# Patient Record
Sex: FEMALE | Race: OTHER | HISPANIC OR LATINO | URBAN - METROPOLITAN AREA
[De-identification: names, ages, dates, MRNs, and addresses within clinical notes are randomized per-mention and may not be internally consistent; named-entity substitution may affect disease eponyms.]

---

## 2019-02-25 ENCOUNTER — EMERGENCY (EMERGENCY)
Facility: HOSPITAL | Age: 54
LOS: 1 days | Discharge: HOME | End: 2019-02-25
Attending: EMERGENCY MEDICINE | Admitting: EMERGENCY MEDICINE
Payer: COMMERCIAL

## 2019-02-25 VITALS
SYSTOLIC BLOOD PRESSURE: 150 MMHG | HEART RATE: 108 BPM | HEIGHT: 62 IN | TEMPERATURE: 99 F | WEIGHT: 160.06 LBS | OXYGEN SATURATION: 99 % | RESPIRATION RATE: 16 BRPM | DIASTOLIC BLOOD PRESSURE: 95 MMHG

## 2019-02-25 VITALS
SYSTOLIC BLOOD PRESSURE: 156 MMHG | RESPIRATION RATE: 16 BRPM | OXYGEN SATURATION: 98 % | TEMPERATURE: 99 F | HEART RATE: 79 BPM | DIASTOLIC BLOOD PRESSURE: 93 MMHG

## 2019-02-25 PROCEDURE — 99283 EMERGENCY DEPT VISIT LOW MDM: CPT | Mod: 25

## 2019-02-25 PROCEDURE — 99283 EMERGENCY DEPT VISIT LOW MDM: CPT

## 2019-02-25 PROCEDURE — 96372 THER/PROPH/DIAG INJ SC/IM: CPT

## 2019-02-25 RX ORDER — DEXAMETHASONE 0.5 MG/5ML
6 ELIXIR ORAL ONCE
Refills: 0 | Status: COMPLETED | OUTPATIENT
Start: 2019-02-25 | End: 2019-02-25

## 2019-02-25 RX ORDER — OXYCODONE AND ACETAMINOPHEN 5; 325 MG/1; MG/1
1 TABLET ORAL ONCE
Refills: 0 | Status: DISCONTINUED | OUTPATIENT
Start: 2019-02-25 | End: 2019-02-25

## 2019-02-25 RX ADMIN — OXYCODONE AND ACETAMINOPHEN 1 TABLET(S): 5; 325 TABLET ORAL at 16:13

## 2019-02-25 RX ADMIN — Medication 6 MILLIGRAM(S): at 16:13

## 2019-02-25 NOTE — ED ADULT NURSE NOTE - NSIMPLEMENTINTERV_GEN_ALL_ED
Implemented All Universal Safety Interventions:  [place] to call system. Call bell, personal items and telephone within reach. Instruct patient to call for assistance. Room bathroom lighting operational. Non-slip footwear when patient is off stretcher. Physically safe environment: no spills, clutter or unnecessary equipment. Stretcher in lowest position, wheels locked, appropriate side rails in place.

## 2019-02-25 NOTE — ED ADULT NURSE NOTE - OBJECTIVE STATEMENT
47 yr. old female c/o right arm pain.  Pt. seen in Urgent care earlier today given medication without relief.  Pain described 9/10.  Mobile, positive sensation in right arm.

## 2019-07-02 PROBLEM — Z00.00 ENCOUNTER FOR PREVENTIVE HEALTH EXAMINATION: Status: ACTIVE | Noted: 2019-07-02

## 2019-07-16 ENCOUNTER — RECORD ABSTRACTING (OUTPATIENT)
Age: 54
End: 2019-07-16

## 2019-07-16 ENCOUNTER — APPOINTMENT (OUTPATIENT)
Dept: ENDOCRINOLOGY | Facility: CLINIC | Age: 54
End: 2019-07-16
Payer: COMMERCIAL

## 2019-07-16 ENCOUNTER — APPOINTMENT (OUTPATIENT)
Dept: ENDOCRINOLOGY | Facility: CLINIC | Age: 54
End: 2019-07-16

## 2019-07-16 VITALS
OXYGEN SATURATION: 98 % | HEIGHT: 62 IN | HEART RATE: 74 BPM | SYSTOLIC BLOOD PRESSURE: 130 MMHG | RESPIRATION RATE: 18 BRPM | BODY MASS INDEX: 33.13 KG/M2 | WEIGHT: 180 LBS | DIASTOLIC BLOOD PRESSURE: 76 MMHG

## 2019-07-16 DIAGNOSIS — Z86.79 PERSONAL HISTORY OF OTHER DISEASES OF THE CIRCULATORY SYSTEM: ICD-10-CM

## 2019-07-16 DIAGNOSIS — Z79.4 OTHER SPECIFIED DIABETES MELLITUS WITH HYPEROSMOLARITY WITH COMA: ICD-10-CM

## 2019-07-16 DIAGNOSIS — Z79.4 TYPE 2 DIABETES MELLITUS W/OUT COMPLICATIONS: ICD-10-CM

## 2019-07-16 DIAGNOSIS — E13.01 OTHER SPECIFIED DIABETES MELLITUS WITH HYPEROSMOLARITY WITH COMA: ICD-10-CM

## 2019-07-16 DIAGNOSIS — Z83.3 FAMILY HISTORY OF DIABETES MELLITUS: ICD-10-CM

## 2019-07-16 DIAGNOSIS — E11.9 TYPE 2 DIABETES MELLITUS W/OUT COMPLICATIONS: ICD-10-CM

## 2019-07-16 PROBLEM — Z00.00 ENCOUNTER FOR PREVENTIVE HEALTH EXAMINATION: Noted: 2019-07-16

## 2019-07-16 PROCEDURE — 99214 OFFICE O/P EST MOD 30 MIN: CPT

## 2019-07-16 NOTE — PHYSICAL EXAM
[Alert] : alert [No Acute Distress] : no acute distress [Well Nourished] : well nourished [Well Developed] : well developed [Normal Sclera/Conjunctiva] : normal sclera/conjunctiva [EOMI] : extra ocular movement intact [No Proptosis] : no proptosis [Normal Oropharynx] : the oropharynx was normal [Thyroid Not Enlarged] : the thyroid was not enlarged [No Thyroid Nodules] : there were no palpable thyroid nodules [No Respiratory Distress] : no respiratory distress [No Accessory Muscle Use] : no accessory muscle use [Clear to Auscultation] : lungs were clear to auscultation bilaterally [Normal Rate] : heart rate was normal  [Normal S1, S2] : normal S1 and S2 [Regular Rhythm] : with a regular rhythm [Pedal Pulses Normal] : the pedal pulses are present [No Edema] : there was no peripheral edema [Normal Bowel Sounds] : normal bowel sounds [Not Tender] : non-tender [Soft] : abdomen soft [Not Distended] : not distended [Post Cervical Nodes] : posterior cervical nodes [Anterior Cervical Nodes] : anterior cervical nodes [Axillary Nodes] : axillary nodes [Normal] : normal and non tender [No Spinal Tenderness] : no spinal tenderness [Spine Straight] : spine straight [Normal Gait] : normal gait [Normal Strength/Tone] : muscle strength and tone were normal [No Rash] : no rash [Normal Reflexes] : deep tendon reflexes were 2+ and symmetric [No Tremors] : no tremors [Oriented x3] : oriented to person, place, and time [Acanthosis Nigricans] : no acanthosis nigricans [de-identified] : dm2

## 2019-07-16 NOTE — ASSESSMENT
[FreeTextEntry1] : Patient will have lab test done evaluate her glucose level. will return next month

## 2019-07-22 ENCOUNTER — RX RENEWAL (OUTPATIENT)
Age: 54
End: 2019-07-22

## 2019-08-13 ENCOUNTER — APPOINTMENT (OUTPATIENT)
Dept: ENDOCRINOLOGY | Facility: CLINIC | Age: 54
End: 2019-08-13

## 2020-07-20 ENCOUNTER — APPOINTMENT (OUTPATIENT)
Dept: ENDOCRINOLOGY | Facility: CLINIC | Age: 55
End: 2020-07-20
Payer: COMMERCIAL

## 2020-07-20 VITALS — BODY MASS INDEX: 30.73 KG/M2 | WEIGHT: 167 LBS | HEIGHT: 62 IN

## 2020-07-20 PROCEDURE — 99448 NTRPROF PH1/NTRNET/EHR 21-30: CPT

## 2020-07-20 RX ORDER — AMLODIPINE BESYLATE 10 MG/1
10 TABLET ORAL
Refills: 0 | Status: COMPLETED | COMMUNITY
End: 2020-07-20

## 2020-07-20 RX ORDER — HALOBETASOL PROPIONATE 0.5 MG/G
0.05 CREAM TOPICAL
Qty: 15 | Refills: 0 | Status: ACTIVE | COMMUNITY
Start: 2020-07-17

## 2020-07-20 RX ORDER — LOSARTAN POTASSIUM 100 MG/1
TABLET, FILM COATED ORAL
Refills: 0 | Status: COMPLETED | COMMUNITY
End: 2020-07-20

## 2020-07-20 NOTE — ASSESSMENT
[Diabetes Foot Care] : diabetes foot care [Carbohydrate Consistent Diet] : carbohydrate consistent diet [Importance of Diet and Exercise] : importance of diet and exercise to improve glycemic control, achieve weight loss and improve cardiovascular health [Long Term Vascular Complications] : long term vascular complications of diabetes [Exercise/Effect on Glucose] : exercise/effect on glucose [Hypoglycemia Management] : hypoglycemia management [Self Monitoring of Blood Glucose] : self monitoring of blood glucose [Action and use of Insulin] : action and use of short and long-acting insulin [Insulin Self-Administration] : insulin self-administration [Sick-Day Management] : sick-day management [Injection Technique, Storage, Sharps Disposal] : injection technique, storage, and sharps disposal [Retinopathy Screening] : Patient was referred to ophthalmology for retinopathy screening [Diabetic Medications] : Risks and benefits of diabetic medications were discussed [Weight Loss] : weight loss [FreeTextEntry1] : continue diet and exercise and do labs

## 2020-07-20 NOTE — HISTORY OF PRESENT ILLNESS
[Home] : at home, [unfilled] , at the time of the visit. [Medical Office: ([facility])___] : at the medical office located in  [Verbal consent obtained from patient] : the patient, [unfilled] [Finger Stick] : Finger Stick: Yes [FreeTextEntry4] : Joleen John [Hypoglycemia] : Patient is not hypoglycemic. [Continuous Glucose Monitoring] : Continuous Glucose Monitoring: No [FreeTextEntry1] : testing 1-3 x a day

## 2020-07-20 NOTE — THERAPY
[Today's Date] : [unfilled] [BG Target = ____] : BG Target = [unfilled] [Basaglar] : Basaglar [FreeTextEntry9] : 20 units daily [FreeTextEntry7] : patient has not taken her trulicity or xigduo. will do labs and evaluate

## 2020-07-21 RX ORDER — INSULIN GLARGINE 100 [IU]/ML
100 INJECTION, SOLUTION SUBCUTANEOUS DAILY
Qty: 5 | Refills: 5 | Status: COMPLETED | COMMUNITY
Start: 2020-07-14 | End: 2020-07-21

## 2020-08-17 ENCOUNTER — APPOINTMENT (OUTPATIENT)
Dept: ENDOCRINOLOGY | Facility: CLINIC | Age: 55
End: 2020-08-17
Payer: COMMERCIAL

## 2020-08-17 PROCEDURE — 99448 NTRPROF PH1/NTRNET/EHR 21-30: CPT

## 2020-08-17 RX ORDER — DAPAGLIFLOZIN AND METFORMIN HYDROCHLORIDE 5; 1000 MG/1; MG/1
5-1000 TABLET, FILM COATED, EXTENDED RELEASE ORAL
Refills: 0 | Status: COMPLETED | COMMUNITY
End: 2020-08-17

## 2020-08-17 NOTE — ASSESSMENT
[Long Term Vascular Complications] : long term vascular complications of diabetes [FreeTextEntry1] : discussed to stop eating ices and watermelon. increase the insulin and metformin 1000mg bid, trulicity 0.75 mg sc weekly, omega e 1000mg bid, an atorvastatin 40 mg q hs. need to evaluate 2 weeks, bring her glucose monitor log [Carbohydrate Consistent Diet] : carbohydrate consistent diet [Importance of Diet and Exercise] : importance of diet and exercise to improve glycemic control, achieve weight loss and improve cardiovascular health [Exercise/Effect on Glucose] : exercise/effect on glucose [Self Monitoring of Blood Glucose] : self monitoring of blood glucose [Insulin Self-Administration] : insulin self-administration [Action and use of Insulin] : action and use of short and long-acting insulin [Injection Technique, Storage, Sharps Disposal] : injection technique, storage, and sharps disposal [Weight Loss] : weight loss [Diabetic Medications] : Risks and benefits of diabetic medications were discussed

## 2020-08-17 NOTE — THERAPY
[Today's Date] : [unfilled] [Lantus] : Lantus [FreeTextEntry9] : increase to 30 units, taking 25 units glucose this morning 302

## 2020-08-17 NOTE — HISTORY OF PRESENT ILLNESS
[Home] : at home, [unfilled] , at the time of the visit. [Verbal consent obtained from patient] : the patient, [unfilled] [Medical Office: ([facility])___] : at the medical office located in  [Continuous Glucose Monitoring] : Continuous Glucose Monitoring: No [FreeTextEntry4] : Chanda Avina [Finger Stick] : Finger Stick: Yes [FreeTextEntry9] : 302 [Hypoglycemia] : Patient is not hypoglycemic. [FreeTextEntry1] : 1-3 x a day

## 2020-08-20 ENCOUNTER — APPOINTMENT (OUTPATIENT)
Dept: ENDOCRINOLOGY | Facility: CLINIC | Age: 55
End: 2020-08-20

## 2020-09-29 ENCOUNTER — APPOINTMENT (OUTPATIENT)
Dept: ENDOCRINOLOGY | Facility: CLINIC | Age: 55
End: 2020-09-29

## 2021-01-14 ENCOUNTER — APPOINTMENT (OUTPATIENT)
Dept: ENDOCRINOLOGY | Facility: CLINIC | Age: 56
End: 2021-01-14
Payer: COMMERCIAL

## 2021-01-14 VITALS — WEIGHT: 150 LBS | BODY MASS INDEX: 27.6 KG/M2 | HEIGHT: 62 IN

## 2021-01-14 DIAGNOSIS — Z78.9 OTHER SPECIFIED HEALTH STATUS: ICD-10-CM

## 2021-01-14 PROCEDURE — 99214 OFFICE O/P EST MOD 30 MIN: CPT | Mod: 95

## 2021-01-15 NOTE — REVIEW OF SYSTEMS
[Recent Weight Loss (___ Lbs)] : recent weight loss: [unfilled] lbs [Nausea] : nausea [Gas/Bloating] : gas/bloating [Negative] : Heme/Lymph [FreeTextEntry2] : 3 [FreeTextEntry7] : occasionally before dinner [de-identified] : DM2

## 2021-01-15 NOTE — HISTORY OF PRESENT ILLNESS
[Home] : at home, [unfilled] , at the time of the visit. [Medical Office: ([facility])___] : at the medical office located in  [Verbal consent obtained from patient] : the patient, [unfilled] [Finger Stick] : Finger Stick: Yes [FreeTextEntry4] : Joleen John [Continuous Glucose Monitoring] : Continuous Glucose Monitoring: No [Hypoglycemia] : Patient is not hypoglycemic. [FreeTextEntry9] : 105-109 [FreeTextEntry1] : testing 1-3 a day

## 2021-01-15 NOTE — THERAPY
[Today's Date] : [unfilled] [Lantus] : Lantus [BG Target = ____] : BG Target = [unfilled] [FreeTextEntry9] : 33 units at night [FreeTextEntry7] : trulicity 0.75 mg weekly, metformin 1000 mg bid

## 2021-01-15 NOTE — DATA REVIEWED
[No studies available for review at this time.] : No studies available for review at this time. [FreeTextEntry1] : 1

## 2021-01-15 NOTE — ASSESSMENT
[Diabetes Foot Care] : diabetes foot care [Long Term Vascular Complications] : long term vascular complications of diabetes [Carbohydrate Consistent Diet] : carbohydrate consistent diet [Importance of Diet and Exercise] : importance of diet and exercise to improve glycemic control, achieve weight loss and improve cardiovascular health [Exercise/Effect on Glucose] : exercise/effect on glucose [Hypoglycemia Management] : hypoglycemia management [Glucagon Use] : glucagon use [Ketone Testing] : ketone testing [Action and use of Insulin] : action and use of short and long-acting insulin [Self Monitoring of Blood Glucose] : self monitoring of blood glucose [Insulin Self-Administration] : insulin self-administration [Injection Technique, Storage, Sharps Disposal] : injection technique, storage, and sharps disposal [Sick-Day Management] : sick-day management [Retinopathy Screening] : Patient was referred to ophthalmology for retinopathy screening [Weight Loss] : weight loss [Diabetic Medications] : Risks and benefits of diabetic medications were discussed

## 2021-02-10 ENCOUNTER — INPATIENT (INPATIENT)
Facility: HOSPITAL | Age: 56
LOS: 11 days | Discharge: HOME HEALTH | End: 2021-02-22
Attending: SPECIALIST | Admitting: SPECIALIST
Payer: COMMERCIAL

## 2021-02-10 ENCOUNTER — RESULT REVIEW (OUTPATIENT)
Age: 56
End: 2021-02-10

## 2021-02-10 VITALS
DIASTOLIC BLOOD PRESSURE: 93 MMHG | TEMPERATURE: 98 F | HEIGHT: 62 IN | SYSTOLIC BLOOD PRESSURE: 128 MMHG | WEIGHT: 149.03 LBS | RESPIRATION RATE: 18 BRPM | HEART RATE: 104 BPM | OXYGEN SATURATION: 97 %

## 2021-02-10 DIAGNOSIS — Z98.51 TUBAL LIGATION STATUS: Chronic | ICD-10-CM

## 2021-02-10 LAB
ALBUMIN SERPL ELPH-MCNC: 3.8 G/DL — SIGNIFICANT CHANGE UP (ref 3.5–5.2)
ALP SERPL-CCNC: 66 U/L — SIGNIFICANT CHANGE UP (ref 30–115)
ALT FLD-CCNC: 20 U/L — SIGNIFICANT CHANGE UP (ref 0–41)
ANION GAP SERPL CALC-SCNC: 29 MMOL/L — HIGH (ref 7–14)
APTT BLD: 27.1 SEC — SIGNIFICANT CHANGE UP (ref 27–39.2)
AST SERPL-CCNC: 21 U/L — SIGNIFICANT CHANGE UP (ref 0–41)
B-OH-BUTYR SERPL-SCNC: 0.7 MMOL/L — HIGH
BASE EXCESS BLDV CALC-SCNC: SIGNIFICANT CHANGE UP MMOL/L (ref -2–2)
BASOPHILS # BLD AUTO: 0 K/UL — SIGNIFICANT CHANGE UP (ref 0–0.2)
BASOPHILS NFR BLD AUTO: 0 % — SIGNIFICANT CHANGE UP (ref 0–1)
BILIRUB SERPL-MCNC: 1.1 MG/DL — SIGNIFICANT CHANGE UP (ref 0.2–1.2)
BLD GP AB SCN SERPL QL: SIGNIFICANT CHANGE UP
BUN SERPL-MCNC: 85 MG/DL — CRITICAL HIGH (ref 10–20)
BURR CELLS BLD QL SMEAR: PRESENT — SIGNIFICANT CHANGE UP
CA-I SERPL-SCNC: 1.02 MMOL/L — LOW (ref 1.12–1.3)
CALCIUM SERPL-MCNC: 8.8 MG/DL — SIGNIFICANT CHANGE UP (ref 8.5–10.1)
CHLORIDE SERPL-SCNC: 92 MMOL/L — LOW (ref 98–110)
CO2 SERPL-SCNC: 16 MMOL/L — LOW (ref 17–32)
CREAT SERPL-MCNC: 3.6 MG/DL — HIGH (ref 0.7–1.5)
EOSINOPHIL # BLD AUTO: 0 K/UL — SIGNIFICANT CHANGE UP (ref 0–0.7)
EOSINOPHIL NFR BLD AUTO: 0 % — SIGNIFICANT CHANGE UP (ref 0–8)
GAS PNL BLDV: 134 MMOL/L — LOW (ref 136–145)
GAS PNL BLDV: SIGNIFICANT CHANGE UP
GAS PNL BLDV: SIGNIFICANT CHANGE UP
GIANT PLATELETS BLD QL SMEAR: PRESENT — SIGNIFICANT CHANGE UP
GLUCOSE SERPL-MCNC: 193 MG/DL — HIGH (ref 70–99)
HCG SERPL QL: NEGATIVE — SIGNIFICANT CHANGE UP
HCO3 BLDV-SCNC: 22 MMOL/L — SIGNIFICANT CHANGE UP (ref 22–29)
HCT VFR BLD CALC: 45.1 % — SIGNIFICANT CHANGE UP (ref 37–47)
HCT VFR BLDA CALC: SIGNIFICANT CHANGE UP % (ref 34–44)
HGB BLD CALC-MCNC: SIGNIFICANT CHANGE UP G/DL (ref 14–18)
HGB BLD-MCNC: 16.3 G/DL — HIGH (ref 12–16)
INR BLD: 1.16 RATIO — SIGNIFICANT CHANGE UP (ref 0.65–1.3)
LACTATE BLDV-MCNC: 4.3 MMOL/L — HIGH (ref 0.5–1.6)
LACTATE SERPL-SCNC: 4.7 MMOL/L — CRITICAL HIGH (ref 0.7–2)
LIDOCAIN IGE QN: 9 U/L — SIGNIFICANT CHANGE UP (ref 7–60)
LYMPHOCYTES # BLD AUTO: 10.5 % — LOW (ref 20.5–51.1)
LYMPHOCYTES # BLD AUTO: 2.08 K/UL — SIGNIFICANT CHANGE UP (ref 1.2–3.4)
MANUAL SMEAR VERIFICATION: SIGNIFICANT CHANGE UP
MCHC RBC-ENTMCNC: 30.3 PG — SIGNIFICANT CHANGE UP (ref 27–31)
MCHC RBC-ENTMCNC: 36.1 G/DL — SIGNIFICANT CHANGE UP (ref 32–37)
MCV RBC AUTO: 83.8 FL — SIGNIFICANT CHANGE UP (ref 81–99)
MONOCYTES # BLD AUTO: 1.9 K/UL — HIGH (ref 0.1–0.6)
MONOCYTES NFR BLD AUTO: 9.6 % — HIGH (ref 1.7–9.3)
NEUTROPHILS # BLD AUTO: 15.45 K/UL — HIGH (ref 1.4–6.5)
NEUTROPHILS NFR BLD AUTO: 70.2 % — SIGNIFICANT CHANGE UP (ref 42.2–75.2)
NEUTS BAND # BLD: 7.9 % — HIGH (ref 0–6)
PCO2 BLDV: 42 MMHG — SIGNIFICANT CHANGE UP (ref 41–51)
PH BLDV: 7.31 — SIGNIFICANT CHANGE UP (ref 7.26–7.43)
PLAT MORPH BLD: NORMAL — SIGNIFICANT CHANGE UP
PLATELET # BLD AUTO: 308 K/UL — SIGNIFICANT CHANGE UP (ref 130–400)
PO2 BLDV: 38 MMHG — SIGNIFICANT CHANGE UP (ref 20–40)
POIKILOCYTOSIS BLD QL AUTO: SIGNIFICANT CHANGE UP
POTASSIUM BLDV-SCNC: 3.8 MMOL/L — SIGNIFICANT CHANGE UP (ref 3.3–5.6)
POTASSIUM SERPL-MCNC: 4 MMOL/L — SIGNIFICANT CHANGE UP (ref 3.5–5)
POTASSIUM SERPL-SCNC: 4 MMOL/L — SIGNIFICANT CHANGE UP (ref 3.5–5)
PROT SERPL-MCNC: 6.5 G/DL — SIGNIFICANT CHANGE UP (ref 6–8)
PROTHROM AB SERPL-ACNC: 13.3 SEC — HIGH (ref 9.95–12.87)
RBC # BLD: 5.38 M/UL — SIGNIFICANT CHANGE UP (ref 4.2–5.4)
RBC # FLD: 13.4 % — SIGNIFICANT CHANGE UP (ref 11.5–14.5)
RBC BLD AUTO: ABNORMAL
SAO2 % BLDV: SIGNIFICANT CHANGE UP %
SARS-COV-2 RNA SPEC QL NAA+PROBE: SIGNIFICANT CHANGE UP
SODIUM SERPL-SCNC: 137 MMOL/L — SIGNIFICANT CHANGE UP (ref 135–146)
VARIANT LYMPHS # BLD: 1.8 % — SIGNIFICANT CHANGE UP (ref 0–5)
WBC # BLD: 19.78 K/UL — HIGH (ref 4.8–10.8)
WBC # FLD AUTO: 19.78 K/UL — HIGH (ref 4.8–10.8)

## 2021-02-10 PROCEDURE — 93010 ELECTROCARDIOGRAM REPORT: CPT

## 2021-02-10 PROCEDURE — 99285 EMERGENCY DEPT VISIT HI MDM: CPT

## 2021-02-10 PROCEDURE — 71045 X-RAY EXAM CHEST 1 VIEW: CPT | Mod: 26

## 2021-02-10 PROCEDURE — 74177 CT ABD & PELVIS W/CONTRAST: CPT | Mod: 26

## 2021-02-10 RX ORDER — PANTOPRAZOLE SODIUM 20 MG/1
40 TABLET, DELAYED RELEASE ORAL DAILY
Refills: 0 | Status: DISCONTINUED | OUTPATIENT
Start: 2021-02-10 | End: 2021-02-11

## 2021-02-10 RX ORDER — PIPERACILLIN AND TAZOBACTAM 4; .5 G/20ML; G/20ML
3.38 INJECTION, POWDER, LYOPHILIZED, FOR SOLUTION INTRAVENOUS ONCE
Refills: 0 | Status: COMPLETED | OUTPATIENT
Start: 2021-02-10 | End: 2021-02-10

## 2021-02-10 RX ORDER — SODIUM CHLORIDE 9 MG/ML
1000 INJECTION INTRAMUSCULAR; INTRAVENOUS; SUBCUTANEOUS ONCE
Refills: 0 | Status: COMPLETED | OUTPATIENT
Start: 2021-02-10 | End: 2021-02-10

## 2021-02-10 RX ORDER — HYDROMORPHONE HYDROCHLORIDE 2 MG/ML
1 INJECTION INTRAMUSCULAR; INTRAVENOUS; SUBCUTANEOUS ONCE
Refills: 0 | Status: DISCONTINUED | OUTPATIENT
Start: 2021-02-10 | End: 2021-02-10

## 2021-02-10 RX ORDER — CEFOTETAN DISODIUM 1 G
2 VIAL (EA) INJECTION ONCE
Refills: 0 | Status: DISCONTINUED | OUTPATIENT
Start: 2021-02-10 | End: 2021-02-10

## 2021-02-10 RX ORDER — SODIUM CHLORIDE 9 MG/ML
1000 INJECTION, SOLUTION INTRAVENOUS ONCE
Refills: 0 | Status: COMPLETED | OUTPATIENT
Start: 2021-02-10 | End: 2021-02-10

## 2021-02-10 RX ORDER — HEPARIN SODIUM 5000 [USP'U]/ML
5000 INJECTION INTRAVENOUS; SUBCUTANEOUS EVERY 8 HOURS
Refills: 0 | Status: DISCONTINUED | OUTPATIENT
Start: 2021-02-10 | End: 2021-02-11

## 2021-02-10 RX ORDER — IOHEXOL 300 MG/ML
30 INJECTION, SOLUTION INTRAVENOUS ONCE
Refills: 0 | Status: COMPLETED | OUTPATIENT
Start: 2021-02-10 | End: 2021-02-10

## 2021-02-10 RX ORDER — CEFOTETAN DISODIUM 1 G
2 VIAL (EA) INJECTION EVERY 12 HOURS
Refills: 0 | Status: DISCONTINUED | OUTPATIENT
Start: 2021-02-10 | End: 2021-02-10

## 2021-02-10 RX ORDER — MORPHINE SULFATE 50 MG/1
4 CAPSULE, EXTENDED RELEASE ORAL ONCE
Refills: 0 | Status: DISCONTINUED | OUTPATIENT
Start: 2021-02-10 | End: 2021-02-10

## 2021-02-10 RX ORDER — PIPERACILLIN AND TAZOBACTAM 4; .5 G/20ML; G/20ML
3.38 INJECTION, POWDER, LYOPHILIZED, FOR SOLUTION INTRAVENOUS ONCE
Refills: 0 | Status: DISCONTINUED | OUTPATIENT
Start: 2021-02-10 | End: 2021-02-11

## 2021-02-10 RX ADMIN — SODIUM CHLORIDE 1000 MILLILITER(S): 9 INJECTION INTRAMUSCULAR; INTRAVENOUS; SUBCUTANEOUS at 19:22

## 2021-02-10 RX ADMIN — HYDROMORPHONE HYDROCHLORIDE 1 MILLIGRAM(S): 2 INJECTION INTRAMUSCULAR; INTRAVENOUS; SUBCUTANEOUS at 22:25

## 2021-02-10 RX ADMIN — SODIUM CHLORIDE 1000 MILLILITER(S): 9 INJECTION, SOLUTION INTRAVENOUS at 21:29

## 2021-02-10 RX ADMIN — IOHEXOL 30 MILLILITER(S): 300 INJECTION, SOLUTION INTRAVENOUS at 18:33

## 2021-02-10 RX ADMIN — SODIUM CHLORIDE 1000 MILLILITER(S): 9 INJECTION INTRAMUSCULAR; INTRAVENOUS; SUBCUTANEOUS at 17:35

## 2021-02-10 RX ADMIN — MORPHINE SULFATE 4 MILLIGRAM(S): 50 CAPSULE, EXTENDED RELEASE ORAL at 17:35

## 2021-02-10 RX ADMIN — PIPERACILLIN AND TAZOBACTAM 200 GRAM(S): 4; .5 INJECTION, POWDER, LYOPHILIZED, FOR SOLUTION INTRAVENOUS at 18:33

## 2021-02-10 NOTE — H&P ADULT - NSHPLABSRESULTS_GEN_ALL_CORE
LAB/STUDIES:                        16.3   19.78 )-----------( 308      ( 10 Feb 2021 17:56 )             45.1     02-10    137  |  92<L>  |  85<HH>  ----------------------------<  193<H>  4.0   |  16<L>  |  3.6<H>    Ca    8.8      10 Feb 2021 17:56    TPro  6.5  /  Alb  3.8  /  TBili  1.1  /  DBili  x   /  AST  21  /  ALT  20  /  AlkPhos  66  02-10      LIVER FUNCTIONS - ( 10 Feb 2021 17:56 )  Alb: 3.8 g/dL / Pro: 6.5 g/dL / ALK PHOS: 66 U/L / ALT: 20 U/L / AST: 21 U/L / GGT: x             < from: CT Abdomen and Pelvis w/ Oral Cont and w/ IV Cont (02.10.21 @ 21:05) >      IMPRESSION:    There is a large ventral hernia containing distal small bowel loops, the appendix, and the right colon from the cecum to the level of the transverse colon. There is marked distention of proximal small bowel consistent with small bowel obstruction at the level of the hernia. There is reduced bowel wall enhancement of the distal small bowel proximal to the hernia which may indicate the presence of ischemia.    < end of copied text >

## 2021-02-10 NOTE — ED ADULT TRIAGE NOTE - CHIEF COMPLAINT QUOTE
pt BIBA from home with complaint of abdominal pain , pt has history of hernia and was shoveling the other day when symptoms started

## 2021-02-10 NOTE — ED ADULT TRIAGE NOTE - CADM TRG TX PRIOR TO ARRIVAL
The patient was seen in the urgent care on 1/19/20 for a right middle finger non displaced fracture and to follow up in 1 week.     The finger continues to be swollen and discolored and with a 8/10 pain    She was also seen for cold symptoms and diagnosed w none

## 2021-02-10 NOTE — ED ADULT NURSE NOTE - OBJECTIVE STATEMENT
54 y/o female brought in for abdominal pain. patient states she has an abdominal hernia for years and now complaint of severe pain. patient admits to constipation x 3 days and vomiting.

## 2021-02-10 NOTE — H&P ADULT - HISTORY OF PRESENT ILLNESS
55y female with a past medical hx of HTN, Hyperlipidemia, DM, presents to the ED complaining of increased pain from a chronic umbilical hernia. She states that she first noticed this hernia about 20 years ago, immediately after a tubal ligation procedure, and it has been slowly enlarging since. She states that it always has been uncomfortable, but never this painful. The severe pain is rated 10/10 and is accompanied with nausea and vomiting, of 3 days duration. She states she is not passing gas and that her last bowel movement was 1 week ago.

## 2021-02-10 NOTE — H&P ADULT - ASSESSMENT
55y female with a past medical hx of HTN, Hyperlipidemia, DM, presents to the ED complaining of increased pain from a chronic umbilical hernia. She states that she first noticed this hernia about 20 years ago, immediately after a tubal ligation procedure, and it has been slowly enlarging since. She states that it always has been uncomfortable, but never this painful. The severe pain is rated 10/10 and is accompanied with nausea and vomiting, of 3 days duration. She states she is not passing gas and that her last bowel movement was 1 week ago.     CT Abdomen was done showing ventral hernia containing distal small bowel loops, the appendix, and the right colon from the cecum to the level of the transverse colon. There is marked distention of proximal small bowel consistent with small bowel obstruction at the level of the hernia. There is reduced bowel wall enhancement of the distal small bowel proximal to the hernia which may indicate the presence of ischemia.    Decision made to take patient emergently to the OR tonight with Dr. Bolden.  NGT was placed. Consent done.    Plan:  OR Level 1 Exploratory Laparotomy, Possible Bowel Resection, Possible Ostomy, and All indicated Procedures  Prepare patient for the operating room.  Keep strict NPO  Make sure all labs and imaging is done.  55y female with a past medical hx of HTN, Hyperlipidemia, DM, presents to the ED complaining of increased pain from a chronic umbilical hernia. She states that she first noticed this hernia about 20 years ago, immediately after a tubal ligation procedure, and it has been slowly enlarging since. She states that it always has been uncomfortable, but never this painful. The severe pain is rated 10/10 and is accompanied with nausea and vomiting, of 3 days duration. She states she is not passing gas and that her last bowel movement was 1 week ago.     CT Abdomen was done showing ventral hernia containing distal small bowel loops, the appendix, and the right colon from the cecum to the level of the transverse colon. There is marked distention of proximal small bowel consistent with small bowel obstruction at the level of the hernia. There is reduced bowel wall enhancement of the distal small bowel proximal to the hernia which may indicate the presence of ischemia.    Decision made to take patient emergently to the OR tonight with Dr. Bolden.  NGT was placed. Consent done.    Plan:  OR Level 1 Exploratory Laparotomy, Possible Bowel Resection, Possible Ostomy, and All indicated Procedures  Prepare patient for the operating room.  Keep strict NPO  Make sure all labs and imaging is done.    Senior Resident Addendum  Narrative as above. Benign physical exam, without rebound, guarding, non-peritonitic however patient reporting back pain. Vitals significant for isolated tachycardia. Labs with significant derangements, including but not limited to leukocytosis to 19.78, hemoconcentrated hgb 16.3, DERRICK to 3.6, lactate 4 refractory to fluid resuscitation in ED. Central line, a-line placed prior to beginning of case. Booked as emergent d/t concerning signs of bowel ischemia. Extensive discussion had with patient in regards to possible need for bowel resection, prolonged hospital stay, possible prolonged ventilation requirement, returns to OR entirely contingent upon OR findings. Patient at high risk for worsening sepsis upon surgical intervention, patient aware of risks and agreeable for OR.   Case d/w Dr. Bolden, ED, patient, surgical team.

## 2021-02-10 NOTE — H&P ADULT - NSICDXPASTMEDICALHX_GEN_ALL_CORE_FT
PAST MEDICAL HISTORY:  No pertinent past medical history      PAST MEDICAL HISTORY:  Diabetes     Hypertension     Marijuana use, continuous     No pertinent past medical history

## 2021-02-10 NOTE — ED PROVIDER NOTE - PROGRESS NOTE DETAILS
surgery aware of pt, was at bedside shortly after initial eval. aware of labs, d/w pt. awaiting CT rad called with CT read, surg and pt notified. preop testing ordered. Attending Note: 56 y/o F PMHx DM, HTN, hernia. Pt states she was shoveling snow x3 days ago and reports pain in her hernia site. Pain is 11/10, sharp. States she had x1 episode of vomiting. No fever, chills, cough, SOB, or urinary complaints. Exam: CONSTITUTIONAL: WA / WN / NAD. HEAD: NCAT. EYES: PERRL; EOMI; anicteric. ENT: Normal pharynx; mucous membranes pink/moist, no erythema. NECK: Supple; no meningeal signs CARD: RRR; nl S1/S2; no M/R/G. Pulses equal bilaterally. RESP: Respiratory rate and effort are normal; breath sounds clear and equal bilaterally. ABD: large hernia felt below umbilicus with ttp, soft, ND nl bowel sounds; no rebound. MSK/EXT: No gross deformities; full range of motion. SKIN: Warm and dry;  NEURO: AAOx3, PSYCH: Memory Intact, Normal Affect. Attending Note: 56 y/o F PMHx DM, HTN, hernia. Pt states she was shoveling snow x3 days ago and reports pain in her hernia site. Pain is 11/10, sharp. States she had x1 episode of vomiting. No fever, chills, cough, SOB, or urinary complaints. Exam: CONSTITUTIONAL: WA / WN / NAD. HEAD: NCAT. EYES: PERRL; EOMI; ENT: Normal pharynx; mucous membranes pink/moist, no erythema. NECK: Supple; no meningeal signs CARD: RRR; nl S1/S2; no M/R/G. Pulses equal bilaterally. RESP: Respiratory rate and effort are normal; breath sounds clear and equal bilaterally. ABD: large hernia felt below umbilicus with ttp, MSK/EXT: No gross deformities; full range of motion. SKIN: Warm and dry;  NEURO: AAOx3, PSYCH: Memory Intact, Normal Affect. SR: spoke with Dr. Gonzalez patient to be admitted to Dr. Radford to go to the OR

## 2021-02-10 NOTE — ED PROVIDER NOTE - CLINICAL SUMMARY MEDICAL DECISION MAKING FREE TEXT BOX
56 y/o F PMHx DM, HTN, hernia. Pt states she was shoveling snow x3 days ago and reports pain in her hernia site. Pain is 11/10, sharp. VS reviewed. Labs imaging obtained and reviewed. Antibiotics ordered. Patient found to have There is a large ventral hernia containing distal small bowel loops, the appendix, and the right colon from the cecum to the level of the transverse colon. There is marked distention of proximal small bowel consistent with small bowel obstruction at the level of the hernia. There is reduced bowel wall enhancement of the distal small bowel proximal to the hernia which may indicate the presence of ischemia. Pain medicine Given. Tavarez was placed due to DERRICK. Surgery consulted and patient to be admitted under dr. manzano to go to the OR.

## 2021-02-10 NOTE — ED PROVIDER NOTE - PHYSICAL EXAMINATION
CONSTITUTIONAL: Well-appearing; well-nourished; in no apparent distress.   CARDIOVASCULAR: Normal S1, S2; no murmurs, rubs, or gallops.   RESPIRATORY: Normal chest excursion with respiration; breath sounds clear and equal bilaterally; no wheezes, rhonchi, or rales.  GI/: large tender mass to abdomen susp for hernia, nonreducible, with BS  SKIN: Normal for age and race; warm; dry; good turgor; no apparent lesions or exudate.   NEURO/PSYCH: A & O x 4; grossly unremarkable. mood and manner are appropriate. Grooming and personal hygiene are appropriate. No apparent thoughts of harm to self or others.

## 2021-02-10 NOTE — ED ADULT NURSE NOTE - NSIMPLEMENTINTERV_GEN_ALL_ED
Implemented All Fall with Harm Risk Interventions:  [place] to call system. Call bell, personal items and telephone within reach. Instruct patient to call for assistance. Room bathroom lighting operational. Non-slip footwear when patient is off stretcher. Physically safe environment: no spills, clutter or unnecessary equipment. Stretcher in lowest position, wheels locked, appropriate side rails in place. Provide visual cue, wrist band, yellow gown, etc. Monitor gait and stability. Monitor for mental status changes and reorient to person, place, and time. Review medications for side effects contributing to fall risk. Reinforce activity limits and safety measures with patient and family. Provide visual clues: red socks.

## 2021-02-10 NOTE — ED PROVIDER NOTE - OBJECTIVE STATEMENT
pt presents to ED c/o abd pain since shoveling snow a few days ago. pt with h/o hernia for over 20 yrs per pt, but has not seen anyone for it. pain is sharp, nonradiating, moderate. denies exacerbating or relieving factors. +vomiting once today. Denies fever/chill/HA/dizziness/chest pain/palpitation/sob/d/ black stool/bloody stool/urinary sxs

## 2021-02-10 NOTE — ED PROVIDER NOTE - NS ED ROS FT
Constitutional: no fever, chills, no recent weight loss, change in appetite or malaise  Eyes: no redness/discharge/pain/vision changes  ENT: no rhinorrhea/ear pain/sore throat  Cardiac: No chest pain, SOB or edema.  Respiratory: No cough or respiratory distress  GI: No diarrhea   : No dysuria, frequency, urgency or hematuria  MS: no pain to back or extremities, no loss of ROM, no weakness  Neuro: No headache or weakness. No LOC.  Skin: No skin rash.  Endocrine: No history of thyroid disease or diabetes.  Except as documented in the HPI, all other systems are negative.

## 2021-02-10 NOTE — ED PROVIDER NOTE - PMH
No pertinent past medical history     Diabetes    Hypertension    Marijuana use, continuous    No pertinent past medical history

## 2021-02-10 NOTE — H&P ADULT - NSHPPHYSICALEXAM_GEN_ALL_CORE
Vital Signs Last 24 Hrs  T(C): 36.7 (10 Feb 2021 15:50), Max: 36.7 (10 Feb 2021 15:50)  T(F): 98 (10 Feb 2021 15:50), Max: 98 (10 Feb 2021 15:50)  HR: 104 (10 Feb 2021 15:50) (104 - 104)  BP: 128/93 (10 Feb 2021 15:50) (128/93 - 128/93)  BP(mean): --  RR: 18 (10 Feb 2021 15:50) (18 - 18)  SpO2: 97% (10 Feb 2021 15:50) (97% - 97%)    PHYSICAL EXAM:  GENERAL: in mild distress, alert and oriented to person/place/time  CHEST/LUNG: Clear to auscultation bilaterally  HEART: Regular rate and rhythm by radial pulse  ABDOMEN: Soft, +large umbilical hernia appearing to contain small bowel, Mildly tender with no overlying erythema and is non-reducible.  EXTREMITIES:  No clubbing, cyanosis, or edema. Extremities are cool to touch

## 2021-02-11 LAB
ALBUMIN SERPL ELPH-MCNC: 2.8 G/DL — LOW (ref 3.5–5.2)
ALP SERPL-CCNC: 45 U/L — SIGNIFICANT CHANGE UP (ref 30–115)
ALT FLD-CCNC: 15 U/L — SIGNIFICANT CHANGE UP (ref 0–41)
ANION GAP SERPL CALC-SCNC: 12 MMOL/L — SIGNIFICANT CHANGE UP (ref 7–14)
ANION GAP SERPL CALC-SCNC: 17 MMOL/L — HIGH (ref 7–14)
ANION GAP SERPL CALC-SCNC: 18 MMOL/L — HIGH (ref 7–14)
APTT BLD: 26.2 SEC — LOW (ref 27–39.2)
AST SERPL-CCNC: 17 U/L — SIGNIFICANT CHANGE UP (ref 0–41)
BASE EXCESS BLDA CALC-SCNC: -5.7 MMOL/L — LOW (ref -2–2)
BASE EXCESS BLDA CALC-SCNC: -6.2 MMOL/L — LOW (ref -2–2)
BASE EXCESS BLDA CALC-SCNC: -6.4 MMOL/L — LOW (ref -2–2)
BASE EXCESS BLDA CALC-SCNC: -6.8 MMOL/L — LOW (ref -2–2)
BASOPHILS # BLD AUTO: 0.01 K/UL — SIGNIFICANT CHANGE UP (ref 0–0.2)
BASOPHILS NFR BLD AUTO: 0.1 % — SIGNIFICANT CHANGE UP (ref 0–1)
BILIRUB SERPL-MCNC: 1 MG/DL — SIGNIFICANT CHANGE UP (ref 0.2–1.2)
BUN SERPL-MCNC: 60 MG/DL — HIGH (ref 10–20)
BUN SERPL-MCNC: 77 MG/DL — CRITICAL HIGH (ref 10–20)
BUN SERPL-MCNC: 78 MG/DL — CRITICAL HIGH (ref 10–20)
CALCIUM SERPL-MCNC: 6.7 MG/DL — LOW (ref 8.5–10.1)
CALCIUM SERPL-MCNC: 6.8 MG/DL — LOW (ref 8.5–10.1)
CALCIUM SERPL-MCNC: 7.3 MG/DL — LOW (ref 8.5–10.1)
CHLORIDE SERPL-SCNC: 102 MMOL/L — SIGNIFICANT CHANGE UP (ref 98–110)
CHLORIDE SERPL-SCNC: 104 MMOL/L — SIGNIFICANT CHANGE UP (ref 98–110)
CHLORIDE SERPL-SCNC: 106 MMOL/L — SIGNIFICANT CHANGE UP (ref 98–110)
CO2 SERPL-SCNC: 17 MMOL/L — SIGNIFICANT CHANGE UP (ref 17–32)
CO2 SERPL-SCNC: 18 MMOL/L — SIGNIFICANT CHANGE UP (ref 17–32)
CO2 SERPL-SCNC: 18 MMOL/L — SIGNIFICANT CHANGE UP (ref 17–32)
CREAT SERPL-MCNC: 2.7 MG/DL — HIGH (ref 0.7–1.5)
CREAT SERPL-MCNC: 2.9 MG/DL — HIGH (ref 0.7–1.5)
CREAT SERPL-MCNC: 2.9 MG/DL — HIGH (ref 0.7–1.5)
EOSINOPHIL # BLD AUTO: 0.04 K/UL — SIGNIFICANT CHANGE UP (ref 0–0.7)
EOSINOPHIL NFR BLD AUTO: 0.5 % — SIGNIFICANT CHANGE UP (ref 0–8)
GAS PNL BLDA: SIGNIFICANT CHANGE UP
GAS PNL BLDA: SIGNIFICANT CHANGE UP
GLUCOSE BLDC GLUCOMTR-MCNC: 117 MG/DL — HIGH (ref 70–99)
GLUCOSE BLDC GLUCOMTR-MCNC: 128 MG/DL — HIGH (ref 70–99)
GLUCOSE BLDC GLUCOMTR-MCNC: 135 MG/DL — HIGH (ref 70–99)
GLUCOSE BLDC GLUCOMTR-MCNC: 136 MG/DL — HIGH (ref 70–99)
GLUCOSE SERPL-MCNC: 113 MG/DL — HIGH (ref 70–99)
GLUCOSE SERPL-MCNC: 124 MG/DL — HIGH (ref 70–99)
GLUCOSE SERPL-MCNC: 138 MG/DL — HIGH (ref 70–99)
HCO3 BLDA-SCNC: 17 MMOL/L — LOW (ref 23–27)
HCO3 BLDA-SCNC: 17 MMOL/L — LOW (ref 23–27)
HCO3 BLDA-SCNC: 18 MMOL/L — LOW (ref 23–27)
HCO3 BLDA-SCNC: 19 MMOL/L — LOW (ref 23–27)
HCT VFR BLD CALC: 32.6 % — LOW (ref 37–47)
HCT VFR BLD CALC: 33.4 % — LOW (ref 37–47)
HCV AB S/CO SERPL IA: 0.03 COI — SIGNIFICANT CHANGE UP
HCV AB SERPL-IMP: SIGNIFICANT CHANGE UP
HGB BLD-MCNC: 11.6 G/DL — LOW (ref 12–16)
HGB BLD-MCNC: 12 G/DL — SIGNIFICANT CHANGE UP (ref 12–16)
HOROWITZ INDEX BLDA+IHG-RTO: 21 — SIGNIFICANT CHANGE UP
IMM GRANULOCYTES NFR BLD AUTO: 0.7 % — HIGH (ref 0.1–0.3)
INR BLD: 1.2 RATIO — SIGNIFICANT CHANGE UP (ref 0.65–1.3)
LYMPHOCYTES # BLD AUTO: 0.87 K/UL — LOW (ref 1.2–3.4)
LYMPHOCYTES # BLD AUTO: 11.5 % — LOW (ref 20.5–51.1)
MAGNESIUM SERPL-MCNC: 1.1 MG/DL — LOW (ref 1.8–2.4)
MAGNESIUM SERPL-MCNC: 1.2 MG/DL — LOW (ref 1.8–2.4)
MAGNESIUM SERPL-MCNC: 2.4 MG/DL — SIGNIFICANT CHANGE UP (ref 1.8–2.4)
MCHC RBC-ENTMCNC: 30.3 PG — SIGNIFICANT CHANGE UP (ref 27–31)
MCHC RBC-ENTMCNC: 30.5 PG — SIGNIFICANT CHANGE UP (ref 27–31)
MCHC RBC-ENTMCNC: 35.6 G/DL — SIGNIFICANT CHANGE UP (ref 32–37)
MCHC RBC-ENTMCNC: 35.9 G/DL — SIGNIFICANT CHANGE UP (ref 32–37)
MCV RBC AUTO: 84.8 FL — SIGNIFICANT CHANGE UP (ref 81–99)
MCV RBC AUTO: 85.1 FL — SIGNIFICANT CHANGE UP (ref 81–99)
MONOCYTES # BLD AUTO: 0.51 K/UL — SIGNIFICANT CHANGE UP (ref 0.1–0.6)
MONOCYTES NFR BLD AUTO: 6.8 % — SIGNIFICANT CHANGE UP (ref 1.7–9.3)
NEUTROPHILS # BLD AUTO: 6.07 K/UL — SIGNIFICANT CHANGE UP (ref 1.4–6.5)
NEUTROPHILS NFR BLD AUTO: 80.4 % — HIGH (ref 42.2–75.2)
NRBC # BLD: 0 /100 WBCS — SIGNIFICANT CHANGE UP (ref 0–0)
NRBC # BLD: 0 /100 WBCS — SIGNIFICANT CHANGE UP (ref 0–0)
PCO2 BLDA: 25 MMHG — LOW (ref 38–42)
PCO2 BLDA: 26 MMHG — LOW (ref 38–42)
PCO2 BLDA: 28 MMHG — LOW (ref 38–42)
PCO2 BLDA: 36 MMHG — LOW (ref 38–42)
PH BLDA: 7.33 — LOW (ref 7.38–7.42)
PH BLDA: 7.41 — SIGNIFICANT CHANGE UP (ref 7.38–7.42)
PH BLDA: 7.42 — SIGNIFICANT CHANGE UP (ref 7.38–7.42)
PH BLDA: 7.44 — HIGH (ref 7.38–7.42)
PHOSPHATE SERPL-MCNC: 5.3 MG/DL — HIGH (ref 2.1–4.9)
PHOSPHATE SERPL-MCNC: 7.1 MG/DL — HIGH (ref 2.1–4.9)
PHOSPHATE SERPL-MCNC: 7.1 MG/DL — HIGH (ref 2.1–4.9)
PLATELET # BLD AUTO: 212 K/UL — SIGNIFICANT CHANGE UP (ref 130–400)
PLATELET # BLD AUTO: 213 K/UL — SIGNIFICANT CHANGE UP (ref 130–400)
PO2 BLDA: 140 MMHG — HIGH (ref 78–95)
PO2 BLDA: 147 MMHG — HIGH (ref 78–95)
PO2 BLDA: 152 MMHG — HIGH (ref 78–95)
PO2 BLDA: 71 MMHG — LOW (ref 78–95)
POTASSIUM SERPL-MCNC: 3.7 MMOL/L — SIGNIFICANT CHANGE UP (ref 3.5–5)
POTASSIUM SERPL-MCNC: 3.9 MMOL/L — SIGNIFICANT CHANGE UP (ref 3.5–5)
POTASSIUM SERPL-MCNC: 4.2 MMOL/L — SIGNIFICANT CHANGE UP (ref 3.5–5)
POTASSIUM SERPL-SCNC: 3.7 MMOL/L — SIGNIFICANT CHANGE UP (ref 3.5–5)
POTASSIUM SERPL-SCNC: 3.9 MMOL/L — SIGNIFICANT CHANGE UP (ref 3.5–5)
POTASSIUM SERPL-SCNC: 4.2 MMOL/L — SIGNIFICANT CHANGE UP (ref 3.5–5)
PROT SERPL-MCNC: 4.6 G/DL — LOW (ref 6–8)
PROTHROM AB SERPL-ACNC: 13.8 SEC — HIGH (ref 9.95–12.87)
RBC # BLD: 3.83 M/UL — LOW (ref 4.2–5.4)
RBC # BLD: 3.94 M/UL — LOW (ref 4.2–5.4)
RBC # FLD: 13.2 % — SIGNIFICANT CHANGE UP (ref 11.5–14.5)
RBC # FLD: 13.4 % — SIGNIFICANT CHANGE UP (ref 11.5–14.5)
SAO2 % BLDA: 95 % — SIGNIFICANT CHANGE UP (ref 94–98)
SAO2 % BLDA: 98 % — SIGNIFICANT CHANGE UP (ref 94–98)
SAO2 % BLDA: 99 % — HIGH (ref 94–98)
SAO2 % BLDA: 99 % — HIGH (ref 94–98)
SODIUM SERPL-SCNC: 136 MMOL/L — SIGNIFICANT CHANGE UP (ref 135–146)
SODIUM SERPL-SCNC: 137 MMOL/L — SIGNIFICANT CHANGE UP (ref 135–146)
SODIUM SERPL-SCNC: 139 MMOL/L — SIGNIFICANT CHANGE UP (ref 135–146)
TROPONIN T SERPL-MCNC: 0.08 NG/ML — CRITICAL HIGH
TROPONIN T SERPL-MCNC: 0.08 NG/ML — CRITICAL HIGH
TROPONIN T SERPL-MCNC: 0.1 NG/ML — CRITICAL HIGH
WBC # BLD: 7.55 K/UL — SIGNIFICANT CHANGE UP (ref 4.8–10.8)
WBC # BLD: 7.92 K/UL — SIGNIFICANT CHANGE UP (ref 4.8–10.8)
WBC # FLD AUTO: 7.55 K/UL — SIGNIFICANT CHANGE UP (ref 4.8–10.8)
WBC # FLD AUTO: 7.92 K/UL — SIGNIFICANT CHANGE UP (ref 4.8–10.8)

## 2021-02-11 PROCEDURE — 88307 TISSUE EXAM BY PATHOLOGIST: CPT | Mod: 26

## 2021-02-11 PROCEDURE — 93306 TTE W/DOPPLER COMPLETE: CPT | Mod: 26

## 2021-02-11 PROCEDURE — 99291 CRITICAL CARE FIRST HOUR: CPT

## 2021-02-11 PROCEDURE — 93010 ELECTROCARDIOGRAM REPORT: CPT

## 2021-02-11 PROCEDURE — 71045 X-RAY EXAM CHEST 1 VIEW: CPT | Mod: 26

## 2021-02-11 PROCEDURE — 88302 TISSUE EXAM BY PATHOLOGIST: CPT | Mod: 26

## 2021-02-11 RX ORDER — DEXMEDETOMIDINE HYDROCHLORIDE IN 0.9% SODIUM CHLORIDE 4 UG/ML
0.2 INJECTION INTRAVENOUS
Qty: 400 | Refills: 0 | Status: DISCONTINUED | OUTPATIENT
Start: 2021-02-11 | End: 2021-02-11

## 2021-02-11 RX ORDER — PANTOPRAZOLE SODIUM 20 MG/1
40 TABLET, DELAYED RELEASE ORAL DAILY
Refills: 0 | Status: DISCONTINUED | OUTPATIENT
Start: 2021-02-11 | End: 2021-02-11

## 2021-02-11 RX ORDER — PANTOPRAZOLE SODIUM 20 MG/1
40 TABLET, DELAYED RELEASE ORAL DAILY
Refills: 0 | Status: DISCONTINUED | OUTPATIENT
Start: 2021-02-11 | End: 2021-02-16

## 2021-02-11 RX ORDER — CEFEPIME 1 G/1
2000 INJECTION, POWDER, FOR SOLUTION INTRAMUSCULAR; INTRAVENOUS EVERY 12 HOURS
Refills: 0 | Status: DISCONTINUED | OUTPATIENT
Start: 2021-02-11 | End: 2021-02-11

## 2021-02-11 RX ORDER — SODIUM CHLORIDE 9 MG/ML
1000 INJECTION, SOLUTION INTRAVENOUS
Refills: 0 | Status: DISCONTINUED | OUTPATIENT
Start: 2021-02-11 | End: 2021-02-11

## 2021-02-11 RX ORDER — FENTANYL CITRATE 50 UG/ML
25 INJECTION INTRAVENOUS EVERY 4 HOURS
Refills: 0 | Status: DISCONTINUED | OUTPATIENT
Start: 2021-02-11 | End: 2021-02-11

## 2021-02-11 RX ORDER — METOPROLOL TARTRATE 50 MG
5 TABLET ORAL EVERY 6 HOURS
Refills: 0 | Status: DISCONTINUED | OUTPATIENT
Start: 2021-02-11 | End: 2021-02-11

## 2021-02-11 RX ORDER — MAGNESIUM SULFATE 500 MG/ML
4 VIAL (ML) INJECTION ONCE
Refills: 0 | Status: DISCONTINUED | OUTPATIENT
Start: 2021-02-11 | End: 2021-02-11

## 2021-02-11 RX ORDER — GLUCAGON INJECTION, SOLUTION 0.5 MG/.1ML
1 INJECTION, SOLUTION SUBCUTANEOUS ONCE
Refills: 0 | Status: DISCONTINUED | OUTPATIENT
Start: 2021-02-11 | End: 2021-02-11

## 2021-02-11 RX ORDER — ACETAMINOPHEN 500 MG
1000 TABLET ORAL ONCE
Refills: 0 | Status: COMPLETED | OUTPATIENT
Start: 2021-02-11 | End: 2021-02-11

## 2021-02-11 RX ORDER — DEXTROSE 50 % IN WATER 50 %
12.5 SYRINGE (ML) INTRAVENOUS ONCE
Refills: 0 | Status: DISCONTINUED | OUTPATIENT
Start: 2021-02-11 | End: 2021-02-11

## 2021-02-11 RX ORDER — DEXTROSE 50 % IN WATER 50 %
25 SYRINGE (ML) INTRAVENOUS ONCE
Refills: 0 | Status: DISCONTINUED | OUTPATIENT
Start: 2021-02-11 | End: 2021-02-13

## 2021-02-11 RX ORDER — SODIUM CHLORIDE 9 MG/ML
500 INJECTION, SOLUTION INTRAVENOUS ONCE
Refills: 0 | Status: COMPLETED | OUTPATIENT
Start: 2021-02-11 | End: 2021-02-11

## 2021-02-11 RX ORDER — DEXTROSE 50 % IN WATER 50 %
25 SYRINGE (ML) INTRAVENOUS ONCE
Refills: 0 | Status: DISCONTINUED | OUTPATIENT
Start: 2021-02-11 | End: 2021-02-11

## 2021-02-11 RX ORDER — POTASSIUM CHLORIDE 20 MEQ
20 PACKET (EA) ORAL
Refills: 0 | Status: COMPLETED | OUTPATIENT
Start: 2021-02-11 | End: 2021-02-11

## 2021-02-11 RX ORDER — MAGNESIUM SULFATE 500 MG/ML
2 VIAL (ML) INJECTION
Refills: 0 | Status: COMPLETED | OUTPATIENT
Start: 2021-02-11 | End: 2021-02-11

## 2021-02-11 RX ORDER — SODIUM CHLORIDE 9 MG/ML
250 INJECTION INTRAMUSCULAR; INTRAVENOUS; SUBCUTANEOUS ONCE
Refills: 0 | Status: COMPLETED | OUTPATIENT
Start: 2021-02-11 | End: 2021-02-11

## 2021-02-11 RX ORDER — SODIUM CHLORIDE 9 MG/ML
1000 INJECTION, SOLUTION INTRAVENOUS
Refills: 0 | Status: DISCONTINUED | OUTPATIENT
Start: 2021-02-11 | End: 2021-02-13

## 2021-02-11 RX ORDER — FENTANYL CITRATE 50 UG/ML
25 INJECTION INTRAVENOUS ONCE
Refills: 0 | Status: DISCONTINUED | OUTPATIENT
Start: 2021-02-11 | End: 2021-02-11

## 2021-02-11 RX ORDER — METOPROLOL TARTRATE 50 MG
2.5 TABLET ORAL EVERY 6 HOURS
Refills: 0 | Status: DISCONTINUED | OUTPATIENT
Start: 2021-02-11 | End: 2021-02-14

## 2021-02-11 RX ORDER — CEFEPIME 1 G/1
1000 INJECTION, POWDER, FOR SOLUTION INTRAMUSCULAR; INTRAVENOUS EVERY 12 HOURS
Refills: 0 | Status: DISCONTINUED | OUTPATIENT
Start: 2021-02-11 | End: 2021-02-16

## 2021-02-11 RX ORDER — METRONIDAZOLE 500 MG
500 TABLET ORAL EVERY 8 HOURS
Refills: 0 | Status: DISCONTINUED | OUTPATIENT
Start: 2021-02-11 | End: 2021-02-18

## 2021-02-11 RX ORDER — DEXTROSE 50 % IN WATER 50 %
15 SYRINGE (ML) INTRAVENOUS ONCE
Refills: 0 | Status: DISCONTINUED | OUTPATIENT
Start: 2021-02-11 | End: 2021-02-11

## 2021-02-11 RX ORDER — CHLORHEXIDINE GLUCONATE 213 G/1000ML
15 SOLUTION TOPICAL EVERY 12 HOURS
Refills: 0 | Status: DISCONTINUED | OUTPATIENT
Start: 2021-02-11 | End: 2021-02-12

## 2021-02-11 RX ORDER — INSULIN LISPRO 100/ML
VIAL (ML) SUBCUTANEOUS
Refills: 0 | Status: DISCONTINUED | OUTPATIENT
Start: 2021-02-11 | End: 2021-02-13

## 2021-02-11 RX ORDER — DEXMEDETOMIDINE HYDROCHLORIDE IN 0.9% SODIUM CHLORIDE 4 UG/ML
0.2 INJECTION INTRAVENOUS
Qty: 200 | Refills: 0 | Status: DISCONTINUED | OUTPATIENT
Start: 2021-02-11 | End: 2021-02-11

## 2021-02-11 RX ORDER — HEPARIN SODIUM 5000 [USP'U]/ML
5000 INJECTION INTRAVENOUS; SUBCUTANEOUS EVERY 8 HOURS
Refills: 0 | Status: DISCONTINUED | OUTPATIENT
Start: 2021-02-11 | End: 2021-02-22

## 2021-02-11 RX ORDER — CHLORHEXIDINE GLUCONATE 213 G/1000ML
1 SOLUTION TOPICAL DAILY
Refills: 0 | Status: DISCONTINUED | OUTPATIENT
Start: 2021-02-11 | End: 2021-02-22

## 2021-02-11 RX ADMIN — Medication 400 MILLIGRAM(S): at 22:53

## 2021-02-11 RX ADMIN — SODIUM CHLORIDE 1000 MILLILITER(S): 9 INJECTION, SOLUTION INTRAVENOUS at 09:50

## 2021-02-11 RX ADMIN — Medication 25 GRAM(S): at 09:44

## 2021-02-11 RX ADMIN — SODIUM CHLORIDE 125 MILLILITER(S): 9 INJECTION, SOLUTION INTRAVENOUS at 04:41

## 2021-02-11 RX ADMIN — PANTOPRAZOLE SODIUM 40 MILLIGRAM(S): 20 TABLET, DELAYED RELEASE ORAL at 12:40

## 2021-02-11 RX ADMIN — Medication 100 MILLIGRAM(S): at 17:19

## 2021-02-11 RX ADMIN — Medication 100 MILLIEQUIVALENT(S): at 08:39

## 2021-02-11 RX ADMIN — Medication 25 GRAM(S): at 09:00

## 2021-02-11 RX ADMIN — CHLORHEXIDINE GLUCONATE 15 MILLILITER(S): 213 SOLUTION TOPICAL at 17:20

## 2021-02-11 RX ADMIN — HEPARIN SODIUM 5000 UNIT(S): 5000 INJECTION INTRAVENOUS; SUBCUTANEOUS at 06:55

## 2021-02-11 RX ADMIN — HEPARIN SODIUM 5000 UNIT(S): 5000 INJECTION INTRAVENOUS; SUBCUTANEOUS at 13:04

## 2021-02-11 RX ADMIN — DEXMEDETOMIDINE HYDROCHLORIDE IN 0.9% SODIUM CHLORIDE 3.38 MICROGRAM(S)/KG/HR: 4 INJECTION INTRAVENOUS at 04:40

## 2021-02-11 RX ADMIN — SODIUM CHLORIDE 3000 MILLILITER(S): 9 INJECTION, SOLUTION INTRAVENOUS at 09:00

## 2021-02-11 RX ADMIN — CEFEPIME 100 MILLIGRAM(S): 1 INJECTION, POWDER, FOR SOLUTION INTRAMUSCULAR; INTRAVENOUS at 17:19

## 2021-02-11 RX ADMIN — SODIUM CHLORIDE 3000 MILLILITER(S): 9 INJECTION INTRAMUSCULAR; INTRAVENOUS; SUBCUTANEOUS at 10:14

## 2021-02-11 RX ADMIN — CHLORHEXIDINE GLUCONATE 1 APPLICATION(S): 213 SOLUTION TOPICAL at 12:40

## 2021-02-11 RX ADMIN — Medication 100 MILLIGRAM(S): at 09:27

## 2021-02-11 RX ADMIN — Medication 100 MILLIEQUIVALENT(S): at 09:27

## 2021-02-11 RX ADMIN — FENTANYL CITRATE 25 MICROGRAM(S): 50 INJECTION INTRAVENOUS at 03:40

## 2021-02-11 RX ADMIN — HEPARIN SODIUM 5000 UNIT(S): 5000 INJECTION INTRAVENOUS; SUBCUTANEOUS at 22:10

## 2021-02-11 RX ADMIN — FENTANYL CITRATE 25 MICROGRAM(S): 50 INJECTION INTRAVENOUS at 03:50

## 2021-02-11 RX ADMIN — FENTANYL CITRATE 25 MICROGRAM(S): 50 INJECTION INTRAVENOUS at 06:54

## 2021-02-11 NOTE — BRIEF OPERATIVE NOTE - OPERATION/FINDINGS
Frankly necrotic small bowel, 165cm  Ischemic ascending colon and cecum, pale, and thrombosed mesentery with weakly palpable pulse  large, complex ventral hernia with multiple defects, small bowel was twisted about its mesentery and herniated  Proximally dilated small bowel, erythematous  Side to side anti-peristaltic anastomosis, 80mm RUTH blue load Frankly necrotic small bowel, 165cm resected  Ischemic ascending colon and cecum, pale, and thrombosed mesentery with weakly palpable pulse  large, complex ventral hernia with multiple defects, small bowel was twisted about its mesentery and herniated  Small bowel ran multiple times, proximally dilated small bowel, erythematous  Side to side anti-peristaltic anastomosis, 80mm RUTH blue stapler load  Fascia closed and wound vac placed

## 2021-02-11 NOTE — PROGRESS NOTE ADULT - ASSESSMENT
55 years old female admitted due to incarcerated umbilical hernia, S/P Exploratory laparotomy, small bowel resection, right hemicolectomy. Currently in SICU, intubated, on mechanical ventilation, no pressors with the above physical exam and lab results    Plan  - Continue NPO  - Monitor Vital signs  - Pain control as needed  - NGT insertion and F/U output  - CBC, BMP, and electrolytes, replete as needed  - GI and DVT prophylaxis  - F/U SICU recommendation  - Continue current management

## 2021-02-11 NOTE — BRIEF OPERATIVE NOTE - NSICDXBRIEFPOSTOP_GEN_ALL_CORE_FT
POST-OP DIAGNOSIS:  Ventral hernia with gangrene and obstruction 11-Feb-2021 03:24:40  Joseph Cramer T

## 2021-02-11 NOTE — PROGRESS NOTE ADULT - SUBJECTIVE AND OBJECTIVE BOX
GENERAL SURGERY PROGRESS NOTE     MELISSA CARUSO  55y  Female  Hospital day :1d  POD:  Procedure: Negative pressure wound therapy, greater than 50 sq cm    Open repair of strangulated ventral hernia    Abdominal washout    Right hemicolectomy    Resection of small bowel    Exploratory laparotomy      EVENTS IN THE LAST 24 HRS: Patient stable, went to OR due to incarcerated umbilical hernia, small bowel resected with hemicolectomy. Patient remains intubated, on mechanical ventilation    T(F): 97.4 (02-11-21 @ 06:00), Max: 98.9 (02-11-21 @ 03:12)  HR: 99 (02-11-21 @ 07:00) (89 - 112)  BP: 95/54 (02-11-21 @ 05:00) (95/51 - 188/86)  ABP: 95/63 (02-11-21 @ 07:00) (94/57 - 181/91)  ABP(mean): 76 (02-11-21 @ 07:00) (72 - 126)  RR: 22 (02-11-21 @ 07:00) (18 - 23)  SpO2: 100% (02-11-21 @ 07:00) (97% - 100%)    PHYSICAL EXAM:  GENERAL: NAD,   CHEST/LUNG: Clear to auscultation bilaterally  HEART: Regular rate and rhythm  ABDOMEN: Soft, Nontender, mild distended; VAC in place  EXTREMITIES:  No clubbing, cyanosis, or edema      DIET/FLUIDS: dextrose 5%. 1000 milliLiter(s) IV Continuous <Continuous>  dextrose 5%. 1000 milliLiter(s) IV Continuous <Continuous>  lactated ringers. 1000 milliLiter(s) IV Continuous <Continuous>      URINE:   02-10-21 @ 07:01  -  02-11-21 @ 07:00  --------------------------------------------------------  OUT: 175 mL     Indwelling Urethral Catheter:     Connect To:  Straight Drainage/Gravity    Indication:  Urine Output Monitoring in Critically Ill (02-11-21 @ 03:35)    GI proph:  pantoprazole  Injectable 40 milliGRAM(s) IV Push daily    AC/ proph: heparin   Injectable 5000 Unit(s) SubCutaneous every 8 hours    ABx: cefepime   IVPB 2000 milliGRAM(s) IV Intermittent every 12 hours  metroNIDAZOLE  IVPB 500 milliGRAM(s) IV Intermittent every 8 hours  piperacillin/tazobactam IVPB... 3.375 Gram(s) IV Intermittent once          LABS  Labs:  CAPILLARY BLOOD GLUCOSE      POCT Blood Glucose.: 136 mg/dL (11 Feb 2021 06:58)                          11.6   7.92  )-----------( 212      ( 11 Feb 2021 03:21 )             32.6       Auto Neutrophil %: 70.2 % (02-10-21 @ 17:56)  Band Neutrophils %: 7.9 % (02-10-21 @ 17:56)    02-11    139  |  104  |  77<HH>  ----------------------------<  124<H>  3.7   |  17  |  2.9<H>      Calcium, Total Serum: 6.7 mg/dL (02-11-21 @ 03:21)      LFTs:             6.5  | 1.1  | 21       ------------------[66      ( 10 Feb 2021 17:56 )  3.8  | x    | 20          Lipase:9      Amylase:x         Blood Gas Arterial, Lactate: 1.7 mmoL/L (02-11-21 @ 04:18)  Blood Gas Arterial, Lactate: 4.3 mmoL/L (02-11-21 @ 01:03)  Blood Gas Venous - Lactate: 4.3 mmoL/L (02-10-21 @ 18:59)  Lactate, Blood: 4.7 mmol/L (02-10-21 @ 17:56)    ABG - ( 11 Feb 2021 04:18 )  pH: 7.33  /  pCO2: 36    /  pO2: 152   / HCO3: 19    / Base Excess: -6.4  /  SaO2: 99              ABG - ( 11 Feb 2021 01:03 )  pH: 7.30  /  pCO2: 38    /  pO2: 172   / HCO3: 18    / Base Excess: -7.5  /  SaO2: 99                Coags:     13.30  ----< 1.16    ( 10 Feb 2021 22:50 )     27.1        CARDIAC MARKERS ( 11 Feb 2021 03:21 )  x     / 0.08 ng/mL / x     / x     / x

## 2021-02-11 NOTE — PACU DISCHARGE NOTE - COMMENTS
Pt tolerated procedure  Perioperative course uneventful  No obvious anesthesia related issues  Sign out given to SICU Team

## 2021-02-11 NOTE — CONSULT NOTE ADULT - ASSESSMENT
ASSESSMENT:   Patient is a 55 F with PMH HTN, HLD, DM 2, with strangulated ventral hernia s/p 165 cm SBR/Right hemicolectomy with primary anastamosis (2/11)    PLAN:   Neuro:   Sedation: Precedex   Pain:  Fentanyl PRN       Pulm:   RR (machine): 22, TV (machine): 400, FiO2: 50, PEEP: 5, ITime: 1  02-11 @ 01:03--7.30 / 38 / 172 / 18 / 99   Post-op ABG pending       Cardio:   Hypotensive intraop requiring pushes of neosynephrine   Hx of HTN     holding home medication ATENOLOL  100 MG    will restart if hemodynamically stable   Trop:  pending x3  Post op EKG NSR, QTc 492      GI:   s/p 165 cm SBR/Right hemicolectomy with primary anastamosis  Diet:  NPO (NGT to low continuous suction)   GI ppx: PPI   Bowel reg:  holding       :   IVF:    Tvaarez:  yes  Follow up urine output hourly   Follow up post op labs       Heme:   DVT propylaxis-heparin   Injectable  Hb/Hct:  16.3/45.1  -->  Plts:  308  -->      ID:   Leukocytosis- 19.78  --->>  Abx: zosyn        Endo:    Glucose:    A1c:  pending   Holding home medications: metformin 1 g BID, lantus 33 U QHS, trulicity .75 mg Qweek  Will start SSI     Lines/Tubes/Drains:    Midline wound vac   Right IJ central line   Right radial arterial line   Tavarez   PIVs

## 2021-02-11 NOTE — CONSULT NOTE ADULT - CONSULT REASON
Patient is a 55 F with PMH HTN, HLD, DM 2, with strangulated ventral hernia s/p 165 cm SBR/Right hemicolectomy with primary anastamosis (2/11)

## 2021-02-12 LAB
ANION GAP SERPL CALC-SCNC: 17 MMOL/L — HIGH (ref 7–14)
ANION GAP SERPL CALC-SCNC: 17 MMOL/L — HIGH (ref 7–14)
APTT BLD: 32.7 SEC — SIGNIFICANT CHANGE UP (ref 27–39.2)
BUN SERPL-MCNC: 68 MG/DL — CRITICAL HIGH (ref 10–20)
BUN SERPL-MCNC: 70 MG/DL — CRITICAL HIGH (ref 10–20)
CALCIUM SERPL-MCNC: 7.1 MG/DL — LOW (ref 8.5–10.1)
CALCIUM SERPL-MCNC: 7.5 MG/DL — LOW (ref 8.5–10.1)
CHLORIDE SERPL-SCNC: 105 MMOL/L — SIGNIFICANT CHANGE UP (ref 98–110)
CHLORIDE SERPL-SCNC: 108 MMOL/L — SIGNIFICANT CHANGE UP (ref 98–110)
CO2 SERPL-SCNC: 16 MMOL/L — LOW (ref 17–32)
CO2 SERPL-SCNC: 16 MMOL/L — LOW (ref 17–32)
CREAT SERPL-MCNC: 2.2 MG/DL — HIGH (ref 0.7–1.5)
CREAT SERPL-MCNC: 2.3 MG/DL — HIGH (ref 0.7–1.5)
GLUCOSE BLDC GLUCOMTR-MCNC: 132 MG/DL — HIGH (ref 70–99)
GLUCOSE BLDC GLUCOMTR-MCNC: 133 MG/DL — HIGH (ref 70–99)
GLUCOSE BLDC GLUCOMTR-MCNC: 134 MG/DL — HIGH (ref 70–99)
GLUCOSE BLDC GLUCOMTR-MCNC: 150 MG/DL — HIGH (ref 70–99)
GLUCOSE SERPL-MCNC: 120 MG/DL — HIGH (ref 70–99)
GLUCOSE SERPL-MCNC: 124 MG/DL — HIGH (ref 70–99)
HCT VFR BLD CALC: 24.5 % — LOW (ref 37–47)
HCT VFR BLD CALC: 25.3 % — LOW (ref 37–47)
HGB BLD-MCNC: 8.8 G/DL — LOW (ref 12–16)
HGB BLD-MCNC: 9 G/DL — LOW (ref 12–16)
INR BLD: 4.63 RATIO — HIGH (ref 0.65–1.3)
MAGNESIUM SERPL-MCNC: 2.5 MG/DL — HIGH (ref 1.8–2.4)
MCHC RBC-ENTMCNC: 30.6 PG — SIGNIFICANT CHANGE UP (ref 27–31)
MCHC RBC-ENTMCNC: 31 PG — SIGNIFICANT CHANGE UP (ref 27–31)
MCHC RBC-ENTMCNC: 35.6 G/DL — SIGNIFICANT CHANGE UP (ref 32–37)
MCHC RBC-ENTMCNC: 35.9 G/DL — SIGNIFICANT CHANGE UP (ref 32–37)
MCV RBC AUTO: 86.1 FL — SIGNIFICANT CHANGE UP (ref 81–99)
MCV RBC AUTO: 86.3 FL — SIGNIFICANT CHANGE UP (ref 81–99)
NRBC # BLD: 0 /100 WBCS — SIGNIFICANT CHANGE UP (ref 0–0)
NRBC # BLD: 0 /100 WBCS — SIGNIFICANT CHANGE UP (ref 0–0)
PHOSPHATE SERPL-MCNC: 4.9 MG/DL — SIGNIFICANT CHANGE UP (ref 2.1–4.9)
PLATELET # BLD AUTO: 152 K/UL — SIGNIFICANT CHANGE UP (ref 130–400)
PLATELET # BLD AUTO: 165 K/UL — SIGNIFICANT CHANGE UP (ref 130–400)
POTASSIUM SERPL-MCNC: 3.4 MMOL/L — LOW (ref 3.5–5)
POTASSIUM SERPL-MCNC: 3.8 MMOL/L — SIGNIFICANT CHANGE UP (ref 3.5–5)
POTASSIUM SERPL-SCNC: 3.4 MMOL/L — LOW (ref 3.5–5)
POTASSIUM SERPL-SCNC: 3.8 MMOL/L — SIGNIFICANT CHANGE UP (ref 3.5–5)
PROTHROM AB SERPL-ACNC: >40 SEC — HIGH (ref 9.95–12.87)
RBC # BLD: 2.84 M/UL — LOW (ref 4.2–5.4)
RBC # BLD: 2.94 M/UL — LOW (ref 4.2–5.4)
RBC # FLD: 13.5 % — SIGNIFICANT CHANGE UP (ref 11.5–14.5)
RBC # FLD: 13.7 % — SIGNIFICANT CHANGE UP (ref 11.5–14.5)
SODIUM SERPL-SCNC: 138 MMOL/L — SIGNIFICANT CHANGE UP (ref 135–146)
SODIUM SERPL-SCNC: 141 MMOL/L — SIGNIFICANT CHANGE UP (ref 135–146)
TROPONIN T SERPL-MCNC: 0.08 NG/ML — CRITICAL HIGH
WBC # BLD: 10.75 K/UL — SIGNIFICANT CHANGE UP (ref 4.8–10.8)
WBC # BLD: 14.53 K/UL — HIGH (ref 4.8–10.8)
WBC # FLD AUTO: 10.75 K/UL — SIGNIFICANT CHANGE UP (ref 4.8–10.8)
WBC # FLD AUTO: 14.53 K/UL — HIGH (ref 4.8–10.8)

## 2021-02-12 PROCEDURE — 99291 CRITICAL CARE FIRST HOUR: CPT

## 2021-02-12 PROCEDURE — 71045 X-RAY EXAM CHEST 1 VIEW: CPT | Mod: 26

## 2021-02-12 PROCEDURE — 36573 INSJ PICC RS&I 5 YR+: CPT

## 2021-02-12 RX ORDER — MORPHINE SULFATE 50 MG/1
2 CAPSULE, EXTENDED RELEASE ORAL ONCE
Refills: 0 | Status: DISCONTINUED | OUTPATIENT
Start: 2021-02-12 | End: 2021-02-12

## 2021-02-12 RX ORDER — SODIUM CHLORIDE 9 MG/ML
1000 INJECTION, SOLUTION INTRAVENOUS
Refills: 0 | Status: DISCONTINUED | OUTPATIENT
Start: 2021-02-12 | End: 2021-02-13

## 2021-02-12 RX ORDER — POTASSIUM CHLORIDE 20 MEQ
20 PACKET (EA) ORAL ONCE
Refills: 0 | Status: COMPLETED | OUTPATIENT
Start: 2021-02-12 | End: 2021-02-12

## 2021-02-12 RX ORDER — INFLUENZA VIRUS VACCINE 15; 15; 15; 15 UG/.5ML; UG/.5ML; UG/.5ML; UG/.5ML
0.5 SUSPENSION INTRAMUSCULAR ONCE
Refills: 0 | Status: DISCONTINUED | OUTPATIENT
Start: 2021-02-12 | End: 2021-02-17

## 2021-02-12 RX ORDER — INSULIN LISPRO 100/ML
VIAL (ML) SUBCUTANEOUS AT BEDTIME
Refills: 0 | Status: DISCONTINUED | OUTPATIENT
Start: 2021-02-12 | End: 2021-02-13

## 2021-02-12 RX ORDER — GLUCAGON INJECTION, SOLUTION 0.5 MG/.1ML
1 INJECTION, SOLUTION SUBCUTANEOUS ONCE
Refills: 0 | Status: DISCONTINUED | OUTPATIENT
Start: 2021-02-12 | End: 2021-02-14

## 2021-02-12 RX ORDER — ACETAMINOPHEN 500 MG
650 TABLET ORAL ONCE
Refills: 0 | Status: COMPLETED | OUTPATIENT
Start: 2021-02-12 | End: 2021-02-12

## 2021-02-12 RX ORDER — ELECTROLYTE SOLUTION,INJ
1 VIAL (ML) INTRAVENOUS
Refills: 0 | Status: DISCONTINUED | OUTPATIENT
Start: 2021-02-12 | End: 2021-02-13

## 2021-02-12 RX ORDER — ACETAMINOPHEN 500 MG
1000 TABLET ORAL ONCE
Refills: 0 | Status: COMPLETED | OUTPATIENT
Start: 2021-02-12 | End: 2021-02-12

## 2021-02-12 RX ORDER — DEXTROSE 50 % IN WATER 50 %
15 SYRINGE (ML) INTRAVENOUS ONCE
Refills: 0 | Status: DISCONTINUED | OUTPATIENT
Start: 2021-02-12 | End: 2021-02-13

## 2021-02-12 RX ADMIN — CHLORHEXIDINE GLUCONATE 1 APPLICATION(S): 213 SOLUTION TOPICAL at 07:31

## 2021-02-12 RX ADMIN — Medication 2.5 MILLIGRAM(S): at 17:48

## 2021-02-12 RX ADMIN — Medication 2.5 MILLIGRAM(S): at 05:46

## 2021-02-12 RX ADMIN — HEPARIN SODIUM 5000 UNIT(S): 5000 INJECTION INTRAVENOUS; SUBCUTANEOUS at 21:42

## 2021-02-12 RX ADMIN — Medication 100 MILLIEQUIVALENT(S): at 02:24

## 2021-02-12 RX ADMIN — SODIUM CHLORIDE 125 MILLILITER(S): 9 INJECTION, SOLUTION INTRAVENOUS at 11:22

## 2021-02-12 RX ADMIN — SODIUM CHLORIDE 125 MILLILITER(S): 9 INJECTION, SOLUTION INTRAVENOUS at 02:44

## 2021-02-12 RX ADMIN — Medication 260 MILLIGRAM(S): at 17:47

## 2021-02-12 RX ADMIN — CEFEPIME 100 MILLIGRAM(S): 1 INJECTION, POWDER, FOR SOLUTION INTRAMUSCULAR; INTRAVENOUS at 17:47

## 2021-02-12 RX ADMIN — CEFEPIME 100 MILLIGRAM(S): 1 INJECTION, POWDER, FOR SOLUTION INTRAMUSCULAR; INTRAVENOUS at 05:46

## 2021-02-12 RX ADMIN — HEPARIN SODIUM 5000 UNIT(S): 5000 INJECTION INTRAVENOUS; SUBCUTANEOUS at 05:46

## 2021-02-12 RX ADMIN — Medication 100 MILLIGRAM(S): at 23:48

## 2021-02-12 RX ADMIN — PANTOPRAZOLE SODIUM 40 MILLIGRAM(S): 20 TABLET, DELAYED RELEASE ORAL at 12:32

## 2021-02-12 RX ADMIN — Medication 100 MILLIGRAM(S): at 00:43

## 2021-02-12 RX ADMIN — Medication 2.5 MILLIGRAM(S): at 11:22

## 2021-02-12 RX ADMIN — Medication 100 MILLIGRAM(S): at 17:39

## 2021-02-12 RX ADMIN — CHLORHEXIDINE GLUCONATE 15 MILLILITER(S): 213 SOLUTION TOPICAL at 05:46

## 2021-02-12 RX ADMIN — HEPARIN SODIUM 5000 UNIT(S): 5000 INJECTION INTRAVENOUS; SUBCUTANEOUS at 14:08

## 2021-02-12 RX ADMIN — Medication 100 MILLIGRAM(S): at 08:26

## 2021-02-12 RX ADMIN — Medication 1 EACH: at 21:41

## 2021-02-12 RX ADMIN — Medication 400 MILLIGRAM(S): at 23:48

## 2021-02-12 RX ADMIN — Medication 2.5 MILLIGRAM(S): at 23:36

## 2021-02-12 RX ADMIN — MORPHINE SULFATE 2 MILLIGRAM(S): 50 CAPSULE, EXTENDED RELEASE ORAL at 14:21

## 2021-02-12 NOTE — PROGRESS NOTE ADULT - ASSESSMENT
ASSESSMENT:   Patient is a 55 F with PMH HTN, HLD, DM 2, with strangulated ventral hernia s/p 165 cm SBR/Right hemicolectomy with primary anastamosis (2/11)    PLAN:   Neuro: A&O x3, resting comfortably  Pain control: IV Tylenol prn      Pulm:   Extubated (2/11) - on RA, sat 99%  AM CXR    Cardio:   Hx of HTN - decreased Metoprolol Tartrate to 2.5mg q 6h   Trops-0.08-->0.10 > 0.08, likely 2/2 demand (intraop hypotension requiring multiple pushes of Neosynephrine)  Post op EKG NSR, QTc 492, (2/11) qtc 454  ECHO (02/11) EF 63%, G1DD, mod RVE, mild TR/MR    GI:   s/p Right hemicolectomy with primary anastamosis, SBR 165cm  Diet:  NPO   NGT to low continuous suction - monitor output  GI ppx: PPI   Bowel reg:  holding   abdominal vac in place    :   IVF:  LR @ 125cc/hr  Tavarez - monitor UO, 80-200cc/hr  DERRICK (baseline Cr unknown; no known renal hx)   BUN/Cr- (max 3.6) >>60/2.7 > 70/2.3 > 68/2.2  Monitor & replete elytes prn      Heme:   DVT propylaxis-heparin   Injectable  Hgb downtrending, likely diluted after IVF resuscitaiton - Hgb 12 > 9  -continue to monitor    ID:   Leukocytosis- 7.5 > 11  Afebrile  Antibiotics-cefepime   IVPB 2000 every 12 hours  metroNIDAZOLE  IVPB 500 every 8 hours    Endo:    A1c:  pending   Holding home medications: metformin 1 g BID, lantus 33 U QHS, trulicity .75 mg Qweek  on ISS     Lines/Tubes/Drains:    Midline wound vac   Right IJ central line   Right radial arterial line   Tavarez   PIVs      Dispo: SICU

## 2021-02-12 NOTE — PROGRESS NOTE ADULT - SUBJECTIVE AND OBJECTIVE BOX
MELISSA CARUSO  [id number]  55y Female    Indication for ICU admission:  Admit Date:02-10-21  ICU Date:  OR Date:    Allergies:   No Known Allergies    PAST MEDICAL & SURGICAL HISTORY:  Marijuana use, continuous    Hypertension    Diabetes    No pertinent past medical history    S/P tubal ligation  20 years ago      Home Medications:  ATENOLOL  100 MG TABS:  (2021 02:18)  ATORVASTATIN 40MG TABLETS: TAKE 1 TABLET BY MOUTH EVERY NIGHT AT BEDTIME (2021 02:18)  predniSONE: orally once a day (2021 02:18)  Robaxin 500 mg oral tablet: orally 2 times a day (2021 02:18)        24HRS EVENT:  Day  dec Fio2 to 40%  tachy, nicom 30% (w/ 500LR), 500cc bolus, repeat nicom neg (1.25L), tachy resolved   echo - EF 63%, G1DD, mod RVE, mild TR/MR  CE trending up 0.10, 1600-pending  f/u ekg   f/u pm abg  decr metop to 2.5mg q 6h  passed SBT in pm, extubated     NIGHT:  -c/o pain: IV Tylenol x1  -trop downtrending 0.1 > 0.08  -Hgb drop 12 > 9 - f/u 11am CBC  -Hypokalemic 3.4, repleted w/ 20KCl in setting of DERRICK - f/u 430am BMP      DVT PTX: HSQ    GI PTX: pantoprazole  Injectable 40 milliGRAM(s) IV Push daily      REVIEW OF SYSTEMS    [ ] A ten-point review of systems was otherwise negative except as noted.  [ x ] Due to altered mental status/intubation, subjective information were not able to be obtained from the patient. History was obtained, to the extent possible, from review of the chart and collateral sources of information.    Daily     Daily Weight in k.7 (2021 04:00)    Diet, NPO (21 @ 18:43)      CURRENT MEDS:  Neurologic Medications    Respiratory Medications    Cardiovascular Medications  metoprolol tartrate Injectable 2.5 milliGRAM(s) IV Push every 6 hours    Gastrointestinal Medications  lactated ringers. 1000 milliLiter(s) IV Continuous <Continuous>  pantoprazole  Injectable 40 milliGRAM(s) IV Push daily    Genitourinary Medications    Hematologic/Oncologic Medications  heparin   Injectable 5000 Unit(s) SubCutaneous every 8 hours  influenza   Vaccine 0.5 milliLiter(s) IntraMuscular once    Antimicrobial/Immunologic Medications  cefepime   IVPB 1000 milliGRAM(s) IV Intermittent every 12 hours  metroNIDAZOLE  IVPB 500 milliGRAM(s) IV Intermittent every 8 hours    Endocrine/Metabolic Medications  dextrose 50% Injectable 25 Gram(s) IV Push once  insulin lispro (ADMELOG) corrective regimen sliding scale   SubCutaneous three times a day before meals    Topical/Other Medications  chlorhexidine 0.12% Liquid 15 milliLiter(s) Oral Mucosa every 12 hours  chlorhexidine 4% Liquid 1 Application(s) Topical daily      ICU Vital Signs Last 24 Hrs  T(C): 36.4 (2021 04:00), Max: 37.8 (2021 20:00)  T(F): 97.6 (2021 04:00), Max: 100 (2021 20:00)  HR: 92 (2021 07:00) (74 - 106)  BP: 103/51 (2021 14:00) (103/51 - 103/51)  BP(mean): 73 (2021 14:00) (73 - 73)  ABP: 121/62 (2021 07:00) (87/57 - 141/70)  ABP(mean): 85 (2021 07:00) (67 - 96)  RR: 17 (2021 04:00) (16 - 17)  SpO2: 99% (2021 07:00) (98% - 100%)      Adult Advanced Hemodynamics Last 24 Hrs  CVP(mm Hg): --  CVP(cm H2O): --  CO: --  CI: --  PA: --  PA(mean): --  PCWP: --  SVR: --  SVRI: --  PVR: --  PVRI: --    Mode: CPAP with PS  PEEP: 5  PS: 5    ABG - ( 2021 19:32 )  pH, Arterial: 7.42  pH, Blood: x     /  pCO2: 26    /  pO2: 71    / HCO3: 17    / Base Excess: -6.8  /  SaO2: 95                  I&O's Summary    2021 07:01  -  2021 07:00  --------------------------------------------------------  IN: 4798.3 mL / OUT: 3370 mL / NET: 1428.3 mL      I&O's Detail    2021 07:01  -  2021 07:00  --------------------------------------------------------  IN:    Dexmedetomidine: 48.3 mL    IV PiggyBack: 50 mL    IV PiggyBack: 100 mL    IV PiggyBack: 600 mL    Lactated Ringers: 3000 mL    Lactated Ringers Bolus: 1000 mL  Total IN: 4798.3 mL    OUT:    Indwelling Catheter - Urethral (mL): 2020 mL    Nasogastric/Oral tube (mL): 1350 mL  Total OUT: 3370 mL    Total NET: 1428.3 mL    PHYSICAL EXAM:    General/Neuro  GCS:   15  Alert & oriented x 3, no focal deficits    Lungs: clear to auscultation, Normal expansion/effort.     Cardiovascular : S1, S2.  Regular rate and rhythm.  Peripheral edema   Cardiac Rhythm: Normal Sinus Rhythm    GI: Abdomen soft, Non-distended, Incision is mildly TTP  Wound: midline incision with wound vac in place    Extremities: Extremities warm, pink, well-perfused. Pulses:Rt     Lt    Derm: Good skin turgor, no skin breakdown.      :  Tavarez catheter in place.    LABS:  CAPILLARY BLOOD GLUCOSE      POCT Blood Glucose.: 134 mg/dL (2021 05:38)  POCT Blood Glucose.: 135 mg/dL (2021 23:17)  POCT Blood Glucose.: 128 mg/dL (2021 17:22)  POCT Blood Glucose.: 117 mg/dL (2021 11:33)                          12.0   7.55  )-----------( 213      ( 2021 06:35 )             33.4       02-12    141  |  108  |  68<HH>  ----------------------------<  124<H>  3.8   |  16<L>  |  2.2<H>    Ca    7.5<L>      2021 04:30  Phos  5.3     02-  Mg     2.4     02-11    TPro  4.6<L>  /  Alb  2.8<L>  /  TBili  1.0  /  DBili  x   /  AST  17  /  ALT  15  /  AlkPhos  45  02-11      PT/INR - ( 2021 06:35 )   PT: 13.80 sec;   INR: 1.20 ratio         PTT - ( 2021 06:35 )  PTT:26.2 sec  CARDIAC MARKERS ( 2021 23:30 )  x     / 0.08 ng/mL / x     / x     / x      CARDIAC MARKERS ( 2021 11:30 )  x     / 0.10 ng/mL / x     / x     / x      CARDIAC MARKERS ( 2021 06:35 )  x     / 0.08 ng/mL / x     / x     / x      CARDIAC MARKERS ( 2021 03:21 )  x     / 0.08 ng/mL / x     / x     / x          Culture - Blood (collected 10 Feb 2021 19:45)  Source: .Blood Blood-Peripheral  Preliminary Report (2021 01:02):    No growth to date.

## 2021-02-12 NOTE — CHART NOTE - NSCHARTNOTEFT_GEN_A_CORE
NUTRITION SUPPORT CONSULTATION    HPI:  55y female with a past medical hx of HTN, Hyperlipidemia, DM, presents to the ED complaining of increased pain from a chronic umbilical hernia. She states that she first noticed this hernia about 20 years ago, immediately after a tubal ligation procedure, and it has been slowly enlarging since. She states that it always has been uncomfortable, but never this painful. The severe pain is rated 10/10 and is accompanied with nausea and vomiting, of 3 days duration. She states she is not passing gas and that her last bowel movement was 1 week ago.  (10 Feb 2021 22:33)    Found to have strangulated ventral hernia, OR 2/11 s/p 165 cm SBR/Right hemicolectomy with primary anastomosis     · Operative Findings  Frankly necrotic small bowel, 165cm resected  Ischemic ascending colon and cecum, pale, and thrombosed mesentery with weakly palpable pulse  large, complex ventral hernia with multiple defects, small bowel was twisted about its mesentery and herniated  Small bowel ran multiple times, proximally dilated small bowel, erythematous  Side to side anti-peristaltic anastomosis, 80mm RUTH blue stapler load  Fascia closed and wound vac placed    Extubated yesterday. NGT to suction, wound vac in place and remains NPO    PAST MEDICAL & SURGICAL HISTORY:  Marijuana use, continuous  Hypertension  Diabetes  No pertinent past medical history  S/P tubal ligation  20 years ago    Allergies  No Known Allergies    MEDICATIONS  (STANDING):  cefepime   IVPB 1000 milliGRAM(s) IV Intermittent every 12 hours  chlorhexidine 0.12% Liquid 15 milliLiter(s) Oral Mucosa every 12 hours  chlorhexidine 4% Liquid 1 Application(s) Topical daily  dextrose 50% Injectable 25 Gram(s) IV Push once  heparin   Injectable 5000 Unit(s) SubCutaneous every 8 hours  influenza   Vaccine 0.5 milliLiter(s) IntraMuscular once  insulin lispro (ADMELOG) corrective regimen sliding scale   SubCutaneous three times a day before meals  lactated ringers. 1000 milliLiter(s) (125 mL/Hr) IV Continuous <Continuous>  metoprolol tartrate Injectable 2.5 milliGRAM(s) IV Push every 6 hours  metroNIDAZOLE  IVPB 500 milliGRAM(s) IV Intermittent every 8 hours  pantoprazole  Injectable 40 milliGRAM(s) IV Push daily    ICU Vital Signs Last 24 Hrs  T(C): 37.1 (12 Feb 2021 07:58), Max: 37.8 (11 Feb 2021 20:00)  T(F): 98.7 (12 Feb 2021 07:58), Max: 100 (11 Feb 2021 20:00)  HR: 78 (12 Feb 2021 09:00) (74 - 97)  BP: 103/51 (11 Feb 2021 14:00) (103/51 - 103/51)  BP(mean): 73 (11 Feb 2021 14:00) (73 - 73)  ABP: 133/64 (12 Feb 2021 09:00) (87/57 - 141/70)  ABP(mean): 89 (12 Feb 2021 09:00) (67 - 96)  RR: 16 (12 Feb 2021 09:00) (16 - 17)  SpO2: 99% (12 Feb 2021 09:00) (98% - 100%)    Drug Dosing Weight  Height (cm): 157.5 (10 Feb 2021 23:29)  Weight (kg): 67.6 (10 Feb 2021 23:29)  BMI (kg/m2): 27.3 (10 Feb 2021 23:29)  BSA (m2): 1.69 (10 Feb 2021 23:29)    EXAM     Abd:     LE:   Enteral access:  IV access:    LABS  02-12    141  |  108  |  68<HH>  ----------------------------<  124<H>  3.8   |  16<L>  |  2.2<H>    Ca    7.5<L>      12 Feb 2021 04:30  Phos  5.3     02-11  Mg     2.4     02-11    TPro  4.6<L>  /  Alb  2.8<L>  /  TBili  1.0  /  DBili  x   /  AST  17  /  ALT  15  /  AlkPhos  45  02-11                        12.0   7.55  )-----------( 213      ( 11 Feb 2021 06:35 )             33.4         CAPILLARY BLOOD GLUCOSE  POCT Blood Glucose.: 134 mg/dL (12 Feb 2021 05:38)  POCT Blood Glucose.: 135 mg/dL (11 Feb 2021 23:17)  POCT Blood Glucose.: 128 mg/dL (11 Feb 2021 17:22)  POCT Blood Glucose.: 117 mg/dL (11 Feb 2021 11:33)    Diet, NPO:   With Ice Chips/Sips of Water (02-12-21 @ 10:14)      ASSESSMENT  55 F with PMH HTN, HLD, DM 2, with strangulated ventral hernia s/p 165 cm SBR/Right hemicolectomy with primary anastamosis (2/11)      PLAN  - check baseline TG level  - start TPN tonight  - d/c IVF's once TPN begins  - daily bmp, in phos, mg while on parenteral nutrition  - picc planned NUTRITION SUPPORT CONSULTATION    HPI:  55y female with a past medical hx of HTN, Hyperlipidemia, DM, presents to the ED complaining of increased pain from a chronic umbilical hernia. She states that she first noticed this hernia about 20 years ago, immediately after a tubal ligation procedure, and it has been slowly enlarging since. She states that it always has been uncomfortable, but never this painful. The severe pain is rated 10/10 and is accompanied with nausea and vomiting, of 3 days duration. She states she is not passing gas and that her last bowel movement was 1 week ago.  (10 Feb 2021 22:33)    Found to have strangulated ventral hernia, OR 2/11 s/p 165 cm SBR/Right hemicolectomy with primary anastomosis     · Operative Findings  Frankly necrotic small bowel, 165cm resected  Ischemic ascending colon and cecum, pale, and thrombosed mesentery with weakly palpable pulse  large, complex ventral hernia with multiple defects, small bowel was twisted about its mesentery and herniated  Small bowel ran multiple times, proximally dilated small bowel, erythematous  Side to side anti-peristaltic anastomosis, 80mm RUTH blue stapler load  Fascia closed and wound vac placed    Extubated yesterday. NGT to suction, wound vac in place and remains NPO    PAST MEDICAL & SURGICAL HISTORY:  Marijuana use, continuous  Hypertension  Diabetes  No pertinent past medical history  S/P tubal ligation  20 years ago    Allergies  No Known Allergies    MEDICATIONS  (STANDING):  cefepime   IVPB 1000 milliGRAM(s) IV Intermittent every 12 hours  chlorhexidine 0.12% Liquid 15 milliLiter(s) Oral Mucosa every 12 hours  chlorhexidine 4% Liquid 1 Application(s) Topical daily  dextrose 50% Injectable 25 Gram(s) IV Push once  heparin   Injectable 5000 Unit(s) SubCutaneous every 8 hours  influenza   Vaccine 0.5 milliLiter(s) IntraMuscular once  insulin lispro (ADMELOG) corrective regimen sliding scale   SubCutaneous three times a day before meals  lactated ringers. 1000 milliLiter(s) (125 mL/Hr) IV Continuous <Continuous>  metoprolol tartrate Injectable 2.5 milliGRAM(s) IV Push every 6 hours  metroNIDAZOLE  IVPB 500 milliGRAM(s) IV Intermittent every 8 hours  pantoprazole  Injectable 40 milliGRAM(s) IV Push daily    ICU Vital Signs Last 24 Hrs  T(C): 37.1 (12 Feb 2021 07:58), Max: 37.8 (11 Feb 2021 20:00)  T(F): 98.7 (12 Feb 2021 07:58), Max: 100 (11 Feb 2021 20:00)  HR: 78 (12 Feb 2021 09:00) (74 - 97)  BP: 103/51 (11 Feb 2021 14:00) (103/51 - 103/51)  BP(mean): 73 (11 Feb 2021 14:00) (73 - 73)  ABP: 133/64 (12 Feb 2021 09:00) (87/57 - 141/70)  ABP(mean): 89 (12 Feb 2021 09:00) (67 - 96)  RR: 16 (12 Feb 2021 09:00) (16 - 17)  SpO2: 99% (12 Feb 2021 09:00) (98% - 100%)    Drug Dosing Weight  Height (cm): 157.5 (10 Feb 2021 23:29)  Weight (kg): 67.6 (10 Feb 2021 23:29)  BMI (kg/m2): 27.3 (10 Feb 2021 23:29)  BSA (m2): 1.69 (10 Feb 2021 23:29)    EXAM  A&O X 3. NAD  Abd: soft, wound vac in place  Enteral access: NGT to suction  IV access: Rt IJ TLC, dressing c/d/i    LABS  02-12    141  |  108  |  68<HH>  ----------------------------<  124<H>  3.8   |  16<L>  |  2.2<H>    Ca    7.5<L>      12 Feb 2021 04:30  Phos  5.3     02-11  Mg     2.4     02-11    TPro  4.6<L>  /  Alb  2.8<L>  /  TBili  1.0  /  DBili  x   /  AST  17  /  ALT  15  /  AlkPhos  45  02-11                        12.0   7.55  )-----------( 213      ( 11 Feb 2021 06:35 )             33.4         CAPILLARY BLOOD GLUCOSE  POCT Blood Glucose.: 134 mg/dL (12 Feb 2021 05:38)  POCT Blood Glucose.: 135 mg/dL (11 Feb 2021 23:17)  POCT Blood Glucose.: 128 mg/dL (11 Feb 2021 17:22)  POCT Blood Glucose.: 117 mg/dL (11 Feb 2021 11:33)    Diet, NPO:   With Ice Chips/Sips of Water (02-12-21 @ 10:14)    ASSESSMENT  55 F with PMH HTN, HLD, DM 2, with strangulated ventral hernia s/p 165 cm SBR/Right hemicolectomy with primary anastamosis (2/11)    PLAN  - check baseline TG level  - start TPN tonight  - d/c IVF's once TPN begins  - daily bmp, in phos, mg while on parenteral nutrition  - picc planned

## 2021-02-12 NOTE — CHART NOTE - NSCHARTNOTEFT_GEN_A_CORE
SICU Transfer Note:    Transfer from: SICU  Transfer to:  ( x ) Surgery    SICU COURSE:  55y Female with PMHx of HTN, HLD, DM 2, who presented with abdominal pain from a chronic umbilical hernia that she has had for 20 years since her tubal ligation surgery. Pain associated with nausea/vomiting for 3 days. CT abdomen/pelvis done in the ED which showed ventral hernia containing distal small bowel loops, the appendix, and the right colon from the cecum to the level of the transverse colon. There was proximal small bowel distension consistent with SBO, and reduced bowel wall enhancement consistent with ischemia. Lactate was 4.7. Patient was taken emergently to the OR where 160 cm of ischemic terminal ileum and ascending colon were resected, with primary anastomosis.  Fascia closed and wound vac placed. Per report, she was hypotensive intraop requiring pushes of neosynephrine. Patient remained intubated post operatively.     Postoperatively patient was tachycardic, bolused 500cc which subsequently resolved. Cardiac enzymes were positive but have since been downtrending. Patient passed SBT in the afternoon on POD:1 and was extubated with no complications. Patient's NGT remains to low continuous suction and has put out 1.3L/24hrs. While NPO patient will continue to receive IV fluids LR@125. Urine output has been 80-100cc/hour with resolving DERRICK (68/2.2). Patient will continue Cefepime and flagyl at this time. Patient's pain has been controlled with IV tylenol and has been doing well s/p extubation with no complications. Patient is vitally stable with no complaints.                              PAST MEDICAL & SURGICAL HISTORY:  Marijuana use, continuous  Hypertension  Diabetes  No pertinent past medical history  S/P tubal ligation x 20 years ago    Allergies: No Known Allergies    MEDICATIONS  (STANDING):  cefepime   IVPB 1000 milliGRAM(s) IV Intermittent every 12 hours  chlorhexidine 4% Liquid 1 Application(s) Topical daily  dextrose 40% Gel 15 Gram(s) Oral once  dextrose 5%. 1000 milliLiter(s) (50 mL/Hr) IV Continuous <Continuous>  dextrose 5%. 1000 milliLiter(s) (100 mL/Hr) IV Continuous <Continuous>  dextrose 50% Injectable 25 Gram(s) IV Push once  glucagon  Injectable 1 milliGRAM(s) IntraMuscular once  heparin   Injectable 5000 Unit(s) SubCutaneous every 8 hours  influenza   Vaccine 0.5 milliLiter(s) IntraMuscular once  insulin lispro (ADMELOG) corrective regimen sliding scale   SubCutaneous at bedtime  insulin lispro (ADMELOG) corrective regimen sliding scale   SubCutaneous three times a day before meals  lactated ringers. 1000 milliLiter(s) (125 mL/Hr) IV Continuous <Continuous>  metoprolol tartrate Injectable 2.5 milliGRAM(s) IV Push every 6 hours  metroNIDAZOLE  IVPB 500 milliGRAM(s) IV Intermittent every 8 hours  pantoprazole  Injectable 40 milliGRAM(s) IV Push daily    MEDICATIONS  (PRN):    Vital Signs Last 24 Hrs  T(C): 37.1 (12 Feb 2021 07:58), Max: 37.8 (11 Feb 2021 20:00)  T(F): 98.7 (12 Feb 2021 07:58), Max: 100 (11 Feb 2021 20:00)  HR: 78 (12 Feb 2021 09:00) (74 - 97)  BP: 103/51 (11 Feb 2021 14:00) (103/51 - 103/51)  BP(mean): 73 (11 Feb 2021 14:00) (73 - 73)  RR: 16 (12 Feb 2021 09:00) (16 - 17)  SpO2: 99% (12 Feb 2021 09:00) (98% - 100%)  I&O's Summary    11 Feb 2021 07:01  -  12 Feb 2021 07:00  --------------------------------------------------------  IN: 4798.3 mL / OUT: 3370 mL / NET: 1428.3 mL    12 Feb 2021 07:01  -  12 Feb 2021 11:32  --------------------------------------------------------  IN: 475 mL / OUT: 405 mL / NET: 70 mL      LABS                              141    |  108    |  68                  Calcium: 7.5   / iCa: x      (02-12 @ 04:30)    ----------------------------<  124       Magnesium: x                                3.8     |  16     |  2.2              Phosphorous: x          ASSESSMENT/PLAN: 55 year old female is s/p ex lap with repair of strangulated ventral hernia, right hemicolectomy with a primary anastomosis and wound vac in place.     Neuro: A&O x3, resting comfortably  Pain control: IV Tylenol prn    Pulm:   Extubated (2/11) - on RA, sat 99%  AM CXR    Cardio:   Hx of HTN - decreased Metoprolol Tartrate to 2.5mg q 6h   Trops-0.08-->0.10 > 0.08, likely 2/2 demand (intraop hypotension requiring multiple pushes of Neosynephrine)  Post op EKG NSR, QTc 492, (2/11) qtc 454  ECHO (02/11) EF 63%, G1DD, mod RVE, mild TR/MR    GI:   s/p Right hemicolectomy with primary anastamosis, SBR 165cm  Diet: NPO with ice chips  NGT to low continuous suction - monitor output  GI ppx: PPI   Bowel reg:  holding   Abdominal vac in place    :   IVF:  LR @ 125cc/hr  Tavarez - monitor UO, 8-200cc/hr  DERRICK (baseline Cr unknown; no known renal hx)   BUN/Cr- (max 3.6) >>60/2.7 > 70/2.3  Monitor & replete elytes prn    Heme:   DVT propylaxis-heparin   Injectable  Hgb downtrending, likely diluted after IVF resuscitaiton - Hgb 12 > 9  -continue to monitor    ID:   Leukocytosis- 7.5 > 11  Afebrile  Antibiotics-cefepime   IVPB 2000 every 12 hours  metroNIDAZOLE  IVPB 500 every 8 hours    Endo:    A1c:  pending   Holding home medications: metformin 1 g BID, lantus 33 U QHS, trulicity .75 mg Qweek  on ISS     Lines/Tubes/Drains:    Midline wound vac   Right IJ central line   Right radial arterial line   Tavarez   PIVs      Dispo: SICU             Disposition:      For Follow-Up: SICU Transfer Note:    Transfer from: SICU  Transfer to:  ( x ) Surgery    SICU COURSE:  55y Female with PMHx of HTN, HLD, DM 2, who presented with abdominal pain from a chronic umbilical hernia that she has had for 20 years since her tubal ligation surgery. Pain associated with nausea/vomiting for 3 days. CT abdomen/pelvis done in the ED which showed ventral hernia containing distal small bowel loops, the appendix, and the right colon from the cecum to the level of the transverse colon. There was proximal small bowel distension consistent with SBO, and reduced bowel wall enhancement consistent with ischemia. Lactate was 4.7. Patient was taken emergently to the OR where 160 cm of ischemic terminal ileum and ascending colon were resected, with primary anastomosis.  Fascia closed and wound vac placed. Per report, she was hypotensive intraop requiring pushes of neosynephrine. Patient remained intubated post operatively.     Postoperatively patient was tachycardic, bolused 500cc which subsequently resolved. Cardiac enzymes were positive but have since been downtrending. Patient passed SBT in the afternoon on POD:1 and was extubated with no complications. Patient's NGT remains to low continuous suction and has put out 1.3L/24hrs. While NPO patient will continue to receive IV fluids LR@125. Urine output has been 80-100cc/hour with resolving DERRICK (68/2.2). Patient will continue Cefepime and flagyl at this time. Patient's pain has been controlled with IV tylenol and has been doing well s/p extubation with no complications. Patient is vitally stable with no complaints.                              PAST MEDICAL & SURGICAL HISTORY:  Marijuana use, continuous  Hypertension  Diabetes  No pertinent past medical history  S/P tubal ligation x 20 years ago    Allergies: No Known Allergies    MEDICATIONS  (STANDING):  cefepime   IVPB 1000 milliGRAM(s) IV Intermittent every 12 hours  chlorhexidine 4% Liquid 1 Application(s) Topical daily  dextrose 40% Gel 15 Gram(s) Oral once  dextrose 5%. 1000 milliLiter(s) (50 mL/Hr) IV Continuous <Continuous>  dextrose 5%. 1000 milliLiter(s) (100 mL/Hr) IV Continuous <Continuous>  dextrose 50% Injectable 25 Gram(s) IV Push once  glucagon  Injectable 1 milliGRAM(s) IntraMuscular once  heparin   Injectable 5000 Unit(s) SubCutaneous every 8 hours  influenza   Vaccine 0.5 milliLiter(s) IntraMuscular once  insulin lispro (ADMELOG) corrective regimen sliding scale   SubCutaneous at bedtime  insulin lispro (ADMELOG) corrective regimen sliding scale   SubCutaneous three times a day before meals  lactated ringers. 1000 milliLiter(s) (125 mL/Hr) IV Continuous <Continuous>  metoprolol tartrate Injectable 2.5 milliGRAM(s) IV Push every 6 hours  metroNIDAZOLE  IVPB 500 milliGRAM(s) IV Intermittent every 8 hours  pantoprazole  Injectable 40 milliGRAM(s) IV Push daily    MEDICATIONS  (PRN):    Vital Signs Last 24 Hrs  T(C): 37.1 (12 Feb 2021 07:58), Max: 37.8 (11 Feb 2021 20:00)  T(F): 98.7 (12 Feb 2021 07:58), Max: 100 (11 Feb 2021 20:00)  HR: 78 (12 Feb 2021 09:00) (74 - 97)  BP: 103/51 (11 Feb 2021 14:00) (103/51 - 103/51)  BP(mean): 73 (11 Feb 2021 14:00) (73 - 73)  RR: 16 (12 Feb 2021 09:00) (16 - 17)  SpO2: 99% (12 Feb 2021 09:00) (98% - 100%)  I&O's Summary    11 Feb 2021 07:01  -  12 Feb 2021 07:00  --------------------------------------------------------  IN: 4798.3 mL / OUT: 3370 mL / NET: 1428.3 mL    12 Feb 2021 07:01  -  12 Feb 2021 11:32  --------------------------------------------------------  IN: 475 mL / OUT: 405 mL / NET: 70 mL      LABS                              141    |  108    |  68                  Calcium: 7.5   / iCa: x      (02-12 @ 04:30)    ----------------------------<  124       Magnesium: x                                3.8     |  16     |  2.2              Phosphorous: x          ASSESSMENT/PLAN: 55 year old female is s/p ex lap with repair of strangulated ventral hernia, right hemicolectomy with a primary anastomosis and wound vac in place.     Neuro: A&O x3, resting comfortably  Pain control: IV Tylenol prn    Pulm:   Extubated (2/11) - on RA, sat 99%  AM CXR    Cardio:   Hx of HTN - decreased Metoprolol Tartrate to 2.5mg q 6h   Trops-0.08-->0.10 > 0.08, likely 2/2 demand (intraop hypotension requiring multiple pushes of Neosynephrine)  Post op EKG NSR, QTc 492, (2/11) qtc 454  ECHO (02/11) EF 63%, G1DD, mod RVE, mild TR/MR    GI:   s/p Right hemicolectomy with primary anastamosis, SBR 165cm  Diet: NPO with ice chips  NGT to low continuous suction - monitor output  GI ppx: PPI   Bowel reg:  holding   Abdominal vac in place    :   IVF:  LR @ 125cc/hr  Tavarez - monitor UO, 80-200cc/hr  DERRICK (baseline Cr unknown; no known renal hx)   BUN/Cr- (max 3.6) >>60/2.7 > 70/2.3  Monitor & replete elytes prn    Heme:   DVT propylaxis-heparin   Injectable  Hgb downtrending, likely diluted after IVF resuscitaiton - Hgb 12 > 9  -continue to monitor    ID:   Leukocytosis- 7.5 > 11  Afebrile  Antibiotics-cefepime   IVPB 2000 every 12 hours  metroNIDAZOLE  IVPB 500 every 8 hours    Endo:    A1c:  pending   Holding home medications: metformin 1 g BID, lantus 33 U QHS, trulicity .75 mg Qweek  on ISS     Lines/Tubes/Drains:    Midline wound vac   Right IJ central line   Right radial arterial line   Tavarez   PIVs      Dispo: Floor    For Follow-Up:  -Trend H&H  -When to advance diet   -Nutrition consult   -PICC placement, will downgrade with TLC until PICC placed if TPN needed    Signed out to SICU Transfer Note:    Transfer from: SICU  Transfer to:  ( x ) Surgery    SICU COURSE:  55y Female with PMHx of HTN, HLD, DM 2, who presented with abdominal pain from a chronic umbilical hernia that she has had for 20 years since her tubal ligation surgery. Pain associated with nausea/vomiting for 3 days. CT abdomen/pelvis done in the ED which showed ventral hernia containing distal small bowel loops, the appendix, and the right colon from the cecum to the level of the transverse colon. There was proximal small bowel distension consistent with SBO, and reduced bowel wall enhancement consistent with ischemia. Lactate was 4.7. Patient was taken emergently to the OR where 160 cm of ischemic terminal ileum and ascending colon were resected, with primary anastomosis.  Fascia closed and wound vac placed. Per report, she was hypotensive intraop requiring pushes of neosynephrine. Patient remained intubated post operatively.     Postoperatively patient was tachycardic, bolused 500cc which subsequently resolved. Cardiac enzymes were positive but have since been downtrending. Patient passed SBT in the afternoon on POD:1 and was extubated with no complications. Patient's NGT remains to low continuous suction and has put out 1.3L/24hrs. While NPO patient will continue to receive IV fluids LR@125. Urine output has been 80-100cc/hour with resolving DERRICK (68/2.2). Patient will continue Cefepime and flagyl at this time. Patient's pain has been controlled with IV tylenol and has been doing well s/p extubation with no complications. Patient is vitally stable with no complaints.                              PAST MEDICAL & SURGICAL HISTORY:  Marijuana use, continuous  Hypertension  Diabetes  No pertinent past medical history  S/P tubal ligation x 20 years ago    Allergies: No Known Allergies    MEDICATIONS  (STANDING):  cefepime   IVPB 1000 milliGRAM(s) IV Intermittent every 12 hours  chlorhexidine 4% Liquid 1 Application(s) Topical daily  dextrose 40% Gel 15 Gram(s) Oral once  dextrose 5%. 1000 milliLiter(s) (50 mL/Hr) IV Continuous <Continuous>  dextrose 5%. 1000 milliLiter(s) (100 mL/Hr) IV Continuous <Continuous>  dextrose 50% Injectable 25 Gram(s) IV Push once  glucagon  Injectable 1 milliGRAM(s) IntraMuscular once  heparin   Injectable 5000 Unit(s) SubCutaneous every 8 hours  influenza   Vaccine 0.5 milliLiter(s) IntraMuscular once  insulin lispro (ADMELOG) corrective regimen sliding scale   SubCutaneous at bedtime  insulin lispro (ADMELOG) corrective regimen sliding scale   SubCutaneous three times a day before meals  lactated ringers. 1000 milliLiter(s) (125 mL/Hr) IV Continuous <Continuous>  metoprolol tartrate Injectable 2.5 milliGRAM(s) IV Push every 6 hours  metroNIDAZOLE  IVPB 500 milliGRAM(s) IV Intermittent every 8 hours  pantoprazole  Injectable 40 milliGRAM(s) IV Push daily    MEDICATIONS  (PRN):    Vital Signs Last 24 Hrs  T(C): 37.1 (12 Feb 2021 07:58), Max: 37.8 (11 Feb 2021 20:00)  T(F): 98.7 (12 Feb 2021 07:58), Max: 100 (11 Feb 2021 20:00)  HR: 78 (12 Feb 2021 09:00) (74 - 97)  BP: 103/51 (11 Feb 2021 14:00) (103/51 - 103/51)  BP(mean): 73 (11 Feb 2021 14:00) (73 - 73)  RR: 16 (12 Feb 2021 09:00) (16 - 17)  SpO2: 99% (12 Feb 2021 09:00) (98% - 100%)  I&O's Summary    11 Feb 2021 07:01  -  12 Feb 2021 07:00  --------------------------------------------------------  IN: 4798.3 mL / OUT: 3370 mL / NET: 1428.3 mL    12 Feb 2021 07:01  -  12 Feb 2021 11:32  --------------------------------------------------------  IN: 475 mL / OUT: 405 mL / NET: 70 mL      LABS                              141    |  108    |  68                  Calcium: 7.5   / iCa: x      (02-12 @ 04:30)    ----------------------------<  124       Magnesium: x                                3.8     |  16     |  2.2              Phosphorous: x          ASSESSMENT/PLAN: 55 year old female is s/p ex lap with repair of strangulated ventral hernia, right hemicolectomy with a primary anastomosis and wound vac in place.     Neuro: A&O x3, resting comfortably  Pain control: IV Tylenol prn    Pulm:   Extubated (2/11) - on RA, sat 99%  AM CXR    Cardio:   Hx of HTN - decreased Metoprolol Tartrate to 2.5mg q 6h   Trops-0.08-->0.10 > 0.08, likely 2/2 demand (intraop hypotension requiring multiple pushes of Neosynephrine)  Post op EKG NSR, QTc 492, (2/11) qtc 454  ECHO (02/11) EF 63%, G1DD, mod RVE, mild TR/MR    GI:   s/p Right hemicolectomy with primary anastamosis, SBR 165cm  Diet: NPO with ice chips  NGT to low continuous suction - monitor output  GI ppx: PPI   Bowel reg:  holding   Abdominal vac in place    :   IVF:  LR @ 125cc/hr  Tavarez - monitor UO, 80-200cc/hr  DERRICK (baseline Cr unknown; no known renal hx)   BUN/Cr- (max 3.6) >>60/2.7 > 70/2.3  Monitor & replete elytes prn    Heme:   DVT propylaxis-heparin   Injectable  Hgb downtrending, likely diluted after IVF resuscitaiton - Hgb 12 > 9  -continue to monitor    ID:   Leukocytosis- 7.5 > 11  Afebrile  Antibiotics-cefepime   IVPB 2000 every 12 hours  metroNIDAZOLE  IVPB 500 every 8 hours    Endo:    A1c:  pending   Holding home medications: metformin 1 g BID, lantus 33 U QHS, trulicity .75 mg Qweek  on ISS     Lines/Tubes/Drains:    Midline wound vac   Right IJ central line   Right radial arterial line   Tavarez   PIVs      Dispo: Floor    For Follow-Up:  -Trend H&H  -When to advance diet   -Nutrition consult   -PICC placement, will downgrade with TLC until PICC placed if TPN needed    Signed out to Dr. Loya at [id number] 12:40PM

## 2021-02-12 NOTE — PROCEDURE NOTE - NSPROCDETAILS_GEN_ALL_CORE
location identified, draped/prepped, sterile technique used/sterile dressing applied/sterile technique, catheter placed/supine position/Trendelenburg position/ultrasound guidance
location identified, draped/prepped, sterile technique used, needle inserted/introduced/positive blood return obtained via catheter/connected to a pressurized flush line/sutured in place/hemostasis with direct pressure, dressing applied/Seldinger technique/all materials/supplies accounted for at end of procedure

## 2021-02-12 NOTE — PROCEDURE NOTE - ADDITIONAL PROCEDURE DETAILS
6 Telugu 41 cm Bard PowerPicc via left brachial vein  Catheter tip in distal SVC  Ready for immediate use

## 2021-02-12 NOTE — CONSULT NOTE ADULT - ASSESSMENT
IMPRESSION: Rehab of  gait disorder due to surgery    PRECAUTIONS: [    ] Cardiac  [    ] Respiratory  [    ] Seizures [    ] Contact Isolation  [    ] Droplet Isolation  [ x   ] Other= wound vac, NGT    Weight Bearing Status: wbat    RECOMMENDATION:    Out of Bed to Chair     DVT/Decubiti Prophylaxis    REHAB PLAN:     [   x  ] Bedside P/T 3-5 times a week   [   x  ] Bedside O/T  2-3 times a week   [     ] No Rehab Therapy Indicated   [     ]  Speech Therapy   Conditioning/ROM                                 ADL  Bed Mobility                                            Conditioning/ROM  Transfers                                                  Bed Mobility  Sitting /Standing Balance                      Transfers                                        Gait Training                                            Sitting/Standing Balance  Stair Training [   ]Applicable                 Home equipment Eval                                                                     Splinting  [   ] Only      GOALS:   ADL   [   x ]   Independent         Transfers  [    ] Independent            Ambulation  [  x   ] Independent     [   x  ] With device                            [ x   ]  CG                                               [ x   ]  CG                                                    [   x  ] CG                            [    ] Min A                                          [    ] Min A                                                [     ] Min  A          DISCHARGE PLAN:   [     ]  Good candidate for Intensive Rehabilitation/Hospital based-[facility]                                             Will tolerate 3hrs Intensive Rehab Daily                                       [      ]  Short Term Rehab in Skilled Nursing Facility                                       [   x   ]  Home with Outpatient or VN services                                         [      ]  Possible Candidate for Intensive Hospital based Rehab

## 2021-02-12 NOTE — PROGRESS NOTE ADULT - ATTENDING COMMENTS
I examined the patient with the PA/resident and discussed my plan with the Resident/PA.  I personally provided over 30 minutes of direct critical care to this patient. I agree with the above resident/pa note unless directly contradicted below.     MELISSA RAJPUT    55 F with PMH HTN, HLD, DM 2, with strangulated ventral hernia s/p 165 cm SBR/Right hemicolectomy with primary anastomosis (2/11)    A&O x3, resting comfortably  post op Pain control: IV Tylenol prn, morphine    Extubated (2/11) - on RA, sat 99%  AM CXR- clear     Hx of HTN - decreased Metoprolol Tartrate to 2.5mg q6h   Trops-0.08-->0.10 > 0.08,  stop trending  Post op EKG NSR, QTc 492, (2/11) qtc 454  ECHO (02/11) EF 63%, G1DD, mod RVE, mild TR/MR    s/p Right hemicolectomy with primary anastomosis, SBR 165cm  Diet:  NPO   NGT to low continuous suction - monitor output 650cc  GI ppx: PPI   abdominal vac in place    IVF:  LR @ 125cc/hr Silver - monitor UO, 80-100cc/hr  BUN/Cr- (max 3.6) >>60/2.7 > 70/2.3 > 68/2.2  hypokalemia- Repleted     hsq  Hgb downtrending, likely diluted after IVF resuscitation - Hgb 12 > 9>> repeat at 11am     Leukocytosis- 7.5 > 11, Afebrile  Antibiotics-cefepime + metroNIDAZOLE      A1c:  pending   Holding home medications: metformin 1 g BID, lantus 33 U QHS, trulicity .75 mg Qweek  on RISS     Lines/Tubes/Drains:    Midline wound vac   Right IJ central line - leave for tpn  Right radial arterial line - remove  Silver - dc silver   PIVs    PT eval/oob     Dispo: Downgrade to floor

## 2021-02-12 NOTE — PROGRESS NOTE ADULT - SUBJECTIVE AND OBJECTIVE BOX
GENERAL SURGERY PROGRESS NOTE     MELISSA CARUSO  55y  Female  Hospital day :2d  POD:  Procedure: Negative pressure wound therapy, greater than 50 sq cm    Open repair of strangulated ventral hernia    Abdominal washout    Right hemicolectomy    Resection of small bowel    Exploratory laparotomy      OVERNIGHT EVENTS: no acute overnight events     T(F): 98.8 (21 @ 00:00), Max: 100 (21 @ 20:00)  HR: 86 (21 @ 02:00) (77 - 106)  BP: 103/51 (21 @ 14:00) (103/51 - 103/51)  ABP: 104/55 (21 @ 02:00) (87/57 - 115/63)  ABP(mean): 73 (21 @ 02:00) (67 - 85)  RR: 16 (21 @ 20:00) (16 - 22)  SpO2: 99% (21 @ 02:00) (98% - 100%)    DIET/FLUIDS: lactated ringers. 1000 milliLiter(s) IV Continuous <Continuous>    N-10-21 @ 07:01  -  21 @ 07:00  --------------------------------------------------------  OUT: 200 mL                                                                                 DRAINS:     BM:     EMESIS:     URINE:   02-10-21 @ 07:01  -  21 @ 07:00  --------------------------------------------------------  OUT: 208 mL     Indwelling Urethral Catheter:     Connect To:  Straight Drainage/Gravity    Indication:  Urine Output Monitoring in Critically Ill (21 @ 01:46)    GI proph:  pantoprazole  Injectable 40 milliGRAM(s) IV Push daily    AC/ proph: heparin   Injectable 5000 Unit(s) SubCutaneous every 8 hours    ABx: cefepime   IVPB 1000 milliGRAM(s) IV Intermittent every 12 hours  metroNIDAZOLE  IVPB 500 milliGRAM(s) IV Intermittent every 8 hours    PHYSICAL EXAM:  GENERAL: NAD,   CHEST/LUNG: Clear to auscultation bilaterally  HEART: Regular rate and rhythm  ABDOMEN: Soft, Nontender, mild distended; VAC in place  EXTREMITIES:  No clubbing, cyanosis, or edema      LABS  Labs:  CAPILLARY BLOOD GLUCOSE      POCT Blood Glucose.: 135 mg/dL (2021 23:17)  POCT Blood Glucose.: 128 mg/dL (2021 17:22)  POCT Blood Glucose.: 117 mg/dL (2021 11:33)  POCT Blood Glucose.: 136 mg/dL (2021 06:58)                          12.0   7.55  )-----------( 213      ( 2021 06:35 )             33.4       Auto Neutrophil %: 80.4 % (21 @ 06:35)  Auto Immature Granulocyte %: 0.7 % (21 @ 06:35)        136  |  106  |  60<H>  ----------------------------<  113<H>  4.2   |  18  |  2.7<H>      Calcium, Total Serum: 6.8 mg/dL (21 @ 11:30)      LFTs:             4.6  | 1.0  | 17       ------------------[45      ( 2021 06:35 )  2.8  | x    | 15          Lipase:x      Amylase:x         Blood Gas Arterial, Lactate: 1.0 mmoL/L (21 @ 19:32)  Blood Gas Arterial, Lactate: 1.0 mmoL/L (21 @ 17:25)  Blood Gas Arterial, Lactate: 1.1 mmoL/L (21 @ 15:06)  Blood Gas Arterial, Lactate: 1.7 mmoL/L (21 @ 04:18)  Blood Gas Arterial, Lactate: 4.3 mmoL/L (21 @ 01:03)  Blood Gas Venous - Lactate: 4.3 mmoL/L (02-10-21 @ 18:59)  Lactate, Blood: 4.7 mmol/L (02-10-21 @ 17:56)    ABG - ( 2021 19:32 )  pH: 7.42  /  pCO2: 26    /  pO2: 71    / HCO3: 17    / Base Excess: -6.8  /  SaO2: 95              ABG - ( 2021 17:25 )  pH: 7.44  /  pCO2: 25    /  pO2: 140   / HCO3: 17    / Base Excess: -6.2  /  SaO2: 98              ABG - ( 2021 15:06 )  pH: 7.41  /  pCO2: 28    /  pO2: 147   / HCO3: 18    / Base Excess: -5.7  /  SaO2: 99                Coags:     13.80  ----< 1.20    ( 2021 06:35 )     26.2        CARDIAC MARKERS ( 2021 23:30 )  x     / 0.08 ng/mL / x     / x     / x      CARDIAC MARKERS ( 2021 11:30 )  x     / 0.10 ng/mL / x     / x     / x      CARDIAC MARKERS ( 2021 06:35 )  x     / 0.08 ng/mL / x     / x     / x      CARDIAC MARKERS ( 2021 03:21 )  x     / 0.08 ng/mL / x     / x     / x                  Culture - Blood (collected 10 Feb 2021 19:45)  Source: .Blood Blood-Peripheral  Preliminary Report (2021 01:02):    No growth to date.          RADIOLOGY & ADDITIONAL TESTS:      A/P

## 2021-02-13 LAB
A1C WITH ESTIMATED AVERAGE GLUCOSE RESULT: 6.2 % — HIGH (ref 4–5.6)
ANION GAP SERPL CALC-SCNC: 10 MMOL/L — SIGNIFICANT CHANGE UP (ref 7–14)
ANION GAP SERPL CALC-SCNC: 12 MMOL/L — SIGNIFICANT CHANGE UP (ref 7–14)
ANION GAP SERPL CALC-SCNC: 14 MMOL/L — SIGNIFICANT CHANGE UP (ref 7–14)
ANION GAP SERPL CALC-SCNC: 15 MMOL/L — HIGH (ref 7–14)
APTT BLD: 33.8 SEC — SIGNIFICANT CHANGE UP (ref 27–39.2)
BUN SERPL-MCNC: 31 MG/DL — HIGH (ref 10–20)
BUN SERPL-MCNC: 33 MG/DL — HIGH (ref 10–20)
BUN SERPL-MCNC: 39 MG/DL — HIGH (ref 10–20)
BUN SERPL-MCNC: 42 MG/DL — HIGH (ref 10–20)
CALCIUM SERPL-MCNC: 7.7 MG/DL — LOW (ref 8.5–10.1)
CALCIUM SERPL-MCNC: 7.9 MG/DL — LOW (ref 8.5–10.1)
CALCIUM SERPL-MCNC: 8.1 MG/DL — LOW (ref 8.5–10.1)
CALCIUM SERPL-MCNC: 8.8 MG/DL — SIGNIFICANT CHANGE UP (ref 8.5–10.1)
CHLORIDE SERPL-SCNC: 100 MMOL/L — SIGNIFICANT CHANGE UP (ref 98–110)
CHLORIDE SERPL-SCNC: 101 MMOL/L — SIGNIFICANT CHANGE UP (ref 98–110)
CHLORIDE SERPL-SCNC: 103 MMOL/L — SIGNIFICANT CHANGE UP (ref 98–110)
CHLORIDE SERPL-SCNC: 105 MMOL/L — SIGNIFICANT CHANGE UP (ref 98–110)
CO2 SERPL-SCNC: 23 MMOL/L — SIGNIFICANT CHANGE UP (ref 17–32)
CO2 SERPL-SCNC: 23 MMOL/L — SIGNIFICANT CHANGE UP (ref 17–32)
CO2 SERPL-SCNC: 26 MMOL/L — SIGNIFICANT CHANGE UP (ref 17–32)
CO2 SERPL-SCNC: 27 MMOL/L — SIGNIFICANT CHANGE UP (ref 17–32)
CREAT SERPL-MCNC: 0.9 MG/DL — SIGNIFICANT CHANGE UP (ref 0.7–1.5)
CREAT SERPL-MCNC: 0.9 MG/DL — SIGNIFICANT CHANGE UP (ref 0.7–1.5)
CREAT SERPL-MCNC: 1.1 MG/DL — SIGNIFICANT CHANGE UP (ref 0.7–1.5)
CREAT SERPL-MCNC: 1.2 MG/DL — SIGNIFICANT CHANGE UP (ref 0.7–1.5)
ESTIMATED AVERAGE GLUCOSE: 131 MG/DL — HIGH (ref 68–114)
GLUCOSE BLDC GLUCOMTR-MCNC: 256 MG/DL — HIGH (ref 70–99)
GLUCOSE BLDC GLUCOMTR-MCNC: 258 MG/DL — HIGH (ref 70–99)
GLUCOSE BLDC GLUCOMTR-MCNC: 293 MG/DL — HIGH (ref 70–99)
GLUCOSE BLDC GLUCOMTR-MCNC: 307 MG/DL — HIGH (ref 70–99)
GLUCOSE SERPL-MCNC: 188 MG/DL — HIGH (ref 70–99)
GLUCOSE SERPL-MCNC: 206 MG/DL — HIGH (ref 70–99)
GLUCOSE SERPL-MCNC: 270 MG/DL — HIGH (ref 70–99)
GLUCOSE SERPL-MCNC: 295 MG/DL — HIGH (ref 70–99)
HCT VFR BLD CALC: 23.3 % — LOW (ref 37–47)
HCT VFR BLD CALC: 24 % — LOW (ref 37–47)
HGB BLD-MCNC: 8.4 G/DL — LOW (ref 12–16)
HGB BLD-MCNC: 8.5 G/DL — LOW (ref 12–16)
INR BLD: 1.1 RATIO — SIGNIFICANT CHANGE UP (ref 0.65–1.3)
MAGNESIUM SERPL-MCNC: 1.9 MG/DL — SIGNIFICANT CHANGE UP (ref 1.8–2.4)
MAGNESIUM SERPL-MCNC: 2.4 MG/DL — SIGNIFICANT CHANGE UP (ref 1.8–2.4)
MCHC RBC-ENTMCNC: 29.9 PG — SIGNIFICANT CHANGE UP (ref 27–31)
MCHC RBC-ENTMCNC: 30.8 PG — SIGNIFICANT CHANGE UP (ref 27–31)
MCHC RBC-ENTMCNC: 35.4 G/DL — SIGNIFICANT CHANGE UP (ref 32–37)
MCHC RBC-ENTMCNC: 36.1 G/DL — SIGNIFICANT CHANGE UP (ref 32–37)
MCV RBC AUTO: 84.5 FL — SIGNIFICANT CHANGE UP (ref 81–99)
MCV RBC AUTO: 85.3 FL — SIGNIFICANT CHANGE UP (ref 81–99)
NRBC # BLD: 0 /100 WBCS — SIGNIFICANT CHANGE UP (ref 0–0)
NRBC # BLD: 0 /100 WBCS — SIGNIFICANT CHANGE UP (ref 0–0)
PHOSPHATE SERPL-MCNC: 0.7 MG/DL — LOW (ref 2.1–4.9)
PHOSPHATE SERPL-MCNC: 1 MG/DL — LOW (ref 2.1–4.9)
PHOSPHATE SERPL-MCNC: 2.1 MG/DL — SIGNIFICANT CHANGE UP (ref 2.1–4.9)
PHOSPHATE SERPL-MCNC: 2.9 MG/DL — SIGNIFICANT CHANGE UP (ref 2.1–4.9)
PLATELET # BLD AUTO: 149 K/UL — SIGNIFICANT CHANGE UP (ref 130–400)
PLATELET # BLD AUTO: 165 K/UL — SIGNIFICANT CHANGE UP (ref 130–400)
POTASSIUM SERPL-MCNC: 2.7 MMOL/L — CRITICAL LOW (ref 3.5–5)
POTASSIUM SERPL-MCNC: 2.7 MMOL/L — CRITICAL LOW (ref 3.5–5)
POTASSIUM SERPL-MCNC: 2.8 MMOL/L — LOW (ref 3.5–5)
POTASSIUM SERPL-MCNC: 3.9 MMOL/L — SIGNIFICANT CHANGE UP (ref 3.5–5)
POTASSIUM SERPL-SCNC: 2.7 MMOL/L — CRITICAL LOW (ref 3.5–5)
POTASSIUM SERPL-SCNC: 2.7 MMOL/L — CRITICAL LOW (ref 3.5–5)
POTASSIUM SERPL-SCNC: 2.8 MMOL/L — LOW (ref 3.5–5)
POTASSIUM SERPL-SCNC: 3.9 MMOL/L — SIGNIFICANT CHANGE UP (ref 3.5–5)
PROTHROM AB SERPL-ACNC: 12.6 SEC — SIGNIFICANT CHANGE UP (ref 9.95–12.87)
RBC # BLD: 2.73 M/UL — LOW (ref 4.2–5.4)
RBC # BLD: 2.84 M/UL — LOW (ref 4.2–5.4)
RBC # FLD: 13.6 % — SIGNIFICANT CHANGE UP (ref 11.5–14.5)
RBC # FLD: 13.6 % — SIGNIFICANT CHANGE UP (ref 11.5–14.5)
SODIUM SERPL-SCNC: 137 MMOL/L — SIGNIFICANT CHANGE UP (ref 135–146)
SODIUM SERPL-SCNC: 139 MMOL/L — SIGNIFICANT CHANGE UP (ref 135–146)
SODIUM SERPL-SCNC: 141 MMOL/L — SIGNIFICANT CHANGE UP (ref 135–146)
SODIUM SERPL-SCNC: 142 MMOL/L — SIGNIFICANT CHANGE UP (ref 135–146)
TRIGL SERPL-MCNC: 216 MG/DL — HIGH
WBC # BLD: 16.77 K/UL — HIGH (ref 4.8–10.8)
WBC # BLD: 17.46 K/UL — HIGH (ref 4.8–10.8)
WBC # FLD AUTO: 16.77 K/UL — HIGH (ref 4.8–10.8)
WBC # FLD AUTO: 17.46 K/UL — HIGH (ref 4.8–10.8)

## 2021-02-13 PROCEDURE — 71045 X-RAY EXAM CHEST 1 VIEW: CPT | Mod: 26

## 2021-02-13 PROCEDURE — 93010 ELECTROCARDIOGRAM REPORT: CPT

## 2021-02-13 PROCEDURE — 99291 CRITICAL CARE FIRST HOUR: CPT

## 2021-02-13 RX ORDER — MAGNESIUM SULFATE 500 MG/ML
1 VIAL (ML) INJECTION EVERY 4 HOURS
Refills: 0 | Status: COMPLETED | OUTPATIENT
Start: 2021-02-13 | End: 2021-02-13

## 2021-02-13 RX ORDER — INSULIN LISPRO 100/ML
VIAL (ML) SUBCUTANEOUS EVERY 6 HOURS
Refills: 0 | Status: DISCONTINUED | OUTPATIENT
Start: 2021-02-13 | End: 2021-02-13

## 2021-02-13 RX ORDER — ELECTROLYTE SOLUTION,INJ
1 VIAL (ML) INTRAVENOUS
Refills: 0 | Status: DISCONTINUED | OUTPATIENT
Start: 2021-02-13 | End: 2021-02-13

## 2021-02-13 RX ORDER — POTASSIUM CHLORIDE 20 MEQ
20 PACKET (EA) ORAL ONCE
Refills: 0 | Status: DISCONTINUED | OUTPATIENT
Start: 2021-02-13 | End: 2021-02-13

## 2021-02-13 RX ORDER — POTASSIUM CHLORIDE 20 MEQ
20 PACKET (EA) ORAL
Refills: 0 | Status: COMPLETED | OUTPATIENT
Start: 2021-02-13 | End: 2021-02-13

## 2021-02-13 RX ORDER — PREGABALIN 225 MG/1
1000 CAPSULE ORAL DAILY
Refills: 0 | Status: DISCONTINUED | OUTPATIENT
Start: 2021-02-13 | End: 2021-02-13

## 2021-02-13 RX ORDER — INSULIN LISPRO 100/ML
2 VIAL (ML) SUBCUTANEOUS ONCE
Refills: 0 | Status: COMPLETED | OUTPATIENT
Start: 2021-02-13 | End: 2021-02-13

## 2021-02-13 RX ORDER — SODIUM CHLORIDE 9 MG/ML
1000 INJECTION, SOLUTION INTRAVENOUS
Refills: 0 | Status: DISCONTINUED | OUTPATIENT
Start: 2021-02-13 | End: 2021-02-13

## 2021-02-13 RX ORDER — INSULIN LISPRO 100/ML
VIAL (ML) SUBCUTANEOUS EVERY 4 HOURS
Refills: 0 | Status: DISCONTINUED | OUTPATIENT
Start: 2021-02-13 | End: 2021-02-14

## 2021-02-13 RX ORDER — SODIUM CHLORIDE 9 MG/ML
1000 INJECTION INTRAMUSCULAR; INTRAVENOUS; SUBCUTANEOUS
Refills: 0 | Status: DISCONTINUED | OUTPATIENT
Start: 2021-02-13 | End: 2021-02-13

## 2021-02-13 RX ORDER — POTASSIUM CHLORIDE 20 MEQ
20 PACKET (EA) ORAL ONCE
Refills: 0 | Status: COMPLETED | OUTPATIENT
Start: 2021-02-13 | End: 2021-02-13

## 2021-02-13 RX ORDER — THIAMINE MONONITRATE (VIT B1) 100 MG
500 TABLET ORAL EVERY 8 HOURS
Refills: 0 | Status: DISCONTINUED | OUTPATIENT
Start: 2021-02-13 | End: 2021-02-13

## 2021-02-13 RX ORDER — MAGNESIUM SULFATE 500 MG/ML
1 VIAL (ML) INJECTION ONCE
Refills: 0 | Status: COMPLETED | OUTPATIENT
Start: 2021-02-13 | End: 2021-02-13

## 2021-02-13 RX ORDER — ACETAMINOPHEN 500 MG
1000 TABLET ORAL ONCE
Refills: 0 | Status: COMPLETED | OUTPATIENT
Start: 2021-02-13 | End: 2021-02-13

## 2021-02-13 RX ORDER — POTASSIUM CHLORIDE 20 MEQ
20 PACKET (EA) ORAL
Refills: 0 | Status: DISCONTINUED | OUTPATIENT
Start: 2021-02-13 | End: 2021-02-13

## 2021-02-13 RX ORDER — MORPHINE SULFATE 50 MG/1
2 CAPSULE, EXTENDED RELEASE ORAL EVERY 6 HOURS
Refills: 0 | Status: DISCONTINUED | OUTPATIENT
Start: 2021-02-13 | End: 2021-02-16

## 2021-02-13 RX ORDER — POTASSIUM PHOSPHATE, MONOBASIC POTASSIUM PHOSPHATE, DIBASIC 236; 224 MG/ML; MG/ML
30 INJECTION, SOLUTION INTRAVENOUS ONCE
Refills: 0 | Status: COMPLETED | OUTPATIENT
Start: 2021-02-13 | End: 2021-02-13

## 2021-02-13 RX ADMIN — CEFEPIME 100 MILLIGRAM(S): 1 INJECTION, POWDER, FOR SOLUTION INTRAMUSCULAR; INTRAVENOUS at 16:00

## 2021-02-13 RX ADMIN — Medication 100 MILLIEQUIVALENT(S): at 21:27

## 2021-02-13 RX ADMIN — HEPARIN SODIUM 5000 UNIT(S): 5000 INJECTION INTRAVENOUS; SUBCUTANEOUS at 12:13

## 2021-02-13 RX ADMIN — Medication 2.5 MILLIGRAM(S): at 17:47

## 2021-02-13 RX ADMIN — MORPHINE SULFATE 2 MILLIGRAM(S): 50 CAPSULE, EXTENDED RELEASE ORAL at 15:59

## 2021-02-13 RX ADMIN — SODIUM CHLORIDE 125 MILLILITER(S): 9 INJECTION, SOLUTION INTRAVENOUS at 21:28

## 2021-02-13 RX ADMIN — HEPARIN SODIUM 5000 UNIT(S): 5000 INJECTION INTRAVENOUS; SUBCUTANEOUS at 21:27

## 2021-02-13 RX ADMIN — Medication 2 UNIT(S): at 17:46

## 2021-02-13 RX ADMIN — HEPARIN SODIUM 5000 UNIT(S): 5000 INJECTION INTRAVENOUS; SUBCUTANEOUS at 05:40

## 2021-02-13 RX ADMIN — MORPHINE SULFATE 2 MILLIGRAM(S): 50 CAPSULE, EXTENDED RELEASE ORAL at 09:58

## 2021-02-13 RX ADMIN — POTASSIUM PHOSPHATE, MONOBASIC POTASSIUM PHOSPHATE, DIBASIC 125 MILLIMOLE(S): 236; 224 INJECTION, SOLUTION INTRAVENOUS at 21:26

## 2021-02-13 RX ADMIN — Medication 400 MILLIGRAM(S): at 05:52

## 2021-02-13 RX ADMIN — Medication 100 MILLIGRAM(S): at 16:38

## 2021-02-13 RX ADMIN — Medication 2.5 MILLIGRAM(S): at 13:15

## 2021-02-13 RX ADMIN — Medication 9: at 16:37

## 2021-02-13 RX ADMIN — PANTOPRAZOLE SODIUM 40 MILLIGRAM(S): 20 TABLET, DELAYED RELEASE ORAL at 11:24

## 2021-02-13 RX ADMIN — Medication 2.5 MILLIGRAM(S): at 05:40

## 2021-02-13 RX ADMIN — Medication 6: at 12:12

## 2021-02-13 RX ADMIN — CEFEPIME 100 MILLIGRAM(S): 1 INJECTION, POWDER, FOR SOLUTION INTRAMUSCULAR; INTRAVENOUS at 05:40

## 2021-02-13 RX ADMIN — Medication 6: at 07:51

## 2021-02-13 RX ADMIN — Medication 100 MILLIEQUIVALENT(S): at 11:09

## 2021-02-13 RX ADMIN — Medication 50 MILLIEQUIVALENT(S): at 07:34

## 2021-02-13 RX ADMIN — Medication 50 GRAM(S): at 17:03

## 2021-02-13 RX ADMIN — Medication 100 GRAM(S): at 12:16

## 2021-02-13 RX ADMIN — Medication 50 GRAM(S): at 17:46

## 2021-02-13 RX ADMIN — Medication 100 MILLIGRAM(S): at 09:55

## 2021-02-13 RX ADMIN — Medication 100 MILLIEQUIVALENT(S): at 23:40

## 2021-02-13 NOTE — CONSULT NOTE ADULT - ASSESSMENT
Assessment & Plan  55F w/ PMH of HTN, HLD, and DM 2 admitted for strangulated ventral hernia s/p small bowel resection (165cm) and right hemicolectomy with primary anastomosis.     NEURO:  pain: morphine PRN    RESP:  extubated 2/11, currently saturating well on RA  activity: ambulate as tolerated, pt was OOBTC today  AM CXR      CARDS:   PMH of HTN on metoprolol at home  - currently on metoprolol 2.5mg Q6H  post-operatively trops were elevated to 0.1, attributed to demand ischemia, no longer trending  last EKG 2/11 PM: NSR w/ occasional PVCs?, QTc 454 -- FU stat EKG, AM EKG  ECHO 2/11: EF 63%, G1DD, moderate RVE, mild TR/MR    GI/NUTR:   strangulated ventral hernia s/p small bowel resection (165cm) and right hemicolectomy with primary anastomosis  - continue NGT to LCWS  - wound vac in place with serosanguinous output (last changed **)  diet: NPO w/ ice chips  GI PPX: PTX  bowel regimen: holding    /RENAL:   strict I/Os: primafit in place  UOP: 1200cc/24H  BUN/Cr- 39/1.1  -->,  33/0.9  -->,  31/0.9  -->  Electrolytes-Na 137 // K 2.7 // Mg 2.4 //  Phos 0.7 02-13 @ 18:15  Na 139 // K 3.9 // Mg 1.9 //  Phos 1.0 02-13 @ 16:21    HEME/ONC:   DVT PPX: HSQ  Hb8.4 > 8.5    ID:  antibiotics: cefepime 1000mg Q12H, flagyl 500mg Q8H  WBC: 14 > 16 > 17  afebrile    ENDO:  PMH of DM on metformin, lantus 33U QHS, and trulicity 75 mg Qweek at home  - currently not on ISS --> contributing to hypokalemia?  - Q4H FS  permissive hyperglycemia to 200s-300s  - will give insulin as needed  HbA1c 6.2 (2/13)    LINES/DRAINS: PIV, L PICC  DISPO: SICU Assessment & Plan  55F w/ PMH of HTN, HLD, and DM 2 admitted for strangulated ventral hernia s/p small bowel resection (165cm) and right hemicolectomy with primary anastomosis. SICU consulted for management of severe electrolyte disturbances.     NEURO:  pain: morphine PRN    RESP:  extubated 2/11, currently saturating well on RA  activity: ambulate as tolerated, pt was OOBTC today  AM CXR    CARDS:   PMH of HTN on metoprolol at home  - currently on metoprolol 2.5mg Q6H  post-operatively trops were elevated to 0.1, attributed to demand ischemia, no longer trending  last EKG 2/11 PM: NSR w/ occasional PVCs?, QTc 454 -- FU stat EKG, AM EKG  ECHO 2/11: EF 63%, G1DD, moderate RVE, mild TR/MR    GI/NUTR:  strangulated ventral hernia s/p small bowel resection (165cm) and right hemicolectomy with primary anastomosis  - continue NGT to LCWS  - wound vac in place with serosanguinous output (last changed **)  diet: NPO w/ ice chips, TPN  GI PPX: PTX  bowel regimen: holding    /RENAL:   strict I/Os: primafit in place  UOP: 1200cc/24H  BUN/Cr- 39/1.1  -->,  33/0.9  -->,  31/0.9  -->  Electrolyte derangements: hypokalemia, hypophosphoremia, hyperglycemia  - 2/12 23:30 - K 2.8, Mg 1.9, Phos 2.9  - 2/13 05:41 - K 2.7, Mg 1.9, Phos 2.1  - 07:34 - 20 KCl x 2 = 40 mEq K  - 07:51 - , 6U lispro  - 11:00 20 K = 20 mEq K  - 12:15 - 1g Mg,  --> 6U lispro  - 16:37 -  --> 9U lispro  - 17:56 - 2U lispro, 2g Mg  - 18:15 - K 2.7, Mg 2.4, Phos 0.7  - 21:26 - 30 KPhos, 20 K x 2 = 45 + 40mEq = 85 mEq K    HEME/ONC:   DVT PPX: HSQ  Hb 8.4 > 8.5    ID:  antibiotics: cefepime 1000mg Q12H, flagyl 500mg Q8H  WBC: 14 > 16 > 17  afebrile    ENDO:  PMH of DM on metformin, lantus 33U QHS, and trulicity 75 mg Qweek at home  - currently not on ISS --> contributing to hypokalemia?  - Q4H FS  permissive hyperglycemia to 200s-300s  - will give insulin as needed  HbA1c 6.2 (2/13)    LINES/DRAINS: PIV, L PICC  DISPO: SICU

## 2021-02-13 NOTE — CHART NOTE - NSCHARTNOTEFT_GEN_A_CORE
Received call from surgical resident re: electrolytes, gluc  TPN started last night. Formula adjusted for tonight based on AM labs  Gluc has been elevated but insulin coverage was d/c'd earlier today  Phos on 2/11 was 5.2, this am 2.1. K+ 2.7 in Am, KCl and Kphos given and increased in tonight's TPN  TPN was d/c'd and IVF's ordered, d/w surgical resident  s/p PICC placement    T(F): 97.5 (02-13-21 @ 21:14), Max: 98.6 (02-13-21 @ 04:57)  HR: 87 (02-13-21 @ 21:14) (69 - 87)  BP: 166/83 (02-13-21 @ 21:14) (142/68 - 166/83)  RR: 18 (02-13-21 @ 21:14) (16 - 18)    I&O's Detail    12 Feb 2021 07:01  -  13 Feb 2021 07:00  --------------------------------------------------------  IN:    IV PiggyBack: 100 mL    Lactated Ringers: 2000 mL    sodium chloride 0.9%: 35 mL    TPN (Total Parenteral Nutrition): 585 mL  Total IN: 2720 mL    OUT:    Indwelling Catheter - Urethral (mL): 730 mL    Nasogastric/Oral tube (mL): 1175 mL    VAC (Vacuum Assisted Closure) System (mL): 50 mL    Voided (mL): 1750 mL  Total OUT: 3705 mL    Total NET: -985 mL    13 Feb 2021 07:01  -  13 Feb 2021 22:27  --------------------------------------------------------  IN:    IV PiggyBack: 50 mL    IV PiggyBack: 200 mL    IV PiggyBack: 300 mL    sodium chloride 0.9%: 385 mL    TPN (Total Parenteral Nutrition): 715 mL  Total IN: 1650 mL    OUT:    Nasogastric/Oral tube (mL): 600 mL    VAC (Vacuum Assisted Closure) System (mL): 50 mL    Voided (mL): 1200 mL  Total OUT: 1850 mL    Total NET: -200 mL    02-13    137  |  100  |  31<H>  ----------------------------<  295<H>  2.7<LL>   |  27  |  0.9    Ca    7.7<L>      13 Feb 2021 18:15  Phos  0.7     02-13  Mg     2.4     02-13  Triglycerides, Serum: 216 mg/dL (02.13.21 @ 05:41)                        8.5    17.46 )-----------( 149      ( 13 Feb 2021 16:21 )             24.0     CAPILLARY BLOOD GLUCOSE  POCT Blood Glucose.: 293 mg/dL (13 Feb 2021 21:53)  POCT Blood Glucose.: 307 mg/dL (13 Feb 2021 16:33)  POCT Blood Glucose.: 256 mg/dL (13 Feb 2021 12:09)  POCT Blood Glucose.: 258 mg/dL (13 Feb 2021 07:42)    Diet, NPO:   With Ice Chips/Sips of Water (02-12-21 @ 10:14)    ASSESSMENT  55 F with PMH HTN, HLD, DM 2, with strangulated ventral hernia s/p 165 cm SBR/Right hemicolectomy with primary anastamosis (2/11)    PLAN  - resume TPN as ordered (10ml MVI (includes B!2), 100mg thiamine, 1mg folic acid, 1g Mg, 10U insulin in TPN bag  - d/c IVF's as ordered, TPN provides all of the additives in these fluids as well as protein, insulin, etc.   - repeat bmp, in phos, mg after Kphos rider completed then in the AM  - resume insulin coverage was d/c'd earlier today until able to manage gluc with insulin in tpn

## 2021-02-13 NOTE — PHYSICAL THERAPY INITIAL EVALUATION ADULT - GENERAL OBSERVATIONS, REHAB EVAL
Pt was seen from 10:00-10:25 for PT IE. Pt was rec'd sitting up in b/s chair, +IV, +NG tube, +wound vac, +prima fit, agreeable to participate in PT.

## 2021-02-13 NOTE — PHYSICAL THERAPY INITIAL EVALUATION ADULT - MD ORDER
Negative pressure wound therapy, greater than 50 sq cm, Open repair of strangulated ventral hernia, Abdominal washout, Right hemicolectomy, resection of small bowel, Exploratory laparotomy

## 2021-02-13 NOTE — PROGRESS NOTE ADULT - ASSESSMENT
55 years old female admitted due to incarcerated umbilical hernia, S/P Exploratory laparotomy, small bowel resection, right hemicolectomy. Downgraded from ICU. Doing well, NGT in place.     Plan  - Continue NPO, TPN  - will remove right IJ central line  - Monitor Vital signs  - Pain control as needed  - NGT insertion and F/U output  - CBC, BMP, and electrolytes, replete as needed  - GI and DVT prophylaxis  - F/U SICU recommendation  - Continue current management

## 2021-02-13 NOTE — PHYSICAL THERAPY INITIAL EVALUATION ADULT - PERTINENT HX OF CURRENT PROBLEM, REHAB EVAL
55y female with a past medical hx of HTN, Hyperlipidemia, DM, presented to the ED 2/10/2021 complaining of increased pain from a chronic umbilical hernia.

## 2021-02-13 NOTE — OCCUPATIONAL THERAPY INITIAL EVALUATION ADULT - PERTINENT HX OF CURRENT PROBLEM, REHAB EVAL
55y female with a past medical hx of HTN, Hyperlipidemia, DM, presents to the ED complaining of increased pain from a chronic umbilical hernia. She states that she first noticed this hernia about 20 years ago, immediately after a tubal ligation procedure, and it has been slowly enlarging since. She states that it always has been uncomfortable, but never this painful. The severe pain is rated 10/10 and is accompanied with nausea and vomiting, of 3 days duration.

## 2021-02-13 NOTE — OCCUPATIONAL THERAPY INITIAL EVALUATION ADULT - GENERAL OBSERVATIONS, REHAB EVAL
Pt seen from 8:05-8:45 and encountered in NAD except 10/10 pain in abdomen. Pt with + NG tube, 2 IV lines, Abdominal Wound Vac, Pules Oxi  monitor, + sequentials, + Prima Fit

## 2021-02-13 NOTE — CONSULT NOTE ADULT - CONSULT REASON
refeeding syndrome i/s/o extensive small bowel resection electrolyte derangements i/s/o extensive small bowel resection

## 2021-02-13 NOTE — PROGRESS NOTE ADULT - SUBJECTIVE AND OBJECTIVE BOX
GENERAL SURGERY PROGRESS NOTE     MELISSA CARUSO  55y  Female  Hospital day :3d    OVERNIGHT EVENTS: no acute events overnight     T(F): 98.4 (21 @ 19:58), Max: 98.7 (21 @ 07:58)  HR: 76 (21 @ 19:58) (73 - 94)  BP: 142/65 (21 @ 19:58) (106/80 - 148/68)  ABP: 140/68 (21 @ 11:00) (96/51 - 141/70)  ABP(mean): 95 (21 @ 11:00) (68 - 96)  RR: 16 (21 @ 19:58) (16 - 17)  SpO2: 99% (21 @ 14:00) (97% - 100%)    DIET/FLUIDS: dextrose 5%. 1000 milliLiter(s) IV Continuous <Continuous>  dextrose 5%. 1000 milliLiter(s) IV Continuous <Continuous>  lactated ringers. 1000 milliLiter(s) IV Continuous <Continuous>  Parenteral Nutrition - Adult 1 Each TPN Continuous <Continuous>    N21 @ 07:01  -  21 @ 07:00  --------------------------------------------------------  OUT: 1350 mL    URINE:   21 @ 07:01  -  21 @ 07:00  --------------------------------------------------------  OUT: 2020 mL       GI proph:  pantoprazole  Injectable 40 milliGRAM(s) IV Push daily    AC/ proph: heparin   Injectable 5000 Unit(s) SubCutaneous every 8 hours    ABx: cefepime   IVPB 1000 milliGRAM(s) IV Intermittent every 12 hours  metroNIDAZOLE  IVPB 500 milliGRAM(s) IV Intermittent every 8 hours    PHYSICAL EXAM:  GENERAL: NAD,   CHEST/LUNG: Clear to auscultation bilaterally  HEART: Regular rate and rhythm  ABDOMEN: Soft, Nontender, mild distended; VAC in place  EXTREMITIES:  No clubbing, cyanosis, or edema      LABS  CAPILLARY BLOOD GLUCOSE  POCT Blood Glucose.: 133 mg/dL (2021 22:04)  POCT Blood Glucose.: 150 mg/dL (2021 17:40)  POCT Blood Glucose.: 132 mg/dL (2021 11:00)  POCT Blood Glucose.: 134 mg/dL (2021 05:38)                          8.8    14.53 )-----------( 165      ( 2021 11:00 )             24.5             141  |  108  |  68<HH>  ----------------------------<  124<H>  3.8   |  16<L>  |  2.2<H>      Calcium, Total Serum: 7.5 mg/dL (21 @ 04:30)      LFTs:             4.6  | 1.0  | 17       ------------------[45      ( 2021 06:35 )  2.8  | x    | 15          Lipase:x      Amylase:x         Blood Gas Arterial, Lactate: 1.0 mmoL/L (21 @ 19:32)  Blood Gas Arterial, Lactate: 1.0 mmoL/L (21 @ 17:25)  Blood Gas Arterial, Lactate: 1.1 mmoL/L (21 @ 15:06)  Blood Gas Arterial, Lactate: 1.7 mmoL/L (21 @ 04:18)  Blood Gas Arterial, Lactate: 4.3 mmoL/L (21 @ 01:03)  Blood Gas Venous - Lactate: 4.3 mmoL/L (02-10-21 @ 18:59)  Lactate, Blood: 4.7 mmol/L (02-10-21 @ 17:56)    ABG - ( 2021 19:32 )  pH: 7.42  /  pCO2: 26    /  pO2: 71    / HCO3: 17    / Base Excess: -6.8  /  SaO2: 95        ABG - ( 2021 17:25 )  pH: 7.44  /  pCO2: 25    /  pO2: 140   / HCO3: 17    / Base Excess: -6.2  /  SaO2: 98        ABG - ( 2021 15:06 )  pH: 7.41  /  pCO2: 28    /  pO2: 147   / HCO3: 18    / Base Excess: -5.7  /  SaO2: 99        Coags:     13.80  ----< 1.20    ( 2021 06:35 )     26.2      CARDIAC MARKERS ( 2021 23:30 )  x     / 0.08 ng/mL / x     / x     / x      CARDIAC MARKERS ( 2021 11:30 )  x     / 0.10 ng/mL / x     / x     / x      CARDIAC MARKERS ( 2021 06:35 )  x     / 0.08 ng/mL / x     / x     / x      CARDIAC MARKERS ( 2021 03:21 )  x     / 0.08 ng/mL / x     / x     / x        Culture - Blood (collected 10 Feb 2021 19:45)  Source: .Blood Blood-Peripheral  Preliminary Report (2021 01:02):    No growth to date.

## 2021-02-13 NOTE — PHYSICAL THERAPY INITIAL EVALUATION ADULT - GAIT TRAINING, PT EVAL
Goal: By discharge: Ambulation: 50 feet with Rolling walker with CGA. Stairs: 10 with 1-2 HR with Min assist

## 2021-02-14 LAB
ANION GAP SERPL CALC-SCNC: 10 MMOL/L — SIGNIFICANT CHANGE UP (ref 7–14)
ANION GAP SERPL CALC-SCNC: 6 MMOL/L — LOW (ref 7–14)
ANION GAP SERPL CALC-SCNC: 8 MMOL/L — SIGNIFICANT CHANGE UP (ref 7–14)
BUN SERPL-MCNC: 20 MG/DL — SIGNIFICANT CHANGE UP (ref 10–20)
BUN SERPL-MCNC: 25 MG/DL — HIGH (ref 10–20)
BUN SERPL-MCNC: 26 MG/DL — HIGH (ref 10–20)
CALCIUM SERPL-MCNC: 7.5 MG/DL — LOW (ref 8.5–10.1)
CALCIUM SERPL-MCNC: 7.6 MG/DL — LOW (ref 8.5–10.1)
CALCIUM SERPL-MCNC: 7.8 MG/DL — LOW (ref 8.5–10.1)
CHLORIDE SERPL-SCNC: 96 MMOL/L — LOW (ref 98–110)
CHLORIDE SERPL-SCNC: 98 MMOL/L — SIGNIFICANT CHANGE UP (ref 98–110)
CHLORIDE SERPL-SCNC: 99 MMOL/L — SIGNIFICANT CHANGE UP (ref 98–110)
CO2 SERPL-SCNC: 30 MMOL/L — SIGNIFICANT CHANGE UP (ref 17–32)
CO2 SERPL-SCNC: 31 MMOL/L — SIGNIFICANT CHANGE UP (ref 17–32)
CO2 SERPL-SCNC: 31 MMOL/L — SIGNIFICANT CHANGE UP (ref 17–32)
CREAT SERPL-MCNC: 0.7 MG/DL — SIGNIFICANT CHANGE UP (ref 0.7–1.5)
CREAT SERPL-MCNC: 0.7 MG/DL — SIGNIFICANT CHANGE UP (ref 0.7–1.5)
CREAT SERPL-MCNC: 0.8 MG/DL — SIGNIFICANT CHANGE UP (ref 0.7–1.5)
GLUCOSE BLDC GLUCOMTR-MCNC: 199 MG/DL — HIGH (ref 70–99)
GLUCOSE BLDC GLUCOMTR-MCNC: 218 MG/DL — HIGH (ref 70–99)
GLUCOSE BLDC GLUCOMTR-MCNC: 218 MG/DL — HIGH (ref 70–99)
GLUCOSE BLDC GLUCOMTR-MCNC: 243 MG/DL — HIGH (ref 70–99)
GLUCOSE BLDC GLUCOMTR-MCNC: 257 MG/DL — HIGH (ref 70–99)
GLUCOSE BLDC GLUCOMTR-MCNC: 263 MG/DL — HIGH (ref 70–99)
GLUCOSE BLDC GLUCOMTR-MCNC: 267 MG/DL — HIGH (ref 70–99)
GLUCOSE BLDC GLUCOMTR-MCNC: 269 MG/DL — HIGH (ref 70–99)
GLUCOSE BLDC GLUCOMTR-MCNC: 277 MG/DL — HIGH (ref 70–99)
GLUCOSE SERPL-MCNC: 254 MG/DL — HIGH (ref 70–99)
GLUCOSE SERPL-MCNC: 267 MG/DL — HIGH (ref 70–99)
GLUCOSE SERPL-MCNC: 272 MG/DL — HIGH (ref 70–99)
HCT VFR BLD CALC: 26 % — LOW (ref 37–47)
HGB BLD-MCNC: 9.2 G/DL — LOW (ref 12–16)
MAGNESIUM SERPL-MCNC: 1.8 MG/DL — SIGNIFICANT CHANGE UP (ref 1.8–2.4)
MAGNESIUM SERPL-MCNC: 1.9 MG/DL — SIGNIFICANT CHANGE UP (ref 1.8–2.4)
MAGNESIUM SERPL-MCNC: 2.4 MG/DL — SIGNIFICANT CHANGE UP (ref 1.8–2.4)
MCHC RBC-ENTMCNC: 30.6 PG — SIGNIFICANT CHANGE UP (ref 27–31)
MCHC RBC-ENTMCNC: 35.4 G/DL — SIGNIFICANT CHANGE UP (ref 32–37)
MCV RBC AUTO: 86.4 FL — SIGNIFICANT CHANGE UP (ref 81–99)
NRBC # BLD: 0 /100 WBCS — SIGNIFICANT CHANGE UP (ref 0–0)
PHOSPHATE SERPL-MCNC: 1.6 MG/DL — LOW (ref 2.1–4.9)
PHOSPHATE SERPL-MCNC: 1.6 MG/DL — LOW (ref 2.1–4.9)
PHOSPHATE SERPL-MCNC: 2.9 MG/DL — SIGNIFICANT CHANGE UP (ref 2.1–4.9)
PLATELET # BLD AUTO: 224 K/UL — SIGNIFICANT CHANGE UP (ref 130–400)
POTASSIUM SERPL-MCNC: 3.3 MMOL/L — LOW (ref 3.5–5)
POTASSIUM SERPL-MCNC: 3.5 MMOL/L — SIGNIFICANT CHANGE UP (ref 3.5–5)
POTASSIUM SERPL-MCNC: 3.9 MMOL/L — SIGNIFICANT CHANGE UP (ref 3.5–5)
POTASSIUM SERPL-SCNC: 3.3 MMOL/L — LOW (ref 3.5–5)
POTASSIUM SERPL-SCNC: 3.5 MMOL/L — SIGNIFICANT CHANGE UP (ref 3.5–5)
POTASSIUM SERPL-SCNC: 3.9 MMOL/L — SIGNIFICANT CHANGE UP (ref 3.5–5)
RBC # BLD: 3.01 M/UL — LOW (ref 4.2–5.4)
RBC # FLD: 13.9 % — SIGNIFICANT CHANGE UP (ref 11.5–14.5)
SODIUM SERPL-SCNC: 134 MMOL/L — LOW (ref 135–146)
SODIUM SERPL-SCNC: 135 MMOL/L — SIGNIFICANT CHANGE UP (ref 135–146)
SODIUM SERPL-SCNC: 140 MMOL/L — SIGNIFICANT CHANGE UP (ref 135–146)
TRIGL SERPL-MCNC: 184 MG/DL — HIGH
WBC # BLD: 12.18 K/UL — HIGH (ref 4.8–10.8)
WBC # FLD AUTO: 12.18 K/UL — HIGH (ref 4.8–10.8)

## 2021-02-14 PROCEDURE — 99291 CRITICAL CARE FIRST HOUR: CPT

## 2021-02-14 PROCEDURE — 71045 X-RAY EXAM CHEST 1 VIEW: CPT | Mod: 26

## 2021-02-14 RX ORDER — LABETALOL HCL 100 MG
10 TABLET ORAL EVERY 4 HOURS
Refills: 0 | Status: DISCONTINUED | OUTPATIENT
Start: 2021-02-14 | End: 2021-02-16

## 2021-02-14 RX ORDER — INSULIN HUMAN 100 [IU]/ML
2 INJECTION, SOLUTION SUBCUTANEOUS
Qty: 100 | Refills: 0 | Status: DISCONTINUED | OUTPATIENT
Start: 2021-02-14 | End: 2021-02-16

## 2021-02-14 RX ORDER — POTASSIUM PHOSPHATE, MONOBASIC POTASSIUM PHOSPHATE, DIBASIC 236; 224 MG/ML; MG/ML
30 INJECTION, SOLUTION INTRAVENOUS ONCE
Refills: 0 | Status: COMPLETED | OUTPATIENT
Start: 2021-02-14 | End: 2021-02-14

## 2021-02-14 RX ORDER — I.V. FAT EMULSION 20 G/100ML
1.48 EMULSION INTRAVENOUS
Qty: 100.05 | Refills: 0 | Status: DISCONTINUED | OUTPATIENT
Start: 2021-02-14 | End: 2021-02-14

## 2021-02-14 RX ORDER — INSULIN LISPRO 100/ML
3 VIAL (ML) SUBCUTANEOUS ONCE
Refills: 0 | Status: COMPLETED | OUTPATIENT
Start: 2021-02-14 | End: 2021-02-14

## 2021-02-14 RX ORDER — ELECTROLYTE SOLUTION,INJ
1 VIAL (ML) INTRAVENOUS
Refills: 0 | Status: DISCONTINUED | OUTPATIENT
Start: 2021-02-14 | End: 2021-02-14

## 2021-02-14 RX ORDER — MAGNESIUM SULFATE 500 MG/ML
2 VIAL (ML) INJECTION ONCE
Refills: 0 | Status: COMPLETED | OUTPATIENT
Start: 2021-02-14 | End: 2021-02-14

## 2021-02-14 RX ORDER — POTASSIUM CHLORIDE 20 MEQ
20 PACKET (EA) ORAL ONCE
Refills: 0 | Status: COMPLETED | OUTPATIENT
Start: 2021-02-14 | End: 2021-02-14

## 2021-02-14 RX ORDER — INSULIN LISPRO 100/ML
VIAL (ML) SUBCUTANEOUS EVERY 4 HOURS
Refills: 0 | Status: DISCONTINUED | OUTPATIENT
Start: 2021-02-14 | End: 2021-02-14

## 2021-02-14 RX ORDER — MORPHINE SULFATE 50 MG/1
2 CAPSULE, EXTENDED RELEASE ORAL ONCE
Refills: 0 | Status: DISCONTINUED | OUTPATIENT
Start: 2021-02-14 | End: 2021-02-14

## 2021-02-14 RX ORDER — POTASSIUM PHOSPHATE, MONOBASIC POTASSIUM PHOSPHATE, DIBASIC 236; 224 MG/ML; MG/ML
15 INJECTION, SOLUTION INTRAVENOUS ONCE
Refills: 0 | Status: COMPLETED | OUTPATIENT
Start: 2021-02-14 | End: 2021-02-14

## 2021-02-14 RX ADMIN — Medication 25 GRAM(S): at 18:39

## 2021-02-14 RX ADMIN — Medication 7: at 06:04

## 2021-02-14 RX ADMIN — Medication 100 MILLIGRAM(S): at 16:20

## 2021-02-14 RX ADMIN — Medication 100 MILLIEQUIVALENT(S): at 05:55

## 2021-02-14 RX ADMIN — HEPARIN SODIUM 5000 UNIT(S): 5000 INJECTION INTRAVENOUS; SUBCUTANEOUS at 13:33

## 2021-02-14 RX ADMIN — CEFEPIME 100 MILLIGRAM(S): 1 INJECTION, POWDER, FOR SOLUTION INTRAMUSCULAR; INTRAVENOUS at 06:09

## 2021-02-14 RX ADMIN — Medication 7: at 13:38

## 2021-02-14 RX ADMIN — Medication 2.5 MILLIGRAM(S): at 07:57

## 2021-02-14 RX ADMIN — Medication 9: at 18:17

## 2021-02-14 RX ADMIN — PANTOPRAZOLE SODIUM 40 MILLIGRAM(S): 20 TABLET, DELAYED RELEASE ORAL at 10:31

## 2021-02-14 RX ADMIN — HEPARIN SODIUM 5000 UNIT(S): 5000 INJECTION INTRAVENOUS; SUBCUTANEOUS at 06:09

## 2021-02-14 RX ADMIN — HEPARIN SODIUM 5000 UNIT(S): 5000 INJECTION INTRAVENOUS; SUBCUTANEOUS at 22:11

## 2021-02-14 RX ADMIN — Medication 100 MILLIGRAM(S): at 08:19

## 2021-02-14 RX ADMIN — INSULIN HUMAN 2 UNIT(S)/HR: 100 INJECTION, SOLUTION SUBCUTANEOUS at 20:11

## 2021-02-14 RX ADMIN — Medication 7: at 02:14

## 2021-02-14 RX ADMIN — POTASSIUM PHOSPHATE, MONOBASIC POTASSIUM PHOSPHATE, DIBASIC 62.5 MILLIMOLE(S): 236; 224 INJECTION, SOLUTION INTRAVENOUS at 18:39

## 2021-02-14 RX ADMIN — Medication 1 EACH: at 21:03

## 2021-02-14 RX ADMIN — MORPHINE SULFATE 2 MILLIGRAM(S): 50 CAPSULE, EXTENDED RELEASE ORAL at 21:14

## 2021-02-14 RX ADMIN — CHLORHEXIDINE GLUCONATE 1 APPLICATION(S): 213 SOLUTION TOPICAL at 10:32

## 2021-02-14 RX ADMIN — MORPHINE SULFATE 2 MILLIGRAM(S): 50 CAPSULE, EXTENDED RELEASE ORAL at 01:32

## 2021-02-14 RX ADMIN — I.V. FAT EMULSION 31.3 GM/KG/DAY: 20 EMULSION INTRAVENOUS at 22:04

## 2021-02-14 RX ADMIN — Medication 100 MILLIEQUIVALENT(S): at 01:49

## 2021-02-14 RX ADMIN — POTASSIUM PHOSPHATE, MONOBASIC POTASSIUM PHOSPHATE, DIBASIC 83.33 MILLIMOLE(S): 236; 224 INJECTION, SOLUTION INTRAVENOUS at 07:02

## 2021-02-14 RX ADMIN — MORPHINE SULFATE 2 MILLIGRAM(S): 50 CAPSULE, EXTENDED RELEASE ORAL at 16:20

## 2021-02-14 RX ADMIN — Medication 100 MILLIGRAM(S): at 03:19

## 2021-02-14 RX ADMIN — Medication 3 UNIT(S): at 14:38

## 2021-02-14 RX ADMIN — MORPHINE SULFATE 2 MILLIGRAM(S): 50 CAPSULE, EXTENDED RELEASE ORAL at 08:08

## 2021-02-14 RX ADMIN — Medication 50 GRAM(S): at 03:20

## 2021-02-14 RX ADMIN — Medication 10 MILLIGRAM(S): at 22:11

## 2021-02-14 RX ADMIN — Medication 10 MILLIGRAM(S): at 13:31

## 2021-02-14 RX ADMIN — Medication 7: at 10:31

## 2021-02-14 RX ADMIN — Medication 2.5 MILLIGRAM(S): at 02:35

## 2021-02-14 RX ADMIN — CEFEPIME 100 MILLIGRAM(S): 1 INJECTION, POWDER, FOR SOLUTION INTRAMUSCULAR; INTRAVENOUS at 18:12

## 2021-02-14 NOTE — PROGRESS NOTE ADULT - ATTENDING COMMENTS
55F w/ PMH of HTN, HLD, and DM 2 admitted for strangulated ventral hernia s/p small bowel resection (165cm) and right hemicolectomy with primary anastomosis. SICU consulted for management of severe electrolyte disturbances.     strangulated hernia s/p SBR, hernia repair- NPO/NGT per primary kvng     severe electrolyte derangements, hypophosphatemia 0.7> 1.6, hypomagnesia 1.9> 2.3, hypokalemia 2.7>3.5    tpn @ 80, aggressively replete electrolytes, allow some hyperglycemia- continue , nutritionist aware, downgrade once electrolytes normal       dispo- SICU management until electrolytes normal 55F w/ PMH of HTN, HLD, and DM 2 admitted for strangulated ventral hernia s/p small bowel resection (165cm) and right hemicolectomy with primary anastomosis. SICU consulted for management of severe electrolyte disturbances.     strangulated hernia s/p SBR, hernia repair- NPO/NGT per primary kvng     severe electrolyte derangements, hypophosphatemia 0.7> 1.6, hypomagnesia 1.9> 2.3, hypokalemia 2.7>3.5    tpn @ 80, MV + thiamine, aggressively replete electrolytes, allow some hyperglycemia- continue , nutritionist aware and made tpn adjustments , trend cmp, downgrade once electrolytes normal       dispo- SICU management until electrolytes normal

## 2021-02-14 NOTE — PROGRESS NOTE ADULT - ASSESSMENT
Assessment & Plan  55F w/ PMH of HTN, HLD, and DM 2 admitted for strangulated ventral hernia s/p small bowel resection (165cm) and right hemicolectomy with primary anastomosis. SICU consulted for management of severe electrolyte disturbances.     NEURO:  pain: morphine PRN    RESP:  extubated 2/11, currently saturating well on RA  activity: ambulate as tolerated, pt was OOBTC today  AM CXR    CARDS:   PMH of HTN on metoprolol at home  - currently on metoprolol 2.5mg Q6H  post-operatively trops were elevated to 0.1, attributed to demand ischemia, no longer trending  last EKG 2/11 PM: NSR w/ occasional PVCs?, QTc 454 -- FU stat EKG, AM EKG  ECHO 2/11: EF 63%, G1DD, moderate RVE, mild TR/MR    GI/NUTR:  strangulated ventral hernia s/p small bowel resection (165cm) and right hemicolectomy with primary anastomosis  - continue NGT to LCWS  - wound vac in place with serosanguinous output (last changed **)  diet: NPO w/ ice chips, TPN  GI PPX: PTX  bowel regimen: holding    /RENAL:   strict I/Os: primafit in place  UOP: 1200cc/24H  BUN/Cr- 39/1.1  -->,  33/0.9  -->,  31/0.9  -->  Electrolyte derangements: hypokalemia, hypophosphoremia, hyperglycemia  - 2/12 23:30 - K 2.8, Mg 1.9, Phos 2.9  - 2/13 05:41 - K 2.7, Mg 1.9, Phos 2.1  - 07:34 - 20 KCl x 2 = 40 mEq K  - 07:51 - , 6U lispro  - 11:00 20 K = 20 mEq K  - 12:15 - 1g Mg,  --> 6U lispro  - 16:37 -  --> 9U lispro  - 17:56 - 2U lispro, 2g Mg  - 18:15 - K 2.7, Mg 2.4, Phos 0.7  - 21:26 - 30 KPhos, 20 K x 2 = 45 + 40mEq = 85 mEq K  - 2/14 00:49 Na 134, K 3.3, Mg 1.9, Phos 1.6  - 01:49 20 K = 20 mEq    HEME/ONC:   DVT PPX: HSQ  Hb 8.4 > 8.5    ID:  antibiotics: cefepime 1000mg Q12H, flagyl 500mg Q8H  WBC: 14 > 16 > 17  afebrile    ENDO:  PMH of DM on metformin, lantus 33U QHS, and trulicity 75 mg Qweek at home  - currently not on ISS --> contributing to hypokalemia?  - Q4H FS  permissive hyperglycemia to 200s-300s  - will give insulin as needed  HbA1c 6.2 (2/13)    LINES/DRAINS: PIV, L PICC  DISPO: SICU Assessment & Plan  55F w/ PMH of HTN, HLD, and DM 2 admitted for strangulated ventral hernia s/p small bowel resection (165cm) and right hemicolectomy with primary anastomosis. SICU consulted for management of severe electrolyte disturbances.     NEURO:  pain: morphine PRN  NO neurologic deficits    RESP:  extubated 2/11, currently saturating well on RA  activity: ambulate as tolerated, pt was OOBTC today  Incentive spirometry  AM CXR    CARDS:   PMH of HTN on metoprolol at home  - currently on metoprolol 2.5mg Q6H  post-operatively trops were elevated to 0.1, attributed to demand ischemia, no longer trending  last EKG 2/13 pm NSR LVH,,   ECHO 2/11: EF 63%, G1DD, moderate RVE, mild TR/MR    GI/NUTR:  strangulated ventral hernia s/p small bowel resection (165cm) and right hemicolectomy with primary anastomosis  - continue NGT to LCWS  - wound vac in place with serosanguinous output (last changed **)  diet: NPO w/ ice chips, TPN  GI PPX: PTX  bowel regimen: holding    /RENAL:   strict I/Os: primafit in place  UOP: 1200cc/24H  BUN/Cr- 25/0.7  Electrolyte derangements: hypokalemia, hypophosphoremia, hyperglycemia  - 2/12 23:30 - K 2.8, Mg 1.9, Phos 2.9  - 2/13 05:41 - K 2.7, Mg 1.9, Phos 2.1  - 07:34 - 20 KCl x 2 = 40 mEq K  - 07:51 - , 6U lispro  - 11:00 20 K = 20 mEq K  - 12:15 - 1g Mg,  --> 6U lispro  - 16:37 -  --> 9U lispro  - 17:56 - 2U lispro, 2g Mg  - 18:15 - K 2.7, Mg 2.4, Phos 0.7  - 21:26 - 30 KPhos, 20 K x 2 = 45 + 40mEq = 85 mEq K  - 2/14 00:49 Na 134, K 3.3, Mg 1.9, Phos 1.6  - 01:49 20 K = 20 mEq  -2/15 0630 Na 135  K 3.5  Mag 2.4  Phos 1.6- riders still in progress    HEME/ONC:   DVT PPX: HSQ  Hb 8.4 > 8.5    ID:  antibiotics: cefepime 1000mg Q12H, flagyl 500mg Q8H  WBC: 14 > 16 > 17  afebrile    ENDO:  PMH of DM on metformin, lantus 33U QHS, and trulicity 75 mg Qweek at home  - ISS --> Insulin in TPN 10 units  - Q4H FS  permissive hyperglycemia to 200s-300s  - will give insulin as needed  HbA1c 6.2 (2/13)    LINES/DRAINS: PIV, L PICC  DISPO: SICU

## 2021-02-14 NOTE — PROGRESS NOTE ADULT - SUBJECTIVE AND OBJECTIVE BOX
GENERAL SURGERY PROGRESS NOTE     MELISSA CARUSO  55y  Female  Hospital day :4d  POD:  Procedure: Negative pressure wound therapy, greater than 50 sq cm    Open repair of strangulated ventral hernia    Abdominal washout    Right hemicolectomy    Resection of small bowel    Exploratory laparotomy      EVENTS IN THE LAST 24 HRS: Patient stable, with severe electrolytes imbalances, hyperglycemia, SICU consulted, patient upgraded      T(F): 97.5 (21 @ 21:14), Max: 98.6 (21 @ 04:57)  HR: 87 (21 @ 21:14) (69 - 87)  BP: 166/83 (21 @ 21:14) (142/68 - 166/83)  RR: 18 (21 @ 21:14) (16 - 18)      PHYSICAL EXAM:  GENERAL: NAD,   CHEST/LUNG: Clear to auscultation bilaterally  HEART: Regular rate and rhythm  ABDOMEN: Soft, Nontender, Nondistended; VAC in place  EXTREMITIES:  No clubbing, cyanosis, or edema      DIET/FLUIDS: potassium chloride  20 mEq/100 mL IVPB 20 milliEquivalent(s) IV Intermittent every 1 hour    N21 @ 07:01  -  21 @ 07:00  --------------------------------------------------------  OUT: 1175 mL                                                                                 DRAINS:   21 @ 07:01  -  21 @ 07:00  --------------------------------------------------------  OUT: 50 mL      BM:     EMESIS:     URINE:   21 @ 07:01  -  21 @ 07:00  --------------------------------------------------------  OUT: 730 mL       GI proph:  pantoprazole  Injectable 40 milliGRAM(s) IV Push daily    AC/ proph: heparin   Injectable 5000 Unit(s) SubCutaneous every 8 hours    ABx: cefepime   IVPB 1000 milliGRAM(s) IV Intermittent every 12 hours  metroNIDAZOLE  IVPB 500 milliGRAM(s) IV Intermittent every 8 hours          LABS  Labs:  CAPILLARY BLOOD GLUCOSE      POCT Blood Glucose.: 293 mg/dL (2021 21:53)  POCT Blood Glucose.: 307 mg/dL (2021 16:33)  POCT Blood Glucose.: 256 mg/dL (2021 12:09)  POCT Blood Glucose.: 258 mg/dL (2021 07:42)                          8.5    17.46 )-----------( 149      ( 2021 16:21 )             24.0             137  |  100  |  31<H>  ----------------------------<  295<H>  2.7<LL>   |  27  |  0.9      Calcium, Total Serum: 7.7 mg/dL (21 @ 18:15)      LFTs:     Blood Gas Arterial, Lactate: 1.0 mmoL/L (21 @ 19:32)  Blood Gas Arterial, Lactate: 1.0 mmoL/L (21 @ 17:25)  Blood Gas Arterial, Lactate: 1.1 mmoL/L (21 @ 15:06)  Blood Gas Arterial, Lactate: 1.7 mmoL/L (21 @ 04:18)  Blood Gas Arterial, Lactate: 4.3 mmoL/L (21 @ 01:03)    ABG - ( 2021 19:32 )  pH: 7.42  /  pCO2: 26    /  pO2: 71    / HCO3: 17    / Base Excess: -6.8  /  SaO2: 95              ABG - ( 2021 17:25 )  pH: 7.44  /  pCO2: 25    /  pO2: 140   / HCO3: 17    / Base Excess: -6.2  /  SaO2: 98              ABG - ( 2021 15:06 )  pH: 7.41  /  pCO2: 28    /  pO2: 147   / HCO3: 18    / Base Excess: -5.7  /  SaO2: 99                Coags:     12.60  ----< 1.10    ( 2021 23:30 )     33.8

## 2021-02-14 NOTE — PROGRESS NOTE ADULT - ASSESSMENT
55 years old female admitted due to incarcerated umbilical hernia, S/P Exploratory laparotomy, small bowel resection, right hemicolectomy. Patient was in the floor, started with electrolytes imbalances and hyperglycemia. Pt upgraded to SICU due to concern of refeeding syndrome    Plan  - Continue NPO,   - IJ central line removed  - Monitor Vital signs  - Pain control as needed  - NGT  F/U output  - CBC, BMP, and electrolytes, replete as needed  - GI and DVT prophylaxis  - F/U SICU recommendation  - Continue current management

## 2021-02-14 NOTE — PROGRESS NOTE ADULT - SUBJECTIVE AND OBJECTIVE BOX
MELISSA CARUSO  [id number]  55y Female    Indication for ICU admission: electrolyte derangements i/s/o extensive small bowel resection  Admit Date: 2/10/21  ICU Date: 2/11/21, 2/13/21  OR Date: 2/11/21    Allergies:   No Known Allergies    PAST MEDICAL & SURGICAL HISTORY:  Marijuana use, continuous  Hypertension  Diabetes  No pertinent past medical history  S/P tubal ligation 20 years ago    Home Medications:  ATENOLOL  100 MG TABS:  (11 Feb 2021 02:18)  ATORVASTATIN 40MG TABLETS: TAKE 1 TABLET BY MOUTH EVERY NIGHT AT BEDTIME (11 Feb 2021 02:18)  predniSONE: orally once a day (11 Feb 2021 02:18)  Robaxin 500 mg oral tablet: orally 2 times a day (11 Feb 2021 02:18)    24HRS EVENT:  2/13 - NIGHT  -reconsulted for severe electrolyte derangements -- hypokalemia, hypophosphoremia, hyperglycemia after TPN  -K 3.3, Mg 3.3, Phos 1.6  -30 KPhos, 20 K, 2g Mg      DVT PTX: HSQ  GI PTX: pantoprazole  Injectable 40 milliGRAM(s) IV Push daily    REVIEW OF SYSTEMS  [X] A ten-point review of systems was otherwise negative except as noted.  [ ] Due to altered mental status/intubation, subjective information were not able to be obtained from the patient. History was obtained, to the extent possible, from review of the chart and collateral sources of information. MELISSA CARUSO  [id number]  55y Female    Indication for ICU admission: electrolyte derangements i/s/o extensive small bowel resection  Admit Date: 2/10/21  ICU Date: 2/11/21, 2/13/21  OR Date: 2/11/21    Allergies:   No Known Allergies    PAST MEDICAL & SURGICAL HISTORY:  Marijuana use, continuous  Hypertension  Diabetes  No pertinent past medical history  S/P tubal ligation 20 years ago    Home Medications:  ATENOLOL  100 MG TABS:  (11 Feb 2021 02:18)  ATORVASTATIN 40MG TABLETS: TAKE 1 TABLET BY MOUTH EVERY NIGHT AT BEDTIME (11 Feb 2021 02:18)  predniSONE: orally once a day (11 Feb 2021 02:18)  Robaxin 500 mg oral tablet: orally 2 times a day (11 Feb 2021 02:18)    24HRS EVENT:  2/13 - NIGHT  -reconsulted for severe electrolyte derangements -- hypokalemia, hypophosphoremia, hyperglycemia after TPN  -K 3.3, Mg 3.3, Phos 1.6  -30 KPhos, 20 K, 2g Mg      DVT PTX: HSQ  GI PTX: pantoprazole  Injectable 40 milliGRAM(s) IV Push daily    REVIEW OF SYSTEMS  [X] A ten-point review of systems was otherwise negative except as noted.  [ ] Due to altered mental status/intubation, subjective information were not able to be obtained from the patient. History was obtained, to the extent possible, from review of the chart and collateral sources of information.      Daily     Diet, NPO:   With Ice Chips/Sips of Water (02-12-21 @ 10:14)      CURRENT MEDS:  Neurologic Medications  morphine  - Injectable 2 milliGRAM(s) IV Push every 6 hours PRN Moderate Pain (4 - 6)      Cardiovascular Medications  metoprolol tartrate Injectable 2.5 milliGRAM(s) IV Push every 6 hours    Gastrointestinal Medications  pantoprazole  Injectable 40 milliGRAM(s) IV Push daily    Hematologic/Oncologic Medications  heparin   Injectable 5000 Unit(s) SubCutaneous every 8 hours  influenza   Vaccine 0.5 milliLiter(s) IntraMuscular once    Antimicrobial/Immunologic Medications  cefepime   IVPB 1000 milliGRAM(s) IV Intermittent every 12 hours  metroNIDAZOLE  IVPB 500 milliGRAM(s) IV Intermittent every 8 hours    Endocrine/Metabolic Medications  glucagon  Injectable 1 milliGRAM(s) IntraMuscular once  insulin lispro (ADMELOG) corrective regimen sliding scale   SubCutaneous every 4 hours    Topical/Other Medications  chlorhexidine 4% Liquid 1 Application(s) Topical daily      ICU Vital Signs Last 24 Hrs  T(C): 36.7 (14 Feb 2021 06:00), Max: 36.8 (14 Feb 2021 01:15)  T(F): 98.1 (14 Feb 2021 06:00), Max: 98.3 (14 Feb 2021 01:15)  HR: 66 (14 Feb 2021 06:00) (64 - 87)  BP: 168/87 (14 Feb 2021 06:00) (153/74 - 193/89)  BP(mean): 119 (14 Feb 2021 06:00) (96 - 128)  RR: 14 (14 Feb 2021 06:00) (14 - 22)  SpO2: 98% (14 Feb 2021 06:00) (98% - 99%)            I&O's Summary    13 Feb 2021 07:01  -  14 Feb 2021 07:00  --------------------------------------------------------  IN: 2780 mL / OUT: 2850 mL / NET: -70 mL      I&O's Detail    13 Feb 2021 07:01  -  14 Feb 2021 07:00  --------------------------------------------------------  IN:    IV PiggyBack: 300 mL    IV PiggyBack: 100 mL    IV PiggyBack: 250 mL    IV PiggyBack: 550 mL    sodium chloride 0.9%: 385 mL    TPN (Total Parenteral Nutrition): 1195 mL  Total IN: 2780 mL    OUT:    Nasogastric/Oral tube (mL): 650 mL    VAC (Vacuum Assisted Closure) System (mL): 100 mL    Voided (mL): 2100 mL  Total OUT: 2850 mL    Total NET: -70 mL          PHYSICAL EXAM:    General/Neuro  Awake, alert oriented.x4  MORALES, floows commands  GCS 15    Lungs:      clear to auscultation, Normal expansion/effort.   No wheezes, rales or  rhonchi    Cardiovascular : S1, S2.  Regular rate and rhythm.  Peripheral edema- not significant  Cardiac Rhythm: Normal Sinus Rhythm    GI: Abdomen soft, Tender at site of VAC sponge  - by report has a large cavity under sponge  Nasogastric tube in place.    Wound: sponge working well draining serosanguinous fluid    Extremities: Extremities warm, pink, well-perfused.    Calves soft    Derm: Good skin turgor, no skin breakdown.      " Prima fit in place, pt is voiding well      CXR:     LABS:  CAPILLARY BLOOD GLUCOSE      POCT Blood Glucose.: 263 mg/dL (14 Feb 2021 05:39)  POCT Blood Glucose.: 269 mg/dL (14 Feb 2021 01:57)  POCT Blood Glucose.: 293 mg/dL (13 Feb 2021 21:53)  POCT Blood Glucose.: 307 mg/dL (13 Feb 2021 16:33)  POCT Blood Glucose.: 256 mg/dL (13 Feb 2021 12:09)  POCT Blood Glucose.: 258 mg/dL (13 Feb 2021 07:42)                          8.5    17.46 )-----------( 149      ( 13 Feb 2021 16:21 )             24.0       02-14    135  |  96<L>  |  25<H>  ----------------------------<  267<H>  3.5   |  31  |  0.7    Ca    7.6<L>      14 Feb 2021 06:27  Phos  1.6     02-14  Mg     2.4     02-14        PT/INR - ( 12 Feb 2021 23:30 )   PT: 12.60 sec;   INR: 1.10 ratio         PTT - ( 12 Feb 2021 23:30 )  PTT:33.8 sec

## 2021-02-15 LAB
ANION GAP SERPL CALC-SCNC: 12 MMOL/L — SIGNIFICANT CHANGE UP (ref 7–14)
ANION GAP SERPL CALC-SCNC: 9 MMOL/L — SIGNIFICANT CHANGE UP (ref 7–14)
ANION GAP SERPL CALC-SCNC: 9 MMOL/L — SIGNIFICANT CHANGE UP (ref 7–14)
BLD GP AB SCN SERPL QL: SIGNIFICANT CHANGE UP
BUN SERPL-MCNC: 21 MG/DL — HIGH (ref 10–20)
BUN SERPL-MCNC: 21 MG/DL — HIGH (ref 10–20)
BUN SERPL-MCNC: 22 MG/DL — HIGH (ref 10–20)
CALCIUM SERPL-MCNC: 6.9 MG/DL — LOW (ref 8.5–10.1)
CALCIUM SERPL-MCNC: 7.4 MG/DL — LOW (ref 8.5–10.1)
CALCIUM SERPL-MCNC: 7.5 MG/DL — LOW (ref 8.5–10.1)
CHLORIDE SERPL-SCNC: 97 MMOL/L — LOW (ref 98–110)
CHLORIDE SERPL-SCNC: 98 MMOL/L — SIGNIFICANT CHANGE UP (ref 98–110)
CHLORIDE SERPL-SCNC: 99 MMOL/L — SIGNIFICANT CHANGE UP (ref 98–110)
CO2 SERPL-SCNC: 28 MMOL/L — SIGNIFICANT CHANGE UP (ref 17–32)
CO2 SERPL-SCNC: 30 MMOL/L — SIGNIFICANT CHANGE UP (ref 17–32)
CO2 SERPL-SCNC: 31 MMOL/L — SIGNIFICANT CHANGE UP (ref 17–32)
CREAT SERPL-MCNC: 0.6 MG/DL — LOW (ref 0.7–1.5)
FOLATE SERPL-MCNC: 15.3 NG/ML — SIGNIFICANT CHANGE UP
GLUCOSE BLDC GLUCOMTR-MCNC: 119 MG/DL — HIGH (ref 70–99)
GLUCOSE BLDC GLUCOMTR-MCNC: 124 MG/DL — HIGH (ref 70–99)
GLUCOSE BLDC GLUCOMTR-MCNC: 125 MG/DL — HIGH (ref 70–99)
GLUCOSE BLDC GLUCOMTR-MCNC: 131 MG/DL — HIGH (ref 70–99)
GLUCOSE BLDC GLUCOMTR-MCNC: 133 MG/DL — HIGH (ref 70–99)
GLUCOSE BLDC GLUCOMTR-MCNC: 150 MG/DL — HIGH (ref 70–99)
GLUCOSE BLDC GLUCOMTR-MCNC: 154 MG/DL — HIGH (ref 70–99)
GLUCOSE BLDC GLUCOMTR-MCNC: 155 MG/DL — HIGH (ref 70–99)
GLUCOSE BLDC GLUCOMTR-MCNC: 173 MG/DL — HIGH (ref 70–99)
GLUCOSE BLDC GLUCOMTR-MCNC: 177 MG/DL — HIGH (ref 70–99)
GLUCOSE BLDC GLUCOMTR-MCNC: 186 MG/DL — HIGH (ref 70–99)
GLUCOSE BLDC GLUCOMTR-MCNC: 187 MG/DL — HIGH (ref 70–99)
GLUCOSE BLDC GLUCOMTR-MCNC: 191 MG/DL — HIGH (ref 70–99)
GLUCOSE BLDC GLUCOMTR-MCNC: 193 MG/DL — HIGH (ref 70–99)
GLUCOSE BLDC GLUCOMTR-MCNC: 199 MG/DL — HIGH (ref 70–99)
GLUCOSE SERPL-MCNC: 141 MG/DL — HIGH (ref 70–99)
GLUCOSE SERPL-MCNC: 144 MG/DL — HIGH (ref 70–99)
GLUCOSE SERPL-MCNC: 182 MG/DL — HIGH (ref 70–99)
HCT VFR BLD CALC: 25.2 % — LOW (ref 37–47)
HGB BLD-MCNC: 8.8 G/DL — LOW (ref 12–16)
MAGNESIUM SERPL-MCNC: 1.6 MG/DL — LOW (ref 1.8–2.4)
MAGNESIUM SERPL-MCNC: 1.8 MG/DL — SIGNIFICANT CHANGE UP (ref 1.8–2.4)
MAGNESIUM SERPL-MCNC: 2 MG/DL — SIGNIFICANT CHANGE UP (ref 1.8–2.4)
MCHC RBC-ENTMCNC: 30.4 PG — SIGNIFICANT CHANGE UP (ref 27–31)
MCHC RBC-ENTMCNC: 34.9 G/DL — SIGNIFICANT CHANGE UP (ref 32–37)
MCV RBC AUTO: 87.2 FL — SIGNIFICANT CHANGE UP (ref 81–99)
NRBC # BLD: 0 /100 WBCS — SIGNIFICANT CHANGE UP (ref 0–0)
PHOSPHATE SERPL-MCNC: 2.5 MG/DL — SIGNIFICANT CHANGE UP (ref 2.1–4.9)
PHOSPHATE SERPL-MCNC: 2.6 MG/DL — SIGNIFICANT CHANGE UP (ref 2.1–4.9)
PHOSPHATE SERPL-MCNC: 3.2 MG/DL — SIGNIFICANT CHANGE UP (ref 2.1–4.9)
PLATELET # BLD AUTO: 234 K/UL — SIGNIFICANT CHANGE UP (ref 130–400)
POTASSIUM SERPL-MCNC: 3.6 MMOL/L — SIGNIFICANT CHANGE UP (ref 3.5–5)
POTASSIUM SERPL-MCNC: 4 MMOL/L — SIGNIFICANT CHANGE UP (ref 3.5–5)
POTASSIUM SERPL-MCNC: 4.6 MMOL/L — SIGNIFICANT CHANGE UP (ref 3.5–5)
POTASSIUM SERPL-SCNC: 3.6 MMOL/L — SIGNIFICANT CHANGE UP (ref 3.5–5)
POTASSIUM SERPL-SCNC: 4 MMOL/L — SIGNIFICANT CHANGE UP (ref 3.5–5)
POTASSIUM SERPL-SCNC: 4.6 MMOL/L — SIGNIFICANT CHANGE UP (ref 3.5–5)
RBC # BLD: 2.89 M/UL — LOW (ref 4.2–5.4)
RBC # FLD: 14.1 % — SIGNIFICANT CHANGE UP (ref 11.5–14.5)
SODIUM SERPL-SCNC: 136 MMOL/L — SIGNIFICANT CHANGE UP (ref 135–146)
SODIUM SERPL-SCNC: 138 MMOL/L — SIGNIFICANT CHANGE UP (ref 135–146)
SODIUM SERPL-SCNC: 139 MMOL/L — SIGNIFICANT CHANGE UP (ref 135–146)
VIT B12 SERPL-MCNC: 1326 PG/ML — HIGH (ref 232–1245)
WBC # BLD: 13.44 K/UL — HIGH (ref 4.8–10.8)
WBC # FLD AUTO: 13.44 K/UL — HIGH (ref 4.8–10.8)

## 2021-02-15 PROCEDURE — 71045 X-RAY EXAM CHEST 1 VIEW: CPT | Mod: 26

## 2021-02-15 PROCEDURE — 99233 SBSQ HOSP IP/OBS HIGH 50: CPT

## 2021-02-15 RX ORDER — POTASSIUM CHLORIDE 20 MEQ
20 PACKET (EA) ORAL ONCE
Refills: 0 | Status: COMPLETED | OUTPATIENT
Start: 2021-02-15 | End: 2021-02-15

## 2021-02-15 RX ORDER — ELECTROLYTE SOLUTION,INJ
1 VIAL (ML) INTRAVENOUS
Refills: 0 | Status: DISCONTINUED | OUTPATIENT
Start: 2021-02-15 | End: 2021-02-15

## 2021-02-15 RX ORDER — CALCIUM GLUCONATE 100 MG/ML
1 VIAL (ML) INTRAVENOUS ONCE
Refills: 0 | Status: COMPLETED | OUTPATIENT
Start: 2021-02-15 | End: 2021-02-15

## 2021-02-15 RX ORDER — KETOROLAC TROMETHAMINE 30 MG/ML
15 SYRINGE (ML) INJECTION EVERY 6 HOURS
Refills: 0 | Status: DISCONTINUED | OUTPATIENT
Start: 2021-02-15 | End: 2021-02-15

## 2021-02-15 RX ORDER — MAGNESIUM SULFATE 500 MG/ML
1 VIAL (ML) INJECTION ONCE
Refills: 0 | Status: COMPLETED | OUTPATIENT
Start: 2021-02-15 | End: 2021-02-15

## 2021-02-15 RX ORDER — POTASSIUM PHOSPHATE, MONOBASIC POTASSIUM PHOSPHATE, DIBASIC 236; 224 MG/ML; MG/ML
15 INJECTION, SOLUTION INTRAVENOUS ONCE
Refills: 0 | Status: DISCONTINUED | OUTPATIENT
Start: 2021-02-15 | End: 2021-02-15

## 2021-02-15 RX ORDER — MAGNESIUM SULFATE 500 MG/ML
3 VIAL (ML) INJECTION ONCE
Refills: 0 | Status: DISCONTINUED | OUTPATIENT
Start: 2021-02-15 | End: 2021-02-15

## 2021-02-15 RX ORDER — MAGNESIUM SULFATE 500 MG/ML
1 VIAL (ML) INJECTION
Refills: 0 | Status: DISCONTINUED | OUTPATIENT
Start: 2021-02-15 | End: 2021-02-15

## 2021-02-15 RX ORDER — MAGNESIUM SULFATE 500 MG/ML
2 VIAL (ML) INJECTION ONCE
Refills: 0 | Status: COMPLETED | OUTPATIENT
Start: 2021-02-15 | End: 2021-02-15

## 2021-02-15 RX ORDER — MORPHINE SULFATE 50 MG/1
2 CAPSULE, EXTENDED RELEASE ORAL ONCE
Refills: 0 | Status: DISCONTINUED | OUTPATIENT
Start: 2021-02-15 | End: 2021-02-15

## 2021-02-15 RX ORDER — INSULIN GLARGINE 100 [IU]/ML
30 INJECTION, SOLUTION SUBCUTANEOUS AT BEDTIME
Refills: 0 | Status: DISCONTINUED | OUTPATIENT
Start: 2021-02-15 | End: 2021-02-22

## 2021-02-15 RX ORDER — KETOROLAC TROMETHAMINE 30 MG/ML
15 SYRINGE (ML) INJECTION EVERY 6 HOURS
Refills: 0 | Status: DISCONTINUED | OUTPATIENT
Start: 2021-02-15 | End: 2021-02-16

## 2021-02-15 RX ADMIN — Medication 100 MILLIGRAM(S): at 00:49

## 2021-02-15 RX ADMIN — HEPARIN SODIUM 5000 UNIT(S): 5000 INJECTION INTRAVENOUS; SUBCUTANEOUS at 05:21

## 2021-02-15 RX ADMIN — CEFEPIME 100 MILLIGRAM(S): 1 INJECTION, POWDER, FOR SOLUTION INTRAMUSCULAR; INTRAVENOUS at 17:26

## 2021-02-15 RX ADMIN — Medication 100 MILLIGRAM(S): at 17:21

## 2021-02-15 RX ADMIN — MORPHINE SULFATE 2 MILLIGRAM(S): 50 CAPSULE, EXTENDED RELEASE ORAL at 21:11

## 2021-02-15 RX ADMIN — Medication 100 GRAM(S): at 06:48

## 2021-02-15 RX ADMIN — Medication 10 MILLIGRAM(S): at 06:30

## 2021-02-15 RX ADMIN — Medication 100 MILLIGRAM(S): at 10:09

## 2021-02-15 RX ADMIN — Medication 1 EACH: at 21:03

## 2021-02-15 RX ADMIN — MORPHINE SULFATE 2 MILLIGRAM(S): 50 CAPSULE, EXTENDED RELEASE ORAL at 12:15

## 2021-02-15 RX ADMIN — CEFEPIME 100 MILLIGRAM(S): 1 INJECTION, POWDER, FOR SOLUTION INTRAMUSCULAR; INTRAVENOUS at 05:21

## 2021-02-15 RX ADMIN — PANTOPRAZOLE SODIUM 40 MILLIGRAM(S): 20 TABLET, DELAYED RELEASE ORAL at 12:16

## 2021-02-15 RX ADMIN — INSULIN HUMAN 2 UNIT(S)/HR: 100 INJECTION, SOLUTION SUBCUTANEOUS at 09:17

## 2021-02-15 RX ADMIN — Medication 50 MILLIEQUIVALENT(S): at 03:20

## 2021-02-15 RX ADMIN — HEPARIN SODIUM 5000 UNIT(S): 5000 INJECTION INTRAVENOUS; SUBCUTANEOUS at 13:13

## 2021-02-15 RX ADMIN — MORPHINE SULFATE 2 MILLIGRAM(S): 50 CAPSULE, EXTENDED RELEASE ORAL at 00:59

## 2021-02-15 RX ADMIN — Medication 62.5 MILLIMOLE(S): at 06:39

## 2021-02-15 RX ADMIN — HEPARIN SODIUM 5000 UNIT(S): 5000 INJECTION INTRAVENOUS; SUBCUTANEOUS at 21:56

## 2021-02-15 RX ADMIN — MORPHINE SULFATE 2 MILLIGRAM(S): 50 CAPSULE, EXTENDED RELEASE ORAL at 06:55

## 2021-02-15 RX ADMIN — INSULIN GLARGINE 30 UNIT(S): 100 INJECTION, SOLUTION SUBCUTANEOUS at 21:56

## 2021-02-15 RX ADMIN — Medication 15 MILLIGRAM(S): at 17:22

## 2021-02-15 RX ADMIN — Medication 100 GRAM(S): at 22:08

## 2021-02-15 NOTE — PROGRESS NOTE ADULT - SUBJECTIVE AND OBJECTIVE BOX
GENERAL SURGERY PROGRESS NOTE     MELISSA CARUSO  55y  Female  Hospital day :5d  POD:  Procedure: Negative pressure wound therapy, greater than 50 sq cm    Open repair of strangulated ventral hernia    Abdominal washout    Right hemicolectomy    Resection of small bowel    Exploratory laparotomy      OVERNIGHT EVENTS: phos 2.9 from 1.6. Mg 1.8. hyperglycemic to 254. got labetolol for HTN. AAOx3.     T(F): 97.5 (21 @ 20:00), Max: 98.3 (21 @ 01:15)  HR: 66 (02-15-21 @ 00:00) (62 - 85)  BP: 169/78 (02-15-21 @ 00:00) (139/67 - 193/89)  ABP: --  ABP(mean): --  RR: 17 (02-15-21 @ 00:00) (13 - 25)  SpO2: 99% (02-15-21 @ 00:00) (98% - 100%)    DIET/FLUIDS: fat emulsion (Fish Oil and Plant Based) 20% Infusion 1.48 Gm/kG/Day IV Continuous <Continuous>  Parenteral Nutrition - Adult 1 Each TPN Continuous <Continuous>    N21 @ 07:01  -  21 @ 07:00  --------------------------------------------------------  OUT: 650 mL                                                                                 DRAINS:   21 @ 07:01  -  21 @ 07:00  --------------------------------------------------------  OUT: 100 mL      BM:     EMESIS:     URINE:      GI proph:  pantoprazole  Injectable 40 milliGRAM(s) IV Push daily    AC/ proph: heparin   Injectable 5000 Unit(s) SubCutaneous every 8 hours    ABx: cefepime   IVPB 1000 milliGRAM(s) IV Intermittent every 12 hours  metroNIDAZOLE  IVPB 500 milliGRAM(s) IV Intermittent every 8 hours      PHYSICAL EXAM:  GENERAL: NAD, well-appearing  CHEST/LUNG: Clear to auscultation bilaterally  HEART: Regular rate and rhythm  ABDOMEN: Soft, Nontender, Nondistended; VAC in place  EXTREMITIES:  No clubbing, cyanosis, or edema      LABS  Labs:  CAPILLARY BLOOD GLUCOSE      POCT Blood Glucose.: 199 mg/dL (2021 23:30)  POCT Blood Glucose.: 218 mg/dL (2021 22:23)  POCT Blood Glucose.: 218 mg/dL (2021 21:18)  POCT Blood Glucose.: 267 mg/dL (2021 20:07)  POCT Blood Glucose.: 243 mg/dL (2021 18:16)  POCT Blood Glucose.: 257 mg/dL (2021 13:37)  POCT Blood Glucose.: 277 mg/dL (2021 10:27)  POCT Blood Glucose.: 263 mg/dL (2021 05:39)  POCT Blood Glucose.: 269 mg/dL (2021 01:57)                          9.2    12.18 )-----------( 224      ( 2021 15:28 )             26.0         -14    140  |  99  |  20  ----------------------------<  254<H>  3.9   |  31  |  0.7      Calcium, Total Serum: 7.8 mg/dL (- @ 15:28)      LFTs:       ABG - ( 2021 19:32 )  pH: 7.42  /  pCO2: 26    /  pO2: 71    / HCO3: 17    / Base Excess: -6.8  /  SaO2: 95              ABG - ( 2021 17:25 )  pH: 7.44  /  pCO2: 25    /  pO2: 140   / HCO3: 17    / Base Excess: -6.2  /  SaO2: 98              ABG - ( 2021 15:06 )  pH: 7.41  /  pCO2: 28    /  pO2: 147   / HCO3: 18    / Base Excess: -5.7  /  SaO2: 99                Coags:                    RADIOLOGY & ADDITIONAL TESTS:

## 2021-02-15 NOTE — PROGRESS NOTE ADULT - ATTENDING COMMENTS
I personally examined this patient with the PA and resident and discussed my plan with them.    here for metabolic optimization post-resection  remains on insulin gtt, will calculate maintenance dose tonight and plan for lantus  follow-up primary team for npo with meds status  ao3  labetolol pushes awaiting po meds  cefepime flagyl for intra-abdominal sepsis, plan for 7d course unless new infection

## 2021-02-15 NOTE — PROGRESS NOTE ADULT - SUBJECTIVE AND OBJECTIVE BOX
MELISSA CARUSO  [id number]  55y Female    Indication for ICU admission: electrolyte derangements i/s/o extensive small bowel resection  Admit Date: 2/10/21  ICU Date: 2/11/21, 2/13/21  OR Date: 2/11/21    Allergies:   No Known Allergies    PAST MEDICAL & SURGICAL HISTORY:  Marijuana use, continuous  Hypertension  Diabetes  No pertinent past medical history  S/P tubal ligation 20 years ago    Home Medications:  ATENOLOL  100 MG TABS:  (11 Feb 2021 02:18)  ATORVASTATIN 40MG TABLETS: TAKE 1 TABLET BY MOUTH EVERY NIGHT AT BEDTIME (11 Feb 2021 02:18)  predniSONE: orally once a day (11 Feb 2021 02:18)  Robaxin 500 mg oral tablet: orally 2 times a day (11 Feb 2021 02:18)    24HRS EVENT:  DAY:   - if lytes normal x 2, then possible D/G   - c/w TPN, goal- 80  -@1528: K+ 3.9, Mg 1.8, Phos 2.9  - repleted 2g Mag and 15mmol K-Phos  - repeat lytes at 2330   - Insulin gtt 2cc/hr started for persistent hyperglycemia    ON  - no acute issues  - insulin gtt   - voiding, afebrile, VSS      DVT PTX: HSQ  GI PTX: pantoprazole  Injectable 40 milliGRAM(s) IV Push daily    REVIEW OF SYSTEMS  [X] A ten-point review of systems was otherwise negative except as noted.  [ ] Due to altered mental status/intubation, subjective information were not able to be obtained from the patient. History was obtained, to the extent possible, from review of the chart and collateral sources of information.     MELISSA CARUSO  [id number]  55y Female    Indication for ICU admission: electrolyte derangements i/s/o extensive small bowel resection  Admit Date: 2/10/21  ICU Date: 21, 21  OR Date: 21    Allergies:   No Known Allergies    PAST MEDICAL & SURGICAL HISTORY:  Marijuana use, continuous  Hypertension  Diabetes  No pertinent past medical history  S/P tubal ligation 20 years ago    Home Medications:  ATENOLOL  100 MG TABS:  (2021 02:18)  ATORVASTATIN 40MG TABLETS: TAKE 1 TABLET BY MOUTH EVERY NIGHT AT BEDTIME (2021 02:18)  predniSONE: orally once a day (2021 02:18)  Robaxin 500 mg oral tablet: orally 2 times a day (2021 02:18)    24HRS EVENT:  DAY:   - if lytes normal x 2, then possible D/G   - c/w TPN, goal- 80  -@1528: K+ 3.9, Mg 1.8, Phos 2.9  - repleted 2g Mag and 15mmol K-Phos  - repeat lytes at 2330   - Insulin gtt 2cc/hr started for persistent hyperglycemia    ON  - no acute issues  - insulin gtt   - voiding, afebrile, VSS      DVT PTX: HSQ  GI PTX: pantoprazole  Injectable 40 milliGRAM(s) IV Push daily    REVIEW OF SYSTEMS  [X] A ten-point review of systems was otherwise negative except as noted.  [ ] Due to altered mental status/intubation, subjective information were not able to be obtained from the patient. History was obtained, to the extent possible, from review of the chart and collateral sources of information.      Daily     Daily Weight in k.7 (15 Feb 2021 06:00)    Diet, NPO:   With Ice Chips/Sips of Water (21 @ 10:14)      CURRENT MEDS:  Neurologic Medications  morphine  - Injectable 2 milliGRAM(s) IV Push every 6 hours PRN Moderate Pain (4 - 6)    Respiratory Medications    Cardiovascular Medications  labetalol Injectable 10 milliGRAM(s) IV Push every 4 hours PRN SBP >170    Gastrointestinal Medications  fat emulsion (Fish Oil and Plant Based) 20% Infusion 1.48 Gm/kG/Day IV Continuous <Continuous>  pantoprazole  Injectable 40 milliGRAM(s) IV Push daily  Parenteral Nutrition - Adult 1 Each TPN Continuous <Continuous>    Genitourinary Medications    Hematologic/Oncologic Medications  heparin   Injectable 5000 Unit(s) SubCutaneous every 8 hours  influenza   Vaccine 0.5 milliLiter(s) IntraMuscular once    Antimicrobial/Immunologic Medications  cefepime   IVPB 1000 milliGRAM(s) IV Intermittent every 12 hours  metroNIDAZOLE  IVPB 500 milliGRAM(s) IV Intermittent every 8 hours    Endocrine/Metabolic Medications  insulin regular Infusion 2 Unit(s)/Hr IV Continuous <Continuous>    Topical/Other Medications  chlorhexidine 4% Liquid 1 Application(s) Topical daily      ICU Vital Signs Last 24 Hrs  T(C): 36.5 (15 Feb 2021 07:47), Max: 36.7 (2021 08:21)  T(F): 97.7 (15 Feb 2021 07:47), Max: 98 (2021 08:21)  HR: 75 (15 Feb 2021 07:00) (62 - 85)  BP: 154/70 (15 Feb 2021 07:00) (139/67 - 181/95)  BP(mean): 101 (15 Feb 2021 07:00) (98 - 130)  ABP: --  ABP(mean): --  RR: 20 (15 Feb 2021 07:00) (13 - 25)  SpO2: 98% (15 Feb 2021 07:00) (98% - 100%)      Adult Advanced Hemodynamics Last 24 Hrs  CVP(mm Hg): --  CVP(cm H2O): --  CO: --  CI: --  PA: --  PA(mean): --  PCWP: --  SVR: --  SVRI: --  PVR: --  PVRI: --          I&O's Summary    2021 07:01  -  15 Feb 2021 07:00  --------------------------------------------------------  IN: 3040.2 mL / OUT: 3550 mL / NET: -509.8 mL    15 Feb 2021 07:01  -  15 Feb 2021 07:49  --------------------------------------------------------  IN: 62.5 mL / OUT: 0 mL / NET: 62.5 mL      I&O's Detail    2021 07:01  -  15 Feb 2021 07:00  --------------------------------------------------------  IN:    Fat Emulsion (Fish Oil &amp; Plant Based) 20% Infusion: 281.7 mL    Insulin: 76 mL    IV PiggyBack: 100 mL    IV PiggyBack: 200 mL    IV PiggyBack: 150 mL    IV PiggyBack: 312.5 mL    TPN (Total Parenteral Nutrition): 1920 mL  Total IN: 3040.2 mL    OUT:    Nasogastric/Oral tube (mL): 100 mL    VAC (Vacuum Assisted Closure) System (mL): 50 mL    Voided (mL): 3400 mL  Total OUT: 3550 mL    Total NET: -509.8 mL      15 Feb 2021 07:01  -  15 Feb 2021 07:49  --------------------------------------------------------  IN:    IV PiggyBack: 62.5 mL  Total IN: 62.5 mL    OUT:  Total OUT: 0 mL    Total NET: 62.5 mL          PHYSICAL EXAM:    General/Neuro  RASS:             GCS:     = E   / V   / M      Deficits:                             alert & oriented x 3, no focal deficits  Pupils:    Lungs:      clear to auscultation, Normal expansion/effort.     Cardiovascular : S1, S2.  Regular rate and rhythm.  Peripheral edema   Cardiac Rhythm: Normal Sinus Rhythm    GI: Abdomen soft, Non-tender, Non-distended.    Gastrostomy / Jejunostomy tube in place.  Nasogastric tube in place.  Colostomy / Ileostomy.    Wound:    Extremities: Extremities warm, pink, well-perfused. Pulses:Rt     Lt    Derm: Good skin turgor, no skin breakdown.      :       Tavarez catheter in place.      CXR:       LABS:  CAPILLARY BLOOD GLUCOSE      POCT Blood Glucose.: 150 mg/dL (15 Feb 2021 06:03)  POCT Blood Glucose.: 155 mg/dL (15 Feb 2021 04:16)  POCT Blood Glucose.: 133 mg/dL (15 Feb 2021 03:09)  POCT Blood Glucose.: 154 mg/dL (15 Feb 2021 02:20)  POCT Blood Glucose.: 177 mg/dL (15 Feb 2021 00:54)  POCT Blood Glucose.: 199 mg/dL (2021 23:30)  POCT Blood Glucose.: 218 mg/dL (2021 22:23)  POCT Blood Glucose.: 218 mg/dL (2021 21:18)  POCT Blood Glucose.: 267 mg/dL (2021 20:07)  POCT Blood Glucose.: 243 mg/dL (2021 18:16)  POCT Blood Glucose.: 257 mg/dL (2021 13:37)  POCT Blood Glucose.: 277 mg/dL (2021 10:27)                          8.8    13.44 )-----------( 234      ( 15 Feb 2021 05:34 )             25.2       02-15    139  |  99  |  21<H>  ----------------------------<  141<H>  4.0   |  28  |  0.6<L>    Ca    7.5<L>      15 Feb 2021 05:34  Phos  2.5     -15  Mg     1.8     02-15                     MELISSA CARUSO  [id number]  55y Female    Indication for ICU admission: electrolyte derangements i/s/o extensive small bowel resection  Admit Date: 2/10/21  ICU Date: 21, 21  OR Date: 21    Allergies:   No Known Allergies    PAST MEDICAL & SURGICAL HISTORY:  Marijuana use, continuous  Hypertension  Diabetes  No pertinent past medical history  S/P tubal ligation 20 years ago    Home Medications:  ATENOLOL  100 MG TABS:  (2021 02:18)  ATORVASTATIN 40MG TABLETS: TAKE 1 TABLET BY MOUTH EVERY NIGHT AT BEDTIME (2021 02:18)  predniSONE: orally once a day (2021 02:18)  Robaxin 500 mg oral tablet: orally 2 times a day (2021 02:18)    24HRS EVENT:  DAY:   - if lytes normal x 2, then possible D/G   - c/w TPN, goal- 80  -@1528: K+ 3.9, Mg 1.8, Phos 2.9  - repleted 2g Mag and 15mmol K-Phos  - repeat lytes at 2330   - Insulin gtt 2cc/hr started for persistent hyperglycemia    ON  - no acute issues  - insulin gtt   - voiding, afebrile, VSS      DVT PTX: HSQ  GI PTX: pantoprazole  Injectable 40 milliGRAM(s) IV Push daily    REVIEW OF SYSTEMS  [X] A ten-point review of systems was otherwise negative except as noted.  [ ] Due to altered mental status/intubation, subjective information were not able to be obtained from the patient. History was obtained, to the extent possible, from review of the chart and collateral sources of information.      Daily     Daily Weight in k.7 (15 Feb 2021 06:00)    Diet, NPO:   With Ice Chips/Sips of Water (21 @ 10:14)      CURRENT MEDS:  Neurologic Medications  morphine  - Injectable 2 milliGRAM(s) IV Push every 6 hours PRN Moderate Pain (4 - 6)    Respiratory Medications    Cardiovascular Medications  labetalol Injectable 10 milliGRAM(s) IV Push every 4 hours PRN SBP >170    Gastrointestinal Medications  fat emulsion (Fish Oil and Plant Based) 20% Infusion 1.48 Gm/kG/Day IV Continuous <Continuous>  pantoprazole  Injectable 40 milliGRAM(s) IV Push daily  Parenteral Nutrition - Adult 1 Each TPN Continuous <Continuous>    Genitourinary Medications    Hematologic/Oncologic Medications  heparin   Injectable 5000 Unit(s) SubCutaneous every 8 hours  influenza   Vaccine 0.5 milliLiter(s) IntraMuscular once    Antimicrobial/Immunologic Medications  cefepime   IVPB 1000 milliGRAM(s) IV Intermittent every 12 hours  metroNIDAZOLE  IVPB 500 milliGRAM(s) IV Intermittent every 8 hours    Endocrine/Metabolic Medications  insulin regular Infusion 2 Unit(s)/Hr IV Continuous <Continuous>    Topical/Other Medications  chlorhexidine 4% Liquid 1 Application(s) Topical daily      ICU Vital Signs Last 24 Hrs  T(C): 36.5 (15 Feb 2021 07:47), Max: 36.7 (2021 08:21)  T(F): 97.7 (15 Feb 2021 07:47), Max: 98 (2021 08:21)  HR: 75 (15 Feb 2021 07:00) (62 - 85)  BP: 154/70 (15 Feb 2021 07:00) (139/67 - 181/95)  BP(mean): 101 (15 Feb 2021 07:00) (98 - 130)  ABP: --  ABP(mean): --  RR: 20 (15 Feb 2021 07:00) (13 - 25)  SpO2: 98% (15 Feb 2021 07:00) (98% - 100%)      Adult Advanced Hemodynamics Last 24 Hrs  CVP(mm Hg): --  CVP(cm H2O): --  CO: --  CI: --  PA: --  PA(mean): --  PCWP: --  SVR: --  SVRI: --  PVR: --  PVRI: --          I&O's Summary    2021 07:01  -  15 Feb 2021 07:00  --------------------------------------------------------  IN: 3040.2 mL / OUT: 3550 mL / NET: -509.8 mL    15 Feb 2021 07:01  -  15 Feb 2021 07:49  --------------------------------------------------------  IN: 62.5 mL / OUT: 0 mL / NET: 62.5 mL      I&O's Detail    2021 07:01  -  15 Feb 2021 07:00  --------------------------------------------------------  IN:    Fat Emulsion (Fish Oil &amp; Plant Based) 20% Infusion: 281.7 mL    Insulin: 76 mL    IV PiggyBack: 100 mL    IV PiggyBack: 200 mL    IV PiggyBack: 150 mL    IV PiggyBack: 312.5 mL    TPN (Total Parenteral Nutrition): 1920 mL  Total IN: 3040.2 mL    OUT:    Nasogastric/Oral tube (mL): 100 mL    VAC (Vacuum Assisted Closure) System (mL): 50 mL    Voided (mL): 3400 mL  Total OUT: 3550 mL    Total NET: -509.8 mL      15 Feb 2021 07:01  -  15 Feb 2021 07:49  --------------------------------------------------------  IN:    IV PiggyBack: 62.5 mL  Total IN: 62.5 mL    OUT:  Total OUT: 0 mL    Total NET: 62.5 mL          PHYSICAL EXAM:    General/Neuro  A&oriented    Lungs:    Normal expansion/effort.     Cardiovascular :   Cardiac Rhythm: Normal Sinus Rhythm    GI: Abdomen soft, Non-tender, Non-distended.  abdominal binder in place  mild tender to palpation      Extremities: Extremities warm, pink, well-perfused.     Derm: Good skin turgor, no skin breakdown.      :       Tavarez catheter in place.      CXR:       LABS:  CAPILLARY BLOOD GLUCOSE      POCT Blood Glucose.: 150 mg/dL (15 Feb 2021 06:03)  POCT Blood Glucose.: 155 mg/dL (15 Feb 2021 04:16)  POCT Blood Glucose.: 133 mg/dL (15 Feb 2021 03:09)  POCT Blood Glucose.: 154 mg/dL (15 Feb 2021 02:20)  POCT Blood Glucose.: 177 mg/dL (15 Feb 2021 00:54)  POCT Blood Glucose.: 199 mg/dL (2021 23:30)  POCT Blood Glucose.: 218 mg/dL (2021 22:23)  POCT Blood Glucose.: 218 mg/dL (2021 21:18)  POCT Blood Glucose.: 267 mg/dL (2021 20:07)  POCT Blood Glucose.: 243 mg/dL (2021 18:16)  POCT Blood Glucose.: 257 mg/dL (2021 13:37)  POCT Blood Glucose.: 277 mg/dL (2021 10:27)                          8.8    13.44 )-----------( 234      ( 15 Feb 2021 05:34 )             25.2       -15    139  |  99  |  21<H>  ----------------------------<  141<H>  4.0   |  28  |  0.6<L>    Ca    7.5<L>      15 Feb 2021 05:34  Phos  2.5     02-15  Mg     1.8     02-15

## 2021-02-15 NOTE — PROGRESS NOTE ADULT - ASSESSMENT
Assessment & Plan  55F w/ PMH of HTN, HLD, and DM 2 admitted for strangulated ventral hernia s/p small bowel resection (165cm) and right hemicolectomy with primary anastomosis. SICU consulted for management of severe electrolyte disturbances.     NEURO:  pain: morphine PRN    RESP:  extubated 2/11, currently saturating well on RA  activity: ambulate as tolerated, pt was OOBTC today  AM CXR unchanged    CARDS:   PMH of HTN on metoprolol at home  - currently on labetolol 10 q4  post-operatively trops were elevated to 0.1, attributed to demand ischemia, no longer trending  last EKG 2/11 PM: NSR w/ occasional PVCs?, QTc 454 -- FU stat EKG, AM EKG  ECHO 2/11: EF 63%, G1DD, moderate RVE, mild TR/MR    GI/NUTR:  strangulated ventral hernia s/p small bowel resection (165cm) and right hemicolectomy with primary anastomosis  - continue NGT to LCWS  - wound vac in place with serosanguinous output (last changed **)  diet: NPO w/ ice chips, TPN  - c/w TPN, goal- 80  GI PPX: PTX  bowel regimen: holding    /RENAL:   strict I/Os: primafit in place  UOP:   BUN/Cr- 39/1.1  -->,  33/0.9  -->,  31/0.9  -->  Electrolyte derangements: hypokalemia, hypophosphoremia, hyperglycemia  - 2/12 23:30 - K 2.8, Mg 1.9, Phos 2.9  - 2/13 05:41 - K 2.7, Mg 1.9, Phos 2.1  - 07:34 - 20 KCl x 2 = 40 mEq K  - 07:51 - , 6U lispro  - 11:00 20 K = 20 mEq K  - 12:15 - 1g Mg,  --> 6U lispro  - 16:37 -  --> 9U lispro  - 17:56 - 2U lispro, 2g Mg  - 18:15 - K 2.7, Mg 2.4, Phos 0.7  - 21:26 - 30 KPhos, 20 K x 2 = 45 + 40mEq = 85 mEq K  - 2/14 00:49 Na 134, K 3.3, Mg 1.9, Phos 1.6  - 01:49 20 K = 20 mEq  -@1528: K+ 3.9, Mg 1.8, Phos 2.9  - repleted 2g Mag and 15mmol K-Phos  - repeat lytes at 2330       HEME/ONC:   DVT PPX: HSQ  Hb 8.4 > 8.5    ID:  antibiotics: cefepime 1000mg Q12H, flagyl 500mg Q8H  WBC: 14 > 16 > 17  afebrile    ENDO:  PMH of DM on metformin, lantus 33U QHS, and trulicity 75 mg Qweek at home- Q4H FS  permissive hyperglycemia to 200s-300s  - Insulin gtt 2cc/hr started for persistent hyperglycemia  HbA1c 6.2 (2/13)    LINES/DRAINS: PIV, L PICC  DISPO: SICU     Assessment & Plan  55F w/ PMH of HTN, HLD, and DM 2 admitted for strangulated ventral hernia s/p small bowel resection (165cm) and right hemicolectomy with primary anastomosis. SICU consulted for management of severe electrolyte disturbances.     NEURO:  pain: morphine PRN    RESP:  extubated 2/11, currently saturating well on RA  activity: ambulate as tolerated, pt was OOBTC today    CARDS:   PMH of HTN on metoprolol at home  - currently on labetolol 10 q4  post-operatively trops were elevated to 0.1, attributed to demand ischemia, no longer trending  last EKG 2/11 PM: NSR w/ occasional PVCs?, QTc 454 -- FU stat EKG, AM EKG  ECHO 2/11: EF 63%, G1DD, moderate RVE, mild TR/MR    GI/NUTR:  strangulated ventral hernia s/p small bowel resection (165cm) and right hemicolectomy with primary anastomosis  - continue NGT to LCWS  - wound vac in place with serosanguinous output (last changed **)  diet: NPO w/ ice chips, TPN  - c/w TPN, goal- 80  GI PPX: PTX  bowel regimen: holding    /RENAL:   Monitor UO-silver in place    BUN/Cr- 20/0.7  -->,  22/0.6  -->,  21/0.6  -->  Electrolytes-Na 139 // K 4.0 // Mg 1.8 //  Phos 2.5 02-15 @ 05:34  Na 136 // K 3.6 // Mg 2.0 //  Phos 2.6 02-15 @ 00:33    HEME/ONC:   DVT propylaxis-heparin   Injectable  Hb/Hct:  8.5/24.0  -->,  9.2/26.0  -->,  8.8/25.2    Plts:  149  -->,  224  -->,  234      ID:  antibiotics: cefepime 1000mg Q12H, flagyl 500mg Q8H  Leukocytosis- 17.46  --->>,  12.18  --->>,  13.44  --->>  Temp trend- 24hrs T(F): 97.7 (02-15 @ 07:47), Max: 98 (02-14 @ 08:21)  Antibiotics-cefepime   IVPB 1000 every 12 hours  influenza   Vaccine 0.5 once  metroNIDAZOLE  IVPB 500 every 8 hours    ENDO:  PMH of DM on metformin, lantus 33U QHS, and trulicity 75 mg Qweek at home- Q4H FS    Insulin gtt 2cc/hr started for persistent hyperglycemia 76u/24hrs    HbA1c 6.2 (2/13)      LINES/DRAINS: PIV, L PICC  DISPO: SDU

## 2021-02-16 LAB
ANION GAP SERPL CALC-SCNC: 9 MMOL/L — SIGNIFICANT CHANGE UP (ref 7–14)
APPEARANCE UR: CLEAR — SIGNIFICANT CHANGE UP
APTT BLD: 28.6 SEC — SIGNIFICANT CHANGE UP (ref 27–39.2)
BACTERIA # UR AUTO: NEGATIVE — SIGNIFICANT CHANGE UP
BASOPHILS # BLD AUTO: 0.13 K/UL — SIGNIFICANT CHANGE UP (ref 0–0.2)
BASOPHILS NFR BLD AUTO: 0.6 % — SIGNIFICANT CHANGE UP (ref 0–1)
BILIRUB UR-MCNC: NEGATIVE — SIGNIFICANT CHANGE UP
BUN SERPL-MCNC: 26 MG/DL — HIGH (ref 10–20)
CALCIUM SERPL-MCNC: 8.2 MG/DL — LOW (ref 8.5–10.1)
CHLORIDE SERPL-SCNC: 99 MMOL/L — SIGNIFICANT CHANGE UP (ref 98–110)
CO2 SERPL-SCNC: 29 MMOL/L — SIGNIFICANT CHANGE UP (ref 17–32)
COLOR SPEC: YELLOW — SIGNIFICANT CHANGE UP
CREAT SERPL-MCNC: 0.8 MG/DL — SIGNIFICANT CHANGE UP (ref 0.7–1.5)
CULTURE RESULTS: SIGNIFICANT CHANGE UP
DIFF PNL FLD: NEGATIVE — SIGNIFICANT CHANGE UP
EOSINOPHIL # BLD AUTO: 0.71 K/UL — HIGH (ref 0–0.7)
EOSINOPHIL NFR BLD AUTO: 3.2 % — SIGNIFICANT CHANGE UP (ref 0–8)
EPI CELLS # UR: 3 /HPF — SIGNIFICANT CHANGE UP (ref 0–5)
GLUCOSE BLDC GLUCOMTR-MCNC: 125 MG/DL — HIGH (ref 70–99)
GLUCOSE BLDC GLUCOMTR-MCNC: 127 MG/DL — HIGH (ref 70–99)
GLUCOSE BLDC GLUCOMTR-MCNC: 138 MG/DL — HIGH (ref 70–99)
GLUCOSE BLDC GLUCOMTR-MCNC: 143 MG/DL — HIGH (ref 70–99)
GLUCOSE BLDC GLUCOMTR-MCNC: 145 MG/DL — HIGH (ref 70–99)
GLUCOSE BLDC GLUCOMTR-MCNC: 147 MG/DL — HIGH (ref 70–99)
GLUCOSE BLDC GLUCOMTR-MCNC: 164 MG/DL — HIGH (ref 70–99)
GLUCOSE BLDC GLUCOMTR-MCNC: 174 MG/DL — HIGH (ref 70–99)
GLUCOSE BLDC GLUCOMTR-MCNC: 178 MG/DL — HIGH (ref 70–99)
GLUCOSE BLDC GLUCOMTR-MCNC: 191 MG/DL — HIGH (ref 70–99)
GLUCOSE SERPL-MCNC: 119 MG/DL — HIGH (ref 70–99)
GLUCOSE UR QL: SIGNIFICANT CHANGE UP
HCT VFR BLD CALC: 27 % — LOW (ref 37–47)
HCT VFR BLD CALC: 27.1 % — LOW (ref 37–47)
HGB BLD-MCNC: 9 G/DL — LOW (ref 12–16)
HGB BLD-MCNC: 9.3 G/DL — LOW (ref 12–16)
HYALINE CASTS # UR AUTO: 2 /LPF — SIGNIFICANT CHANGE UP (ref 0–7)
IMM GRANULOCYTES NFR BLD AUTO: 17.2 % — HIGH (ref 0.1–0.3)
INR BLD: 0.94 RATIO — SIGNIFICANT CHANGE UP (ref 0.65–1.3)
KETONES UR-MCNC: NEGATIVE — SIGNIFICANT CHANGE UP
LEUKOCYTE ESTERASE UR-ACNC: NEGATIVE — SIGNIFICANT CHANGE UP
LYMPHOCYTES # BLD AUTO: 11.9 % — LOW (ref 20.5–51.1)
LYMPHOCYTES # BLD AUTO: 2.63 K/UL — SIGNIFICANT CHANGE UP (ref 1.2–3.4)
MAGNESIUM SERPL-MCNC: 2.5 MG/DL — HIGH (ref 1.8–2.4)
MCHC RBC-ENTMCNC: 29.9 PG — SIGNIFICANT CHANGE UP (ref 27–31)
MCHC RBC-ENTMCNC: 30.5 PG — SIGNIFICANT CHANGE UP (ref 27–31)
MCHC RBC-ENTMCNC: 33.2 G/DL — SIGNIFICANT CHANGE UP (ref 32–37)
MCHC RBC-ENTMCNC: 34.4 G/DL — SIGNIFICANT CHANGE UP (ref 32–37)
MCV RBC AUTO: 88.5 FL — SIGNIFICANT CHANGE UP (ref 81–99)
MCV RBC AUTO: 90 FL — SIGNIFICANT CHANGE UP (ref 81–99)
MONOCYTES # BLD AUTO: 1.43 K/UL — HIGH (ref 0.1–0.6)
MONOCYTES NFR BLD AUTO: 6.4 % — SIGNIFICANT CHANGE UP (ref 1.7–9.3)
NEUTROPHILS # BLD AUTO: 13.46 K/UL — HIGH (ref 1.4–6.5)
NEUTROPHILS NFR BLD AUTO: 60.7 % — SIGNIFICANT CHANGE UP (ref 42.2–75.2)
NITRITE UR-MCNC: NEGATIVE — SIGNIFICANT CHANGE UP
NRBC # BLD: 0 /100 WBCS — SIGNIFICANT CHANGE UP (ref 0–0)
NRBC # BLD: 0 /100 WBCS — SIGNIFICANT CHANGE UP (ref 0–0)
PH UR: 6 — SIGNIFICANT CHANGE UP (ref 5–8)
PHOSPHATE SERPL-MCNC: 3.6 MG/DL — SIGNIFICANT CHANGE UP (ref 2.1–4.9)
PLATELET # BLD AUTO: 290 K/UL — SIGNIFICANT CHANGE UP (ref 130–400)
PLATELET # BLD AUTO: 335 K/UL — SIGNIFICANT CHANGE UP (ref 130–400)
POTASSIUM SERPL-MCNC: 5.1 MMOL/L — HIGH (ref 3.5–5)
POTASSIUM SERPL-SCNC: 5.1 MMOL/L — HIGH (ref 3.5–5)
PROT UR-MCNC: ABNORMAL
PROTHROM AB SERPL-ACNC: 10.8 SEC — SIGNIFICANT CHANGE UP (ref 9.95–12.87)
RBC # BLD: 3.01 M/UL — LOW (ref 4.2–5.4)
RBC # BLD: 3.05 M/UL — LOW (ref 4.2–5.4)
RBC # FLD: 14.4 % — SIGNIFICANT CHANGE UP (ref 11.5–14.5)
RBC # FLD: 14.5 % — SIGNIFICANT CHANGE UP (ref 11.5–14.5)
RBC CASTS # UR COMP ASSIST: 1 /HPF — SIGNIFICANT CHANGE UP (ref 0–4)
SODIUM SERPL-SCNC: 137 MMOL/L — SIGNIFICANT CHANGE UP (ref 135–146)
SP GR SPEC: 1.02 — SIGNIFICANT CHANGE UP (ref 1.01–1.03)
SPECIMEN SOURCE: SIGNIFICANT CHANGE UP
UROBILINOGEN FLD QL: SIGNIFICANT CHANGE UP
WBC # BLD: 20.37 K/UL — HIGH (ref 4.8–10.8)
WBC # BLD: 22.18 K/UL — HIGH (ref 4.8–10.8)
WBC # FLD AUTO: 20.37 K/UL — HIGH (ref 4.8–10.8)
WBC # FLD AUTO: 22.18 K/UL — HIGH (ref 4.8–10.8)
WBC UR QL: 3 /HPF — SIGNIFICANT CHANGE UP (ref 0–5)

## 2021-02-16 PROCEDURE — 71045 X-RAY EXAM CHEST 1 VIEW: CPT | Mod: 26

## 2021-02-16 PROCEDURE — 99232 SBSQ HOSP IP/OBS MODERATE 35: CPT

## 2021-02-16 PROCEDURE — 93970 EXTREMITY STUDY: CPT | Mod: 26

## 2021-02-16 PROCEDURE — 76705 ECHO EXAM OF ABDOMEN: CPT | Mod: 26

## 2021-02-16 RX ORDER — OXYCODONE HYDROCHLORIDE 5 MG/1
5 TABLET ORAL EVERY 6 HOURS
Refills: 0 | Status: DISCONTINUED | OUTPATIENT
Start: 2021-02-16 | End: 2021-02-22

## 2021-02-16 RX ORDER — ATENOLOL 25 MG/1
100 TABLET ORAL DAILY
Refills: 0 | Status: DISCONTINUED | OUTPATIENT
Start: 2021-02-16 | End: 2021-02-22

## 2021-02-16 RX ORDER — INSULIN HUMAN 100 [IU]/ML
INJECTION, SOLUTION SUBCUTANEOUS EVERY 6 HOURS
Refills: 0 | Status: DISCONTINUED | OUTPATIENT
Start: 2021-02-16 | End: 2021-02-22

## 2021-02-16 RX ORDER — ATORVASTATIN CALCIUM 80 MG/1
40 TABLET, FILM COATED ORAL AT BEDTIME
Refills: 0 | Status: DISCONTINUED | OUTPATIENT
Start: 2021-02-16 | End: 2021-02-22

## 2021-02-16 RX ORDER — CEFEPIME 1 G/1
2000 INJECTION, POWDER, FOR SOLUTION INTRAMUSCULAR; INTRAVENOUS EVERY 8 HOURS
Refills: 0 | Status: DISCONTINUED | OUTPATIENT
Start: 2021-02-16 | End: 2021-02-16

## 2021-02-16 RX ORDER — DEXTROSE 50 % IN WATER 50 %
25 SYRINGE (ML) INTRAVENOUS ONCE
Refills: 0 | Status: DISCONTINUED | OUTPATIENT
Start: 2021-02-16 | End: 2021-02-17

## 2021-02-16 RX ORDER — PANTOPRAZOLE SODIUM 20 MG/1
40 TABLET, DELAYED RELEASE ORAL DAILY
Refills: 0 | Status: DISCONTINUED | OUTPATIENT
Start: 2021-02-16 | End: 2021-02-22

## 2021-02-16 RX ORDER — GLUCAGON INJECTION, SOLUTION 0.5 MG/.1ML
1 INJECTION, SOLUTION SUBCUTANEOUS ONCE
Refills: 0 | Status: DISCONTINUED | OUTPATIENT
Start: 2021-02-16 | End: 2021-02-17

## 2021-02-16 RX ORDER — INSULIN HUMAN 100 [IU]/ML
INJECTION, SOLUTION SUBCUTANEOUS EVERY 4 HOURS
Refills: 0 | Status: DISCONTINUED | OUTPATIENT
Start: 2021-02-16 | End: 2021-02-16

## 2021-02-16 RX ORDER — CEFEPIME 1 G/1
1000 INJECTION, POWDER, FOR SOLUTION INTRAMUSCULAR; INTRAVENOUS ONCE
Refills: 0 | Status: COMPLETED | OUTPATIENT
Start: 2021-02-16 | End: 2021-02-16

## 2021-02-16 RX ORDER — I.V. FAT EMULSION 20 G/100ML
1.48 EMULSION INTRAVENOUS
Qty: 100.05 | Refills: 0 | Status: DISCONTINUED | OUTPATIENT
Start: 2021-02-16 | End: 2021-02-16

## 2021-02-16 RX ORDER — CEFEPIME 1 G/1
2000 INJECTION, POWDER, FOR SOLUTION INTRAMUSCULAR; INTRAVENOUS EVERY 12 HOURS
Refills: 0 | Status: DISCONTINUED | OUTPATIENT
Start: 2021-02-16 | End: 2021-02-18

## 2021-02-16 RX ORDER — SODIUM CHLORIDE 9 MG/ML
1000 INJECTION, SOLUTION INTRAVENOUS
Refills: 0 | Status: DISCONTINUED | OUTPATIENT
Start: 2021-02-16 | End: 2021-02-17

## 2021-02-16 RX ORDER — ACETAMINOPHEN 500 MG
650 TABLET ORAL EVERY 6 HOURS
Refills: 0 | Status: COMPLETED | OUTPATIENT
Start: 2021-02-16 | End: 2021-02-19

## 2021-02-16 RX ORDER — CEFEPIME 1 G/1
INJECTION, POWDER, FOR SOLUTION INTRAMUSCULAR; INTRAVENOUS
Refills: 0 | Status: COMPLETED | OUTPATIENT
Start: 2021-02-16 | End: 2021-02-16

## 2021-02-16 RX ORDER — DEXTROSE 50 % IN WATER 50 %
15 SYRINGE (ML) INTRAVENOUS ONCE
Refills: 0 | Status: DISCONTINUED | OUTPATIENT
Start: 2021-02-16 | End: 2021-02-17

## 2021-02-16 RX ORDER — DEXTROSE 50 % IN WATER 50 %
12.5 SYRINGE (ML) INTRAVENOUS ONCE
Refills: 0 | Status: DISCONTINUED | OUTPATIENT
Start: 2021-02-16 | End: 2021-02-17

## 2021-02-16 RX ORDER — ELECTROLYTE SOLUTION,INJ
1 VIAL (ML) INTRAVENOUS
Refills: 0 | Status: DISCONTINUED | OUTPATIENT
Start: 2021-02-16 | End: 2021-02-16

## 2021-02-16 RX ADMIN — CEFEPIME 100 MILLIGRAM(S): 1 INJECTION, POWDER, FOR SOLUTION INTRAMUSCULAR; INTRAVENOUS at 06:33

## 2021-02-16 RX ADMIN — ATENOLOL 100 MILLIGRAM(S): 25 TABLET ORAL at 13:17

## 2021-02-16 RX ADMIN — Medication 50 GRAM(S): at 00:15

## 2021-02-16 RX ADMIN — ATORVASTATIN CALCIUM 40 MILLIGRAM(S): 80 TABLET, FILM COATED ORAL at 22:25

## 2021-02-16 RX ADMIN — Medication 100 MILLIGRAM(S): at 01:21

## 2021-02-16 RX ADMIN — Medication 10 MILLIGRAM(S): at 08:46

## 2021-02-16 RX ADMIN — Medication 650 MILLIGRAM(S): at 18:03

## 2021-02-16 RX ADMIN — INSULIN GLARGINE 30 UNIT(S): 100 INJECTION, SOLUTION SUBCUTANEOUS at 21:36

## 2021-02-16 RX ADMIN — INSULIN HUMAN 3: 100 INJECTION, SOLUTION SUBCUTANEOUS at 10:27

## 2021-02-16 RX ADMIN — Medication 100 MILLIGRAM(S): at 08:46

## 2021-02-16 RX ADMIN — HEPARIN SODIUM 5000 UNIT(S): 5000 INJECTION INTRAVENOUS; SUBCUTANEOUS at 13:18

## 2021-02-16 RX ADMIN — Medication 1 EACH: at 21:32

## 2021-02-16 RX ADMIN — INSULIN HUMAN 3: 100 INJECTION, SOLUTION SUBCUTANEOUS at 19:02

## 2021-02-16 RX ADMIN — Medication 100 GRAM(S): at 00:46

## 2021-02-16 RX ADMIN — HEPARIN SODIUM 5000 UNIT(S): 5000 INJECTION INTRAVENOUS; SUBCUTANEOUS at 21:36

## 2021-02-16 RX ADMIN — CEFEPIME 100 MILLIGRAM(S): 1 INJECTION, POWDER, FOR SOLUTION INTRAMUSCULAR; INTRAVENOUS at 18:04

## 2021-02-16 RX ADMIN — Medication 15 MILLIGRAM(S): at 10:49

## 2021-02-16 RX ADMIN — Medication 100 MILLIGRAM(S): at 17:08

## 2021-02-16 RX ADMIN — INSULIN HUMAN: 100 INJECTION, SOLUTION SUBCUTANEOUS at 14:18

## 2021-02-16 RX ADMIN — HEPARIN SODIUM 5000 UNIT(S): 5000 INJECTION INTRAVENOUS; SUBCUTANEOUS at 06:33

## 2021-02-16 RX ADMIN — INSULIN HUMAN 2 UNIT(S)/HR: 100 INJECTION, SOLUTION SUBCUTANEOUS at 03:33

## 2021-02-16 RX ADMIN — I.V. FAT EMULSION 31.3 GM/KG/DAY: 20 EMULSION INTRAVENOUS at 21:32

## 2021-02-16 NOTE — PROGRESS NOTE ADULT - SUBJECTIVE AND OBJECTIVE BOX
GENERAL SURGERY PROGRESS NOTE     MELISSA CARUSO  55y  Female  Hospital day :6d  POD:  Procedure: Negative pressure wound therapy, greater than 50 sq cm    Open repair of strangulated ventral hernia    Abdominal washout    Right hemicolectomy    Resection of small bowel    Exploratory laparotomy      EVENTS IN THE LAST 24 HRS: No acute events, patient stable, NGT out, remains on NPO, pain controlled, had 2 BM, VAC working no leaks      T(F): 97.4 (02-16-21 @ 07:45), Max: 99.1 (02-15-21 @ 20:00)  HR: 82 (02-16-21 @ 08:00) (67 - 83)  BP: 178/78 (02-16-21 @ 08:00) (123/60 - 182/83)  RR: 20 (02-16-21 @ 08:00) (14 - 27)  SpO2: 100% (02-16-21 @ 08:00) (97% - 100%)    PHYSICAL EXAM:  GENERAL: NAD,  CHEST/LUNG: Clear to auscultation bilaterally  HEART: Regular rate and rhythm  ABDOMEN: Soft, Nontender, Nondistended; VAC working no leaking  EXTREMITIES:  No clubbing, cyanosis, or edema      DIET/FLUIDS: dextrose 5%. 1000 milliLiter(s) IV Continuous <Continuous>  dextrose 5%. 1000 milliLiter(s) IV Continuous <Continuous>  Parenteral Nutrition - Adult 1 Each TPN Continuous <Continuous>    NG:                                                                                DRAINS:   02-15-21 @ 07:01  -  02-16-21 @ 07:00  --------------------------------------------------------  OUT: 300 mL        GI proph:  pantoprazole  Injectable 40 milliGRAM(s) IV Push daily    AC/ proph: heparin   Injectable 5000 Unit(s) SubCutaneous every 8 hours    ABx: cefepime   IVPB 1000 milliGRAM(s) IV Intermittent every 12 hours  metroNIDAZOLE  IVPB 500 milliGRAM(s) IV Intermittent every 8 hours    LABS  Labs:  CAPILLARY BLOOD GLUCOSE      POCT Blood Glucose.: 138 mg/dL (16 Feb 2021 07:04)  POCT Blood Glucose.: 125 mg/dL (16 Feb 2021 05:53)  POCT Blood Glucose.: 145 mg/dL (16 Feb 2021 04:16)  POCT Blood Glucose.: 127 mg/dL (16 Feb 2021 03:01)  POCT Blood Glucose.: 147 mg/dL (16 Feb 2021 01:36)  POCT Blood Glucose.: 164 mg/dL (16 Feb 2021 00:18)  POCT Blood Glucose.: 199 mg/dL (15 Feb 2021 23:22)  POCT Blood Glucose.: 186 mg/dL (15 Feb 2021 21:54)  POCT Blood Glucose.: 187 mg/dL (15 Feb 2021 20:08)  POCT Blood Glucose.: 125 mg/dL (15 Feb 2021 19:05)  POCT Blood Glucose.: 124 mg/dL (15 Feb 2021 17:08)  POCT Blood Glucose.: 119 mg/dL (15 Feb 2021 16:10)  POCT Blood Glucose.: 131 mg/dL (15 Feb 2021 15:29)  POCT Blood Glucose.: 173 mg/dL (15 Feb 2021 13:19)  POCT Blood Glucose.: 191 mg/dL (15 Feb 2021 11:10)                          9.3    20.37 )-----------( 290      ( 16 Feb 2021 04:14 )             27.0         02-16    137  |  99  |  26<H>  ----------------------------<  119<H>  5.1<H>   |  29  |  0.8      Calcium, Total Serum: 8.2 mg/dL (02-16-21 @ 04:14)      LFTs:       ABG - ( 11 Feb 2021 19:32 )  pH: 7.42  /  pCO2: 26    /  pO2: 71    / HCO3: 17    / Base Excess: -6.8  /  SaO2: 95              ABG - ( 11 Feb 2021 17:25 )  pH: 7.44  /  pCO2: 25    /  pO2: 140   / HCO3: 17    / Base Excess: -6.2  /  SaO2: 98              ABG - ( 11 Feb 2021 15:06 )  pH: 7.41  /  pCO2: 28    /  pO2: 147   / HCO3: 18    / Base Excess: -5.7  /  SaO2: 99            Coags:     10.80  ----< 0.94    ( 16 Feb 2021 04:14 )     28.6

## 2021-02-16 NOTE — PROGRESS NOTE ADULT - SUBJECTIVE AND OBJECTIVE BOX
MELISSA CARUSO  [id number]  55y Female    Indication for ICU admission: electrolyte derangements i/s/o extensive small bowel resection  Admit Date: 2/10/21  ICU Date: 2/11/21, 2/13/21  OR Date: 2/11/21    Allergies:   No Known Allergies    PAST MEDICAL & SURGICAL HISTORY:  Marijuana use, continuous  Hypertension  Diabetes  No pertinent past medical history  S/P tubal ligation 20 years ago    Home Medications:  ATENOLOL  100 MG TABS:  (11 Feb 2021 02:18)  ATORVASTATIN 40MG TABLETS: TAKE 1 TABLET BY MOUTH EVERY NIGHT AT BEDTIME (11 Feb 2021 02:18)  predniSONE: orally once a day (11 Feb 2021 02:18)  Robaxin 500 mg oral tablet: orally 2 times a day (11 Feb 2021 02:18)    24HRS EVENT:  DAY:  -Start 30 of lantus tonight  -Restart home labetalol  -NGT d/c'd 2/15  - 15mg Toradal q 6 for 3 doses  - f/u BMP at 2000    NIGHT:   WV alarming d/t blockage, but still holding suction, primary team notified         DVT PTX: HSQ  GI PTX: pantoprazole  Injectable 40 milliGRAM(s) IV Push daily    REVIEW OF SYSTEMS  [X] A ten-point review of systems was otherwise negative except as noted.  [ ] Due to altered mental status/intubation, subjective information were not able to be obtained from the patient. History was obtained, to the extent possible, from review of the chart and collateral sources of information.   MELISSA CARUSO  [id number]  55y Female    Indication for ICU admission: electrolyte derangements i/s/o extensive small bowel resection  Admit Date: 2/10/21  ICU Date: 21, 21  OR Date: 21    Allergies:   No Known Allergies    PAST MEDICAL & SURGICAL HISTORY:  Marijuana use, continuous  Hypertension  Diabetes  No pertinent past medical history  S/P tubal ligation 20 years ago    Home Medications:  ATENOLOL  100 MG TABS:  (2021 02:18)  ATORVASTATIN 40MG TABLETS: TAKE 1 TABLET BY MOUTH EVERY NIGHT AT BEDTIME (2021 02:18)  predniSONE: orally once a day (2021 02:18)  Robaxin 500 mg oral tablet: orally 2 times a day (2021 02:18)    24HRS EVENT:  DAY:  -Start 30 of lantus tonight  -Restart home labetalol  -NGT d/c'd 2/15  - 15mg Toradal q 6 for 3 doses  - f/u BMP at     NIGHT:   WV alarming d/t blockage, but still holding suction, primary team notified         DVT PTX: HSQ  GI PTX: pantoprazole  Injectable 40 milliGRAM(s) IV Push daily    REVIEW OF SYSTEMS  [X] A ten-point review of systems was otherwise negative except as noted.  [ ] Due to altered mental status/intubation, subjective information were not able to be obtained from the patient. History was obtained, to the extent possible, from review of the chart and collateral sources of information.    HD: 6d  Daily     Daily Weight in k.7 (15 Feb 2021 06:00)    Diet, NPO:   With Ice Chips/Sips of Water (21 @ 10:14)      CURRENT MEDS:  Neurologic Medications  ketorolac   Injectable 15 milliGRAM(s) IV Push every 6 hours PRN Severe Pain (7 - 10)  morphine  - Injectable 2 milliGRAM(s) IV Push every 6 hours PRN Moderate Pain (4 - 6)    Respiratory Medications    Cardiovascular Medications  labetalol Injectable 10 milliGRAM(s) IV Push every 4 hours PRN SBP >170    Gastrointestinal Medications  pantoprazole  Injectable 40 milliGRAM(s) IV Push daily  Parenteral Nutrition - Adult 1 Each TPN Continuous <Continuous>    Genitourinary Medications    Hematologic/Oncologic Medications  heparin   Injectable 5000 Unit(s) SubCutaneous every 8 hours  influenza   Vaccine 0.5 milliLiter(s) IntraMuscular once    Antimicrobial/Immunologic Medications  cefepime   IVPB 1000 milliGRAM(s) IV Intermittent every 12 hours  metroNIDAZOLE  IVPB 500 milliGRAM(s) IV Intermittent every 8 hours    Endocrine/Metabolic Medications  insulin glargine Injectable (LANTUS) 30 Unit(s) SubCutaneous at bedtime  insulin regular Infusion 2 Unit(s)/Hr IV Continuous <Continuous>    Topical/Other Medications  chlorhexidine 4% Liquid 1 Application(s) Topical daily      ICU Vital Signs Last 24 Hrs  T(C): 36.6 (15 Feb 2021 23:34), Max: 37.3 (15 Feb 2021 20:00)  T(F): 97.9 (15 Feb 2021 23:34), Max: 99.1 (15 Feb 2021 20:00)  HR: 74 (2021 02:30) (67 - 83)  BP: 126/58 (2021 02:30) (123/60 - 185/84)  BP(mean): 83 (2021 02:30) (83 - 121)  ABP: --  ABP(mean): --  RR: 14 (2021 02:30) (14 - 27)  SpO2: 100% (2021 02:30) (97% - 100%)      Adult Advanced Hemodynamics Last 24 Hrs  CVP(mm Hg): --  CVP(cm H2O): --  CO: --  CI: --  PA: --  PA(mean): --  PCWP: --  SVR: --  SVRI: --  PVR: --  PVRI: --          I&O's Summary    2021 07:01  -  15 Feb 2021 07:00  --------------------------------------------------------  IN: 3040.2 mL / OUT: 3550 mL / NET: -509.8 mL    15 Feb 2021 07:01  -  2021 04:47  --------------------------------------------------------  IN: 2588.5 mL / OUT: 2350 mL / NET: 238.5 mL      I&O's Detail    2021 07:01  -  15 Feb 2021 07:00  --------------------------------------------------------  IN:    Fat Emulsion (Fish Oil &amp; Plant Based) 20% Infusion: 281.7 mL    Insulin: 76 mL    IV PiggyBack: 100 mL    IV PiggyBack: 200 mL    IV PiggyBack: 150 mL    IV PiggyBack: 312.5 mL    TPN (Total Parenteral Nutrition): 1920 mL  Total IN: 3040.2 mL    OUT:    Nasogastric/Oral tube (mL): 100 mL    VAC (Vacuum Assisted Closure) System (mL): 50 mL    Voided (mL): 3400 mL  Total OUT: 3550 mL    Total NET: -509.8 mL      15 Feb 2021 07:01  -  2021 04:47  --------------------------------------------------------  IN:    Fat Emulsion (Fish Oil &amp; Plant Based) 20% Infusion: 248 mL    Insulin: 99 mL    IV PiggyBack: 300 mL    IV PiggyBack: 251.5 mL    IV PiggyBack: 50 mL    IV PiggyBack: 200 mL    TPN (Total Parenteral Nutrition): 1440 mL  Total IN: 2588.5 mL    OUT:    VAC (Vacuum Assisted Closure) System (mL): 450 mL    Voided (mL): 1900 mL  Total OUT: 2350 mL    Total NET: 238.5 mL          EXAM:  NEURO: NAD, alert, oriented x 3, no focal deficits.  PERRLA     RESPIRATORY: Lungs clear to auscultation, Normal expansion/effort.      CARDIOVASCULAR: Regular rate and rhythm. No peripheral edema.    GI: Abdomen soft, Non-tender, Non-distended.  Midline wound vac in place and holding suction. Output s/s.    EXTREMITIES: Extremities warm, pink, well-perfused.      DERM: Good skin turgor, no skin breakdown.      :  Tavarez catheter in place.     CXR:     LABS:  Labs:  CAPILLARY BLOOD GLUCOSE      POCT Blood Glucose.: 145 mg/dL (2021 04:16)  POCT Blood Glucose.: 127 mg/dL (2021 03:01)  POCT Blood Glucose.: 147 mg/dL (2021 01:36)  POCT Blood Glucose.: 164 mg/dL (2021 00:18)  POCT Blood Glucose.: 199 mg/dL (15 Feb 2021 23:22)  POCT Blood Glucose.: 186 mg/dL (15 Feb 2021 21:54)  POCT Blood Glucose.: 187 mg/dL (15 Feb 2021 20:08)  POCT Blood Glucose.: 125 mg/dL (15 Feb 2021 19:05)  POCT Blood Glucose.: 124 mg/dL (15 Feb 2021 17:08)  POCT Blood Glucose.: 119 mg/dL (15 Feb 2021 16:10)  POCT Blood Glucose.: 131 mg/dL (15 Feb 2021 15:29)  POCT Blood Glucose.: 173 mg/dL (15 Feb 2021 13:19)  POCT Blood Glucose.: 191 mg/dL (15 Feb 2021 11:10)  POCT Blood Glucose.: 193 mg/dL (15 Feb 2021 09:14)  POCT Blood Glucose.: 150 mg/dL (15 Feb 2021 06:03)                          8.8    13.44 )-----------( 234      ( 15 Feb 2021 05:34 )             25.2         02-15    138  |  98  |  21<H>  ----------------------------<  144<H>  4.6   |  31  |  0.6<L>      Calcium, Total Serum: 6.9 mg/dL (02-15-21 @ 20:04)      LFTs:       ABG - ( 2021 19:32 )  pH: 7.42  /  pCO2: 26    /  pO2: 71    / HCO3: 17    / Base Excess: -6.8  /  SaO2: 95              ABG - ( 2021 17:25 )  pH: 7.44  /  pCO2: 25    /  pO2: 140   / HCO3: 17    / Base Excess: -6.2  /  SaO2: 98              ABG - ( 2021 15:06 )  pH: 7.41  /  pCO2: 28    /  pO2: 147   / HCO3: 18    / Base Excess: -5.7  /  SaO2: 99                Coags:

## 2021-02-16 NOTE — PROGRESS NOTE ADULT - ATTENDING COMMENTS
I personally examined this patient with the PA and resident and discussed my plan with them.    pt remains hyperglycemic, off insulin gtt after starting lantus  metabolically stable  leukocytosis, afebrile, awaiting repeat cbc with diff  cefipime flagyl  downgrade to floor

## 2021-02-16 NOTE — PROGRESS NOTE ADULT - ASSESSMENT
55 years old female admitted due to incarcerated umbilical hernia, POD#5, S/P Exploratory laparotomy, small bowel resection, right hemicolectomy. Currently patient stable, on NPO, NGT removed yesterday, TPN at 80 ml/h, pain controlled, had 2 bowel movements yesterday, VAC working no leaks. With the above physical exam and lab results.    Plan  - NPO w/ Ice chips and sips of water  - C/w TPN   - Monitor Vital signs  - Pain control as needed  - CBC, BMP, and electrolytes, replete as needed  - GI and DVT prophylaxis  - Continue current management 55 years old female admitted due to incarcerated umbilical hernia, POD#5, S/P Exploratory laparotomy, small bowel resection, right hemicolectomy. Currently patient stable, on NPO, NGT removed yesterday, TPN at 80 ml/h, pain controlled, had 2 bowel movements yesterday, VAC working no leaks. With the above physical exam and lab results.    Plan  - NPO w/ Ice chips and sips of water  - Patient can have PO medications  - C/w TPN   - Monitor Vital signs  - Pain control as needed  - CBC with diff, UA, CXR  - Duplex US LE  - GI and DVT prophylaxis-   - Continue current management

## 2021-02-16 NOTE — CHART NOTE - NSCHARTNOTEFT_GEN_A_CORE
SICU PA Transfer Note:    Transfer from: SICU  Transfer to:  (  x) Surgery    (  ) Telemetry    (  ) Medicine    (  ) Palliative    (  ) Stroke Unit    (  ) _______________      SICU COURSE:  55y Female with PMHx of HTN, HLD, DM 2, who presented with abdominal pain from a chronic umbilical hernia that she has had for 20 years since her tubal ligation surgery. Pain associated with nausea/vomiting for 3 days. CT abdomen/pelvis done in the ED which showed ventral hernia containing distal small bowel loops, the appendix, and the right colon from the cecum to the level of the transverse colon. There was proximal small bowel distension consistent with SBO, and reduced bowel wall enhancement consistent with ischemia. Lactate was 4.7. Patient was taken emergently to the OR where 160 cm of ischemic terminal ileum and ascending colon were resected, with primary anastomosis.  Fascia closed and wound vac placed. Per report, she was hypotensive intraop requiring pushes of neosynephrine. Patient remained intubated post operatively.     Postoperatively patient was tachycardic, bolused 500cc which subsequently resolved. Cardiac enzymes were positive but have since been downtrending. Patient passed SBT in the afternoon on POD:1 and was extubated with no complications. Patient's NGT remains to low continuous suction and has put out 1.3L/24hrs. While NPO patient will continue to receive IV fluids LR@125. Urine output has been 80-100cc/hour with resolving DERRICK (68/2.2). Patient will continue Cefepime and flagyl at this time. Patient's pain has been controlled with IV tylenol and has been doing well s/p extubation with no complications. Patient is vitally stable with no complaints.                              Pt was downgraded from the SICU on 2/12, started on TPN 2/12, and found to be hypokalemic to 2.8, hyperglycemic to >200, and hypophosphatemic to 0.7, c/f refeeding syndrome, was re-up graded to SICU for severe electrolyte derangements.    All the electrolyte electolyte abnormalities have been resolved.  FS now has been controlled, was started on Lantus 30u qhs and tight RISS, off Insulin gtt since 6am this morning.  Pt also had bm, as per primary team ok to have po meds, however, hold off on clears, continue TPN for now.  Pt is hemodynamically stable for downgrade to the floor.       PAST MEDICAL & SURGICAL HISTORY:  Marijuana use, continuous    Hypertension    Diabetes    No pertinent past medical history    S/P tubal ligation  20 years ago      Allergies    No Known Allergies    Intolerances      MEDICATIONS  (STANDING):  acetaminophen   Tablet .. 650 milliGRAM(s) Oral every 6 hours  ATENolol  Tablet 100 milliGRAM(s) Oral daily  atorvastatin 40 milliGRAM(s) Oral at bedtime  cefepime   IVPB 2000 milliGRAM(s) IV Intermittent every 12 hours  chlorhexidine 4% Liquid 1 Application(s) Topical daily  dextrose 40% Gel 15 Gram(s) Oral once  dextrose 5%. 1000 milliLiter(s) (50 mL/Hr) IV Continuous <Continuous>  dextrose 5%. 1000 milliLiter(s) (100 mL/Hr) IV Continuous <Continuous>  dextrose 50% Injectable 25 Gram(s) IV Push once  dextrose 50% Injectable 12.5 Gram(s) IV Push once  dextrose 50% Injectable 25 Gram(s) IV Push once  glucagon  Injectable 1 milliGRAM(s) IntraMuscular once  heparin   Injectable 5000 Unit(s) SubCutaneous every 8 hours  influenza   Vaccine 0.5 milliLiter(s) IntraMuscular once  insulin glargine Injectable (LANTUS) 30 Unit(s) SubCutaneous at bedtime  insulin regular  human corrective regimen sliding scale   SubCutaneous every 6 hours  metroNIDAZOLE  IVPB 500 milliGRAM(s) IV Intermittent every 8 hours  pantoprazole    Tablet 40 milliGRAM(s) Oral daily  Parenteral Nutrition - Adult 1 Each (80 mL/Hr) TPN Continuous <Continuous>    MEDICATIONS  (PRN):  oxyCODONE    IR 5 milliGRAM(s) Oral every 6 hours PRN Moderate Pain (4 - 6)      Vital Signs Last 24 Hrs  T(C): 36.3 (16 Feb 2021 07:45), Max: 37.3 (15 Feb 2021 20:00)  T(F): 97.4 (16 Feb 2021 07:45), Max: 99.1 (15 Feb 2021 20:00)  HR: 78 (16 Feb 2021 10:00) (70 - 83)  BP: 150/65 (16 Feb 2021 09:00) (123/60 - 182/83)  BP(mean): 93 (16 Feb 2021 09:00) (83 - 119)  RR: 28 (16 Feb 2021 10:00) (14 - 28)  SpO2: 100% (16 Feb 2021 10:00) (97% - 100%)  I&O's Summary    15 Feb 2021 07:01  -  16 Feb 2021 07:00  --------------------------------------------------------  IN: 3043.5 mL / OUT: 2400 mL / NET: 643.5 mL    16 Feb 2021 07:01  -  16 Feb 2021 12:34  --------------------------------------------------------  IN: 390 mL / OUT: 200 mL / NET: 190 mL        LABS                                            9.0                   Neurophils% (auto):   x      (02-16 @ 11:15):    22.18)-----------(335          Lymphocytes% (auto):  x                                             27.1                   Eosinphils% (auto):   x        Manual%: Neutrophils x    ; Lymphocytes x    ; Eosinophils x    ; Bands%: x    ; Blasts x                                    137    |  99     |  26                  Calcium: 8.2   / iCa: x      (02-16 @ 04:14)    ----------------------------<  119       Magnesium: 2.5                              5.1     |  29     |  0.8              Phosphorous: 3.6        ( 02-16 @ 04:14 )   PT: 10.80 sec;   INR: 0.94 ratio  aPTT: 28.6 sec        Assessment & Plan  55F w/ PMH of HTN, HLD, and DM 2 admitted for strangulated ventral hernia s/p small bowel resection (165cm) and right hemicolectomy with primary anastomosis. SICU consulted for management of severe electrolyte disturbances.     NEURO:  -Pain: changed to Tylenol ATC and oxy prn   -d/c'd Morphine     RESP:  -Extubated 2/11, currently saturating well on RA  -Activity: ambulate as tolerated, pt was OOBTC today  -AM CXR unchanged    CARDS:   -PMH of HTN on Atenolol at home, restarted it   -d/c'd IV labetalol   -Post-operatively trops were elevated to 0.1, attributed to demand ischemia, no longer trending  -EKG 2/13: normal sinus rhythm,   -ECHO 2/11: EF 63%, G1DD, moderate RVE, mild TR/MR    GI/NUTR:  -Strangulated ventral hernia s/p small bowel resection (165cm) and right hemicolectomy with primary anastomosis  -NGT d/c'd 2/15 AM  -Wound vac in place with serosanguinous output   -Diet: NPO w/ ice chips, cont TPN, ok to start po meds, + BM  -GI PPX: PTX  -Bowel regimen: holding    /RENAL:   -Strict I/Os: primafit in place  -UOP: adequate  -BUN/Cr- 39/1.1  -->,  33/0.9  -->,  31/0.9  -->   31/0.6 ->26/0.8  -K5.1, Mg 2.5, IP 3.6  -DERRICK resolved    HEME/ONC:   - DVT PPX: HSQ  - Hb 9.3  -DVT sono ordered today    ID:  - Antibiotics: increased cefepime to 2g q12h (DERRICK resolved), flagyl 500mg Q8H  - WBC: 12.8  >  13.4->20->22  - Afebrile  -f/up stat UA  -f/up blood culture    ENDO:  PMH of DM on metformin, lantus 33U QHS, and trulicity 75 mg Qweek at home- Q4H FS  - Insulin gtt off since 6AM  - started Lantus 30u qhs on 2/15, tight RISS  HbA1c 6.2 (2/13)    LINES/DRAINS: PIV, L PICC    DISPO: downgrade to floor SICU PA Transfer Note:    Transfer from: SICU  Transfer to:  (  x) Surgery    (  ) Telemetry    (  ) Medicine    (  ) Palliative    (  ) Stroke Unit    (  ) _______________      SICU COURSE:  55y Female with PMHx of HTN, HLD, DM 2, who presented with abdominal pain from a chronic umbilical hernia that she has had for 20 years since her tubal ligation surgery. Pain associated with nausea/vomiting for 3 days. CT abdomen/pelvis done in the ED which showed ventral hernia containing distal small bowel loops, the appendix, and the right colon from the cecum to the level of the transverse colon. There was proximal small bowel distension consistent with SBO, and reduced bowel wall enhancement consistent with ischemia. Lactate was 4.7. Patient was taken emergently to the OR where 160 cm of ischemic terminal ileum and ascending colon were resected, with primary anastomosis.  Fascia closed and wound vac placed. Per report, she was hypotensive intraop requiring pushes of neosynephrine. Patient remained intubated post operatively.     Postoperatively patient was tachycardic, bolused 500cc which subsequently resolved. Cardiac enzymes were positive but have since been downtrending. Patient passed SBT in the afternoon on POD:1 and was extubated with no complications. Patient's NGT remains to low continuous suction and has put out 1.3L/24hrs. While NPO patient will continue to receive IV fluids LR@125. Urine output has been 80-100cc/hour with resolving DERRICK (68/2.2). Patient will continue Cefepime and flagyl at this time. Patient's pain has been controlled with IV tylenol and has been doing well s/p extubation with no complications. Patient is vitally stable with no complaints.                              Pt was downgraded from the SICU on 2/12, started on TPN 2/12, and found to be hypokalemic to 2.8, hyperglycemic to >200, and hypophosphatemic to 0.7, c/f refeeding syndrome, was re-up graded to SICU for severe electrolyte derangements.    All the electrolyte electolyte abnormalities have been resolved.  FS now has been controlled, was started on Lantus 30u qhs and tight RISS, off Insulin gtt since 6am this morning.  Pt also had bm, as per primary team ok to have po meds, however, hold off on clears, continue TPN for now.  Pt is hemodynamically stable for downgrade to the floor.       PAST MEDICAL & SURGICAL HISTORY:  Marijuana use, continuous    Hypertension    Diabetes    No pertinent past medical history    S/P tubal ligation  20 years ago      Allergies    No Known Allergies    Intolerances      MEDICATIONS  (STANDING):  acetaminophen   Tablet .. 650 milliGRAM(s) Oral every 6 hours  ATENolol  Tablet 100 milliGRAM(s) Oral daily  atorvastatin 40 milliGRAM(s) Oral at bedtime  cefepime   IVPB 2000 milliGRAM(s) IV Intermittent every 12 hours  chlorhexidine 4% Liquid 1 Application(s) Topical daily  dextrose 40% Gel 15 Gram(s) Oral once  dextrose 5%. 1000 milliLiter(s) (50 mL/Hr) IV Continuous <Continuous>  dextrose 5%. 1000 milliLiter(s) (100 mL/Hr) IV Continuous <Continuous>  dextrose 50% Injectable 25 Gram(s) IV Push once  dextrose 50% Injectable 12.5 Gram(s) IV Push once  dextrose 50% Injectable 25 Gram(s) IV Push once  glucagon  Injectable 1 milliGRAM(s) IntraMuscular once  heparin   Injectable 5000 Unit(s) SubCutaneous every 8 hours  influenza   Vaccine 0.5 milliLiter(s) IntraMuscular once  insulin glargine Injectable (LANTUS) 30 Unit(s) SubCutaneous at bedtime  insulin regular  human corrective regimen sliding scale   SubCutaneous every 6 hours  metroNIDAZOLE  IVPB 500 milliGRAM(s) IV Intermittent every 8 hours  pantoprazole    Tablet 40 milliGRAM(s) Oral daily  Parenteral Nutrition - Adult 1 Each (80 mL/Hr) TPN Continuous <Continuous>    MEDICATIONS  (PRN):  oxyCODONE    IR 5 milliGRAM(s) Oral every 6 hours PRN Moderate Pain (4 - 6)      Vital Signs Last 24 Hrs  T(C): 36.3 (16 Feb 2021 07:45), Max: 37.3 (15 Feb 2021 20:00)  T(F): 97.4 (16 Feb 2021 07:45), Max: 99.1 (15 Feb 2021 20:00)  HR: 78 (16 Feb 2021 10:00) (70 - 83)  BP: 150/65 (16 Feb 2021 09:00) (123/60 - 182/83)  BP(mean): 93 (16 Feb 2021 09:00) (83 - 119)  RR: 28 (16 Feb 2021 10:00) (14 - 28)  SpO2: 100% (16 Feb 2021 10:00) (97% - 100%)  I&O's Summary    15 Feb 2021 07:01  -  16 Feb 2021 07:00  --------------------------------------------------------  IN: 3043.5 mL / OUT: 2400 mL / NET: 643.5 mL    16 Feb 2021 07:01  -  16 Feb 2021 12:34  --------------------------------------------------------  IN: 390 mL / OUT: 200 mL / NET: 190 mL        LABS                                            9.0                   Neurophils% (auto):   x      (02-16 @ 11:15):    22.18)-----------(335          Lymphocytes% (auto):  x                                             27.1                   Eosinphils% (auto):   x        Manual%: Neutrophils x    ; Lymphocytes x    ; Eosinophils x    ; Bands%: x    ; Blasts x                                    137    |  99     |  26                  Calcium: 8.2   / iCa: x      (02-16 @ 04:14)    ----------------------------<  119       Magnesium: 2.5                              5.1     |  29     |  0.8              Phosphorous: 3.6        ( 02-16 @ 04:14 )   PT: 10.80 sec;   INR: 0.94 ratio  aPTT: 28.6 sec        Assessment & Plan  55F w/ PMH of HTN, HLD, and DM 2 admitted for strangulated ventral hernia s/p small bowel resection (165cm) and right hemicolectomy with primary anastomosis. SICU consulted for management of severe electrolyte disturbances.     NEURO:  -Pain: changed to Tylenol ATC and oxy prn   -d/c'd Morphine     RESP:  -Extubated 2/11, currently saturating well on RA  -Activity: ambulate as tolerated, pt was OOBTC today  -AM CXR unchanged    CARDS:   -PMH of HTN on Atenolol at home, restarted it   -statin resumed  -d/c'd IV labetalol   -Post-operatively trops were elevated to 0.1, attributed to demand ischemia, no longer trending  -EKG 2/13: normal sinus rhythm,   -ECHO 2/11: EF 63%, G1DD, moderate RVE, mild TR/MR    GI/NUTR:  -Strangulated ventral hernia s/p small bowel resection (165cm) and right hemicolectomy with primary anastomosis  -NGT d/c'd 2/15 AM  -Wound vac in place with serosanguinous output   -Diet: NPO w/ ice chips, cont TPN, ok to start po meds, + BM  -GI PPX: PTX  -Bowel regimen: holding    /RENAL:   -Strict I/Os: primafit in place  -UOP: adequate  -BUN/Cr- 39/1.1  -->,  33/0.9  -->,  31/0.9  -->   31/0.6 ->26/0.8  -K 5.1, Mg 2.5, IP 3.6  -DERRICK resolved    HEME/ONC:   - DVT PPX: HSQ  - Hb 9.3  -DVT sono ordered today    ID:  - Antibiotics: increased cefepime to 2g q12h (DERRICK resolved), flagyl 500mg Q8H  - WBC: 12.8  > 13.4->20->22  - Afebrile  -f/up stat UA  -f/up blood culture 1600    ENDO:  PMH of DM on metformin, lantus 33U QHS, and trulicity 75 mg Qweek at home- Q4H FS  - Insulin gtt off since 6AM  - started Lantus 30u qhs on 2/15, tight RISS  HbA1c 6.2 (2/13)    LINES/DRAINS: PIV, L PICC    DISPO: downgrade to floor  -Full signout given to LA Lambert from Blue team on 2/16 @ 1:07pm

## 2021-02-16 NOTE — PROGRESS NOTE ADULT - ASSESSMENT
Assessment & Plan  55F w/ PMH of HTN, HLD, and DM 2 admitted for strangulated ventral hernia s/p small bowel resection (165cm) and right hemicolectomy with primary anastomosis. SICU consulted for management of severe electrolyte disturbances.     NEURO:  -Pain: morphine PRN, 15mg Toradal q 6 for 3 doses    RESP:  -Extubated 2/11, currently saturating well on RA  -Activity: ambulate as tolerated, pt was OOBTC today  -AM CXR unchanged    CARDS:   -PMH of HTN on metoprolol at home  -Currently on labetalol 10 q4  -Post-operatively trops were elevated to 0.1, attributed to demand ischemia, no longer trending  -EKG 2/13: normal sinus rhythm,   -ECHO 2/11: EF 63%, G1DD, moderate RVE, mild TR/MR    GI/NUTR:  -Strangulated ventral hernia s/p small bowel resection (165cm) and right hemicolectomy with primary anastomosis  -NGT d/c'd 2/15 AM  -Wound vac in place with serosanguinous output (last changed ___)  -Diet: NPO w/ ice chips, TPN  --- plan to advance to [facility] om 2/16  -C/w TPN, goal 80  -GI PPX: PTX  -Bowel regimen: holding    /RENAL:   -Strict I/Os: primafit in place  -UOP:   -BUN/Cr- 39/1.1  -->,  33/0.9  -->,  31/0.9  -->   31/0.6  -Electrolyte derangements: hypokalemia, hypophosphoremia, hyperglycemia  - 2/12 23:30 - K 2.8, Mg 1.9, Phos 2.9  - 2/13 05:41 - K 2.7, Mg 1.9, Phos 2.1  - 07:34 - 20 KCl x 2 = 40 mEq K  - 07:51 - , 6U lispro  - 11:00 20 K = 20 mEq K  - 12:15 - 1g Mg,  --> 6U lispro  - 16:37 -  --> 9U lispro  - 17:56 - 2U lispro, 2g Mg  - 18:15 - K 2.7, Mg 2.4, Phos 0.7  - 21:26 - 30 KPhos, 20 K x 2 = 45 + 40mEq = 85 mEq K  - 2/14 00:49 Na 134, K 3.3, Mg 1.9, Phos 1.6  - 01:49 20 K = 20 mEq  - @1528: K+ 3.9, Mg 1.8, Phos 2.9  - 2/15:       Na 139, K 4.0, Mag 1.8, Phos 2.5  ---- repleted    HEME/ONC:   - DVT PPX: HSQ  - Hb 8.8    ID:  - Antibiotics: cefepime 1000mg Q12H, flagyl 500mg Q8H  - WBC: 12.8  >  13.4  - Afebrile    ENDO:  PMH of DM on metformin, lantus 33U QHS, and trulicity 75 mg Qweek at home- Q4H FS  Permissive hyperglycemia to 200s-300s  - Insulin gtt started for persistent hyperglycemia  - Plan to restart home lantus regimen  HbA1c 6.2 (2/13)    LINES/DRAINS: PIV, L PICC  DISPO: SICU

## 2021-02-17 LAB
ALBUMIN SERPL ELPH-MCNC: 2.9 G/DL — LOW (ref 3.5–5.2)
ALP SERPL-CCNC: 138 U/L — HIGH (ref 30–115)
ALP SERPL-CCNC: 144 U/L — HIGH (ref 30–115)
ALT FLD-CCNC: 35 U/L — SIGNIFICANT CHANGE UP (ref 0–41)
ALT FLD-CCNC: 39 U/L — SIGNIFICANT CHANGE UP (ref 0–41)
AMYLASE P1 CFR SERPL: 121 U/L — HIGH (ref 25–115)
ANION GAP SERPL CALC-SCNC: 9 MMOL/L — SIGNIFICANT CHANGE UP (ref 7–14)
ANISOCYTOSIS BLD QL: SLIGHT — SIGNIFICANT CHANGE UP
APPEARANCE UR: CLEAR — SIGNIFICANT CHANGE UP
APTT BLD: 31.7 SEC — SIGNIFICANT CHANGE UP (ref 27–39.2)
APTT BLD: 34.1 SEC — SIGNIFICANT CHANGE UP (ref 27–39.2)
AST SERPL-CCNC: 31 U/L — SIGNIFICANT CHANGE UP (ref 0–41)
AST SERPL-CCNC: 36 U/L — SIGNIFICANT CHANGE UP (ref 0–41)
BASOPHILS # BLD AUTO: 0.12 K/UL — SIGNIFICANT CHANGE UP (ref 0–0.2)
BASOPHILS # BLD AUTO: 0.25 K/UL — HIGH (ref 0–0.2)
BASOPHILS NFR BLD AUTO: 0.4 % — SIGNIFICANT CHANGE UP (ref 0–1)
BASOPHILS NFR BLD AUTO: 1 % — SIGNIFICANT CHANGE UP (ref 0–1)
BILIRUB DIRECT SERPL-MCNC: 0.2 MG/DL — SIGNIFICANT CHANGE UP (ref 0–0.2)
BILIRUB DIRECT SERPL-MCNC: 0.2 MG/DL — SIGNIFICANT CHANGE UP (ref 0–0.2)
BILIRUB INDIRECT FLD-MCNC: 0.3 MG/DL — SIGNIFICANT CHANGE UP (ref 0.2–1.2)
BILIRUB INDIRECT FLD-MCNC: 0.3 MG/DL — SIGNIFICANT CHANGE UP (ref 0.2–1.2)
BILIRUB SERPL-MCNC: 0.5 MG/DL — SIGNIFICANT CHANGE UP (ref 0.2–1.2)
BILIRUB SERPL-MCNC: 0.5 MG/DL — SIGNIFICANT CHANGE UP (ref 0.2–1.2)
BILIRUB UR-MCNC: NEGATIVE — SIGNIFICANT CHANGE UP
BUN SERPL-MCNC: 31 MG/DL — HIGH (ref 10–20)
CALCIUM SERPL-MCNC: 8.2 MG/DL — LOW (ref 8.5–10.1)
CHLORIDE SERPL-SCNC: 102 MMOL/L — SIGNIFICANT CHANGE UP (ref 98–110)
CO2 SERPL-SCNC: 27 MMOL/L — SIGNIFICANT CHANGE UP (ref 17–32)
COLOR SPEC: SIGNIFICANT CHANGE UP
CREAT SERPL-MCNC: 0.8 MG/DL — SIGNIFICANT CHANGE UP (ref 0.7–1.5)
DIFF PNL FLD: NEGATIVE — SIGNIFICANT CHANGE UP
EOSINOPHIL # BLD AUTO: 1.06 K/UL — HIGH (ref 0–0.7)
EOSINOPHIL # BLD AUTO: 1.75 K/UL — HIGH (ref 0–0.7)
EOSINOPHIL NFR BLD AUTO: 3.8 % — SIGNIFICANT CHANGE UP (ref 0–8)
EOSINOPHIL NFR BLD AUTO: 7.1 % — SIGNIFICANT CHANGE UP (ref 0–8)
GIANT PLATELETS BLD QL SMEAR: PRESENT — SIGNIFICANT CHANGE UP
GLUCOSE BLDC GLUCOMTR-MCNC: 126 MG/DL — HIGH (ref 70–99)
GLUCOSE BLDC GLUCOMTR-MCNC: 135 MG/DL — HIGH (ref 70–99)
GLUCOSE BLDC GLUCOMTR-MCNC: 141 MG/DL — HIGH (ref 70–99)
GLUCOSE BLDC GLUCOMTR-MCNC: 142 MG/DL — HIGH (ref 70–99)
GLUCOSE BLDC GLUCOMTR-MCNC: 172 MG/DL — HIGH (ref 70–99)
GLUCOSE SERPL-MCNC: 152 MG/DL — HIGH (ref 70–99)
GLUCOSE UR QL: NEGATIVE — SIGNIFICANT CHANGE UP
HCT VFR BLD CALC: 25.1 % — LOW (ref 37–47)
HCT VFR BLD CALC: 25.3 % — LOW (ref 37–47)
HGB BLD-MCNC: 8.4 G/DL — LOW (ref 12–16)
HGB BLD-MCNC: 8.7 G/DL — LOW (ref 12–16)
IMM GRANULOCYTES NFR BLD AUTO: 14.8 % — HIGH (ref 0.1–0.3)
INR BLD: 0.93 RATIO — SIGNIFICANT CHANGE UP (ref 0.65–1.3)
INR BLD: 1.02 RATIO — SIGNIFICANT CHANGE UP (ref 0.65–1.3)
KETONES UR-MCNC: NEGATIVE — SIGNIFICANT CHANGE UP
LEUKOCYTE ESTERASE UR-ACNC: NEGATIVE — SIGNIFICANT CHANGE UP
LIDOCAIN IGE QN: 169 U/L — HIGH (ref 7–60)
LYMPHOCYTES # BLD AUTO: 1.5 K/UL — SIGNIFICANT CHANGE UP (ref 1.2–3.4)
LYMPHOCYTES # BLD AUTO: 1.86 K/UL — SIGNIFICANT CHANGE UP (ref 1.2–3.4)
LYMPHOCYTES # BLD AUTO: 6.1 % — LOW (ref 20.5–51.1)
LYMPHOCYTES # BLD AUTO: 6.7 % — LOW (ref 20.5–51.1)
MAGNESIUM SERPL-MCNC: 1.7 MG/DL — LOW (ref 1.8–2.4)
MANUAL SMEAR VERIFICATION: SIGNIFICANT CHANGE UP
MCHC RBC-ENTMCNC: 30.3 PG — SIGNIFICANT CHANGE UP (ref 27–31)
MCHC RBC-ENTMCNC: 31.2 PG — HIGH (ref 27–31)
MCHC RBC-ENTMCNC: 33.2 G/DL — SIGNIFICANT CHANGE UP (ref 32–37)
MCHC RBC-ENTMCNC: 34.7 G/DL — SIGNIFICANT CHANGE UP (ref 32–37)
MCV RBC AUTO: 90 FL — SIGNIFICANT CHANGE UP (ref 81–99)
MCV RBC AUTO: 91.3 FL — SIGNIFICANT CHANGE UP (ref 81–99)
METAMYELOCYTES # FLD: 2 % — HIGH (ref 0–0)
MONOCYTES # BLD AUTO: 1.09 K/UL — HIGH (ref 0.1–0.6)
MONOCYTES # BLD AUTO: 1.75 K/UL — HIGH (ref 0.1–0.6)
MONOCYTES NFR BLD AUTO: 3.9 % — SIGNIFICANT CHANGE UP (ref 1.7–9.3)
MONOCYTES NFR BLD AUTO: 7.1 % — SIGNIFICANT CHANGE UP (ref 1.7–9.3)
MYELOCYTES NFR BLD: 5 % — HIGH (ref 0–0)
NEUTROPHILS # BLD AUTO: 17.41 K/UL — HIGH (ref 1.4–6.5)
NEUTROPHILS # BLD AUTO: 19.59 K/UL — HIGH (ref 1.4–6.5)
NEUTROPHILS NFR BLD AUTO: 70.4 % — SIGNIFICANT CHANGE UP (ref 42.2–75.2)
NEUTROPHILS NFR BLD AUTO: 70.7 % — SIGNIFICANT CHANGE UP (ref 42.2–75.2)
NITRITE UR-MCNC: NEGATIVE — SIGNIFICANT CHANGE UP
NRBC # BLD: 0 /100 WBCS — SIGNIFICANT CHANGE UP (ref 0–0)
PH UR: 6.5 — SIGNIFICANT CHANGE UP (ref 5–8)
PHOSPHATE SERPL-MCNC: 3.4 MG/DL — SIGNIFICANT CHANGE UP (ref 2.1–4.9)
PLAT MORPH BLD: NORMAL — SIGNIFICANT CHANGE UP
PLATELET # BLD AUTO: 344 K/UL — SIGNIFICANT CHANGE UP (ref 130–400)
PLATELET # BLD AUTO: 354 K/UL — SIGNIFICANT CHANGE UP (ref 130–400)
POLYCHROMASIA BLD QL SMEAR: SLIGHT — SIGNIFICANT CHANGE UP
POTASSIUM SERPL-MCNC: 4.3 MMOL/L — SIGNIFICANT CHANGE UP (ref 3.5–5)
POTASSIUM SERPL-SCNC: 4.3 MMOL/L — SIGNIFICANT CHANGE UP (ref 3.5–5)
PROCALCITONIN SERPL-MCNC: 1.22 NG/ML — HIGH (ref 0.02–0.1)
PROMYELOCYTES # FLD: 1 % — HIGH (ref 0–0)
PROT SERPL-MCNC: 5.1 G/DL — LOW (ref 6–8)
PROT UR-MCNC: SIGNIFICANT CHANGE UP
PROTHROM AB SERPL-ACNC: 10.7 SEC — SIGNIFICANT CHANGE UP (ref 9.95–12.87)
PROTHROM AB SERPL-ACNC: 11.7 SEC — SIGNIFICANT CHANGE UP (ref 9.95–12.87)
RBC # BLD: 2.77 M/UL — LOW (ref 4.2–5.4)
RBC # BLD: 2.79 M/UL — LOW (ref 4.2–5.4)
RBC # FLD: 14.2 % — SIGNIFICANT CHANGE UP (ref 11.5–14.5)
RBC # FLD: 14.3 % — SIGNIFICANT CHANGE UP (ref 11.5–14.5)
RBC BLD AUTO: ABNORMAL
SMUDGE CELLS # BLD: PRESENT — SIGNIFICANT CHANGE UP
SODIUM SERPL-SCNC: 138 MMOL/L — SIGNIFICANT CHANGE UP (ref 135–146)
SP GR SPEC: 1.01 — SIGNIFICANT CHANGE UP (ref 1.01–1.03)
SURGICAL PATHOLOGY STUDY: SIGNIFICANT CHANGE UP
UROBILINOGEN FLD QL: SIGNIFICANT CHANGE UP
WBC # BLD: 24.63 K/UL — HIGH (ref 4.8–10.8)
WBC # BLD: 27.85 K/UL — HIGH (ref 4.8–10.8)
WBC # FLD AUTO: 24.63 K/UL — HIGH (ref 4.8–10.8)
WBC # FLD AUTO: 27.85 K/UL — HIGH (ref 4.8–10.8)

## 2021-02-17 PROCEDURE — 74177 CT ABD & PELVIS W/CONTRAST: CPT | Mod: 26

## 2021-02-17 PROCEDURE — 71045 X-RAY EXAM CHEST 1 VIEW: CPT | Mod: 26

## 2021-02-17 PROCEDURE — 78226 HEPATOBILIARY SYSTEM IMAGING: CPT | Mod: 26

## 2021-02-17 RX ORDER — MORPHINE SULFATE 50 MG/1
2 CAPSULE, EXTENDED RELEASE ORAL ONCE
Refills: 0 | Status: DISCONTINUED | OUTPATIENT
Start: 2021-02-17 | End: 2021-02-17

## 2021-02-17 RX ORDER — LACTOBACILLUS ACIDOPHILUS 100MM CELL
1 CAPSULE ORAL DAILY
Refills: 0 | Status: DISCONTINUED | OUTPATIENT
Start: 2021-02-17 | End: 2021-02-22

## 2021-02-17 RX ORDER — IOHEXOL 300 MG/ML
30 INJECTION, SOLUTION INTRAVENOUS ONCE
Refills: 0 | Status: COMPLETED | OUTPATIENT
Start: 2021-02-17 | End: 2021-02-17

## 2021-02-17 RX ORDER — ELECTROLYTE SOLUTION,INJ
1 VIAL (ML) INTRAVENOUS
Refills: 0 | Status: DISCONTINUED | OUTPATIENT
Start: 2021-02-17 | End: 2021-02-18

## 2021-02-17 RX ORDER — SACCHAROMYCES BOULARDII 250 MG
250 POWDER IN PACKET (EA) ORAL
Refills: 0 | Status: DISCONTINUED | OUTPATIENT
Start: 2021-02-17 | End: 2021-02-22

## 2021-02-17 RX ADMIN — HEPARIN SODIUM 5000 UNIT(S): 5000 INJECTION INTRAVENOUS; SUBCUTANEOUS at 21:28

## 2021-02-17 RX ADMIN — CHLORHEXIDINE GLUCONATE 1 APPLICATION(S): 213 SOLUTION TOPICAL at 11:50

## 2021-02-17 RX ADMIN — Medication 1 EACH: at 20:24

## 2021-02-17 RX ADMIN — Medication 100 MILLIGRAM(S): at 19:00

## 2021-02-17 RX ADMIN — HEPARIN SODIUM 5000 UNIT(S): 5000 INJECTION INTRAVENOUS; SUBCUTANEOUS at 05:18

## 2021-02-17 RX ADMIN — ATORVASTATIN CALCIUM 40 MILLIGRAM(S): 80 TABLET, FILM COATED ORAL at 21:29

## 2021-02-17 RX ADMIN — INSULIN GLARGINE 30 UNIT(S): 100 INJECTION, SOLUTION SUBCUTANEOUS at 21:29

## 2021-02-17 RX ADMIN — INSULIN HUMAN 3: 100 INJECTION, SOLUTION SUBCUTANEOUS at 12:12

## 2021-02-17 RX ADMIN — ATENOLOL 100 MILLIGRAM(S): 25 TABLET ORAL at 05:18

## 2021-02-17 RX ADMIN — Medication 650 MILLIGRAM(S): at 00:37

## 2021-02-17 RX ADMIN — Medication 650 MILLIGRAM(S): at 19:00

## 2021-02-17 RX ADMIN — IOHEXOL 30 MILLILITER(S): 300 INJECTION, SOLUTION INTRAVENOUS at 08:42

## 2021-02-17 RX ADMIN — PANTOPRAZOLE SODIUM 40 MILLIGRAM(S): 20 TABLET, DELAYED RELEASE ORAL at 12:17

## 2021-02-17 RX ADMIN — HEPARIN SODIUM 5000 UNIT(S): 5000 INJECTION INTRAVENOUS; SUBCUTANEOUS at 14:20

## 2021-02-17 RX ADMIN — CEFEPIME 100 MILLIGRAM(S): 1 INJECTION, POWDER, FOR SOLUTION INTRAMUSCULAR; INTRAVENOUS at 19:00

## 2021-02-17 RX ADMIN — MORPHINE SULFATE 2 MILLIGRAM(S): 50 CAPSULE, EXTENDED RELEASE ORAL at 15:11

## 2021-02-17 RX ADMIN — CEFEPIME 100 MILLIGRAM(S): 1 INJECTION, POWDER, FOR SOLUTION INTRAMUSCULAR; INTRAVENOUS at 05:19

## 2021-02-17 RX ADMIN — Medication 650 MILLIGRAM(S): at 05:18

## 2021-02-17 RX ADMIN — Medication 100 MILLIGRAM(S): at 00:40

## 2021-02-17 RX ADMIN — Medication 100 MILLIGRAM(S): at 08:09

## 2021-02-17 RX ADMIN — Medication 1 TABLET(S): at 11:50

## 2021-02-17 RX ADMIN — Medication 650 MILLIGRAM(S): at 11:50

## 2021-02-17 RX ADMIN — Medication 250 MILLIGRAM(S): at 19:01

## 2021-02-17 NOTE — CONSULT NOTE ADULT - ASSESSMENT
INTERVENTIONAL RADIOLOGY CONSULT:     Procedure Requested:     HPI:  55y female with a past medical hx of HTN, Hyperlipidemia, DM, presents to the ED complaining of increased pain from a chronic umbilical hernia. She states that she first noticed this hernia about 20 years ago, immediately after a tubal ligation procedure, and it has been slowly enlarging since. She states that it always has been uncomfortable, but never this painful. The severe pain is rated 10/10 and is accompanied with nausea and vomiting, of 3 days duration. She states she is not passing gas and that her last bowel movement was 1 week ago.  (10 Feb 2021 22:33)      PAST MEDICAL & SURGICAL HISTORY:  Marijuana use, continuous    Hypertension    Diabetes    No pertinent past medical history    S/P tubal ligation  20 years ago        MEDICATIONS  (STANDING):  acetaminophen   Tablet .. 650 milliGRAM(s) Oral every 6 hours  ATENolol  Tablet 100 milliGRAM(s) Oral daily  atorvastatin 40 milliGRAM(s) Oral at bedtime  cefepime   IVPB 2000 milliGRAM(s) IV Intermittent every 12 hours  chlorhexidine 4% Liquid 1 Application(s) Topical daily  dextrose 40% Gel 15 Gram(s) Oral once  dextrose 5%. 1000 milliLiter(s) (100 mL/Hr) IV Continuous <Continuous>  dextrose 5%. 1000 milliLiter(s) (50 mL/Hr) IV Continuous <Continuous>  dextrose 50% Injectable 25 Gram(s) IV Push once  dextrose 50% Injectable 12.5 Gram(s) IV Push once  dextrose 50% Injectable 25 Gram(s) IV Push once  fat emulsion (Fish Oil and Plant Based) 20% Infusion 1.48 Gm/kG/Day (31.3 mL/Hr) IV Continuous <Continuous>  glucagon  Injectable 1 milliGRAM(s) IntraMuscular once  heparin   Injectable 5000 Unit(s) SubCutaneous every 8 hours  influenza   Vaccine 0.5 milliLiter(s) IntraMuscular once  insulin glargine Injectable (LANTUS) 30 Unit(s) SubCutaneous at bedtime  insulin regular  human corrective regimen sliding scale   SubCutaneous every 6 hours  lactobacillus acidophilus 1 Tablet(s) Oral daily  metroNIDAZOLE  IVPB 500 milliGRAM(s) IV Intermittent every 8 hours  pantoprazole    Tablet 40 milliGRAM(s) Oral daily  Parenteral Nutrition - Adult 1 Each (80 mL/Hr) TPN Continuous <Continuous>  Parenteral Nutrition - Adult 1 Each (80 mL/Hr) TPN Continuous <Continuous>  saccharomyces boulardii 250 milliGRAM(s) Oral two times a day    MEDICATIONS  (PRN):  oxyCODONE    IR 5 milliGRAM(s) Oral every 6 hours PRN Moderate Pain (4 - 6)      Allergies    No Known Allergies    Intolerances        Social History:   Smoking: Yes [ ]  No [ ]   ______pk yrs  ETOH  Yes [ ]  No [ ]  Social [ ]  DRUGS:  Yes [ ]  No [ ]  if so what______________    FAMILY HISTORY:      Physical Exam:   Vital Signs Last 24 Hrs  T(C): 36.6 (17 Feb 2021 12:00), Max: 36.6 (17 Feb 2021 12:00)  T(F): 97.9 (17 Feb 2021 12:00), Max: 97.9 (17 Feb 2021 12:00)  HR: 59 (17 Feb 2021 12:00) (54 - 63)  BP: 161/71 (17 Feb 2021 12:00) (137/68 - 193/84)  BP(mean): 95 (17 Feb 2021 01:00) (92 - 121)  RR: 18 (17 Feb 2021 12:00) (15 - 29)  SpO2: 100% (17 Feb 2021 01:45) (98% - 100%)      Labs:                         8.7    27.85 )-----------( 354      ( 17 Feb 2021 12:07 )             25.1     02-17    138  |  102  |  31<H>  ----------------------------<  152<H>  4.3   |  27  |  0.8    Ca    8.2<L>      17 Feb 2021 06:48  Phos  3.4     02-17  Mg     1.7     02-17    TPro  5.1<L>  /  Alb  2.9<L>  /  TBili  0.5  /  DBili  0.2  /  AST  31  /  ALT  35  /  AlkPhos  138<H>  02-17    PT/INR - ( 17 Feb 2021 06:48 )   PT: 11.70 sec;   INR: 1.02 ratio         PTT - ( 17 Feb 2021 06:48 )  PTT:34.1 sec    Pertinent labs:                      8.7    27.85 )-----------( 354      ( 17 Feb 2021 12:07 )             25.1       02-17    138  |  102  |  31<H>  ----------------------------<  152<H>  4.3   |  27  |  0.8    Ca    8.2<L>      17 Feb 2021 06:48  Phos  3.4     02-17  Mg     1.7     02-17    TPro  5.1<L>  /  Alb  2.9<L>  /  TBili  0.5  /  DBili  0.2  /  AST  31  /  ALT  35  /  AlkPhos  138<H>  02-17      PT/INR - ( 17 Feb 2021 06:48 )   PT: 11.70 sec;   INR: 1.02 ratio         PTT - ( 17 Feb 2021 06:48 )  PTT:34.1 sec    Radiology & Additional Studies:     Radiology imaging reviewed.       ASSESSMENT AND PLAN:  55y female with a past medical hx of HTN, Hyperlipidemia, DM admitted for incarcerated umbilical hernia. Pt is s/p exploratory laparotomy, small bowel resection, right hemicolectomy.    Consult for perc zack.     Leukocytosis of 28K  Vitals OK. Afebrile.  Imaging reviewed. GB stable in appearance since prior to surgery.  Recommend HIDA scan and please place silver.  Will f/u tomorrow.    Thank you for the courtesy of this consult, please call [phone] with any further questions.

## 2021-02-17 NOTE — PROGRESS NOTE ADULT - ASSESSMENT
55 years old female admitted due to incarcerated umbilical hernia, POD#6, S/P Exploratory laparotomy, small bowel resection, right hemicolectomy. Currently patient stable, on NPO with TPN, pain controlled, having BM, No fever, Had an increased in WBC 22.1, UA negative, blood culture was sent, US RUQ cholelithiasis w/o signs of cholecystitis. VAC working no leaks    Plan    - C/w TPN  - C/w PO medications   - Monitor Vital signs  - Pain control as needed  - Pending Blood culture results  - GI and DVT prophylaxis  - Encourage ambulation and IS   - Continue current management

## 2021-02-17 NOTE — PROGRESS NOTE ADULT - SUBJECTIVE AND OBJECTIVE BOX
GENERAL SURGERY PROGRESS NOTE     MELISSA CARUSO  55y  Female  Hospital day :7d  POD:  Procedure: Negative pressure wound therapy, greater than 50 sq cm    Open repair of strangulated ventral hernia    Abdominal washout    Right hemicolectomy    Resection of small bowel    Exploratory laparotomy      EVENTS IN THE LAST 24 HRS: No acute events, patient stable, on NPO with TPN, pain controlled, having BM, No fever, Had an increased in WBC 22.1, UA negative, blood culture was sent, US RUQ cholelithiasis w/o signs of cholecystitis. VAC working no leaks      T(F): 96.1 (21 @ 20:00), Max: 98.4 (21 @ 15:00)  HR: 57 (21 @ 01:00) (54 - 108)  BP: 146/66 (21 @ 01:00) (126/58 - 193/84)  RR: 18 (21 @ 01:00) (14 - 29)  SpO2: 99% (21 @ 01:00) (98% - 100%)    PHYSICAL EXAM:  GENERAL: NAD,  CHEST/LUNG: Clear to auscultation bilaterally  HEART: Regular rate and rhythm  ABDOMEN: Soft, Nontender, Nondistended; VAC in place no leaks  EXTREMITIES:  No clubbing, cyanosis, or edema      DIET/FLUIDS: dextrose 5%. 1000 milliLiter(s) IV Continuous <Continuous>  dextrose 5%. 1000 milliLiter(s) IV Continuous <Continuous>  fat emulsion (Fish Oil and Plant Based) 20% Infusion 1.48 Gm/kG/Day IV Continuous <Continuous>  Parenteral Nutrition - Adult 1 Each TPN Continuous <Continuous>  Parenteral Nutrition - Adult 1 Each TPN Continuous <Continuous>    NG:                                                                                DRAINS:   02-15-21 @ 07:01  -  21 @ 07:00  --------------------------------------------------------  OUT: 300 mL         GI proph:  pantoprazole    Tablet 40 milliGRAM(s) Oral daily    AC/ proph: heparin   Injectable 5000 Unit(s) SubCutaneous every 8 hours    ABx: cefepime   IVPB 2000 milliGRAM(s) IV Intermittent every 12 hours  metroNIDAZOLE  IVPB 500 milliGRAM(s) IV Intermittent every 8 hours          LABS  Labs:  CAPILLARY BLOOD GLUCOSE      POCT Blood Glucose.: 142 mg/dL (2021 00:43)  POCT Blood Glucose.: 143 mg/dL (2021 21:35)  POCT Blood Glucose.: 174 mg/dL (2021 18:59)  POCT Blood Glucose.: 191 mg/dL (2021 14:14)  POCT Blood Glucose.: 178 mg/dL (2021 10:24)  POCT Blood Glucose.: 138 mg/dL (2021 07:04)  POCT Blood Glucose.: 125 mg/dL (2021 05:53)  POCT Blood Glucose.: 145 mg/dL (2021 04:16)  POCT Blood Glucose.: 127 mg/dL (2021 03:01)  POCT Blood Glucose.: 147 mg/dL (2021 01:36)                          9.0    22.18 )-----------( 335      ( 2021 11:15 )             27.1       Auto Immature Granulocyte %: 17.2 % (21 @ 11:15)  Auto Neutrophil %: 60.7 % (21 @ 11:15)        137  |  99  |  26<H>  ----------------------------<  119<H>  5.1<H>   |  29  |  0.8      Calcium, Total Serum: 8.2 mg/dL (21 @ 04:14)      LFTs:       ABG - ( 2021 19:32 )  pH: 7.42  /  pCO2: 26    /  pO2: 71    / HCO3: 17    / Base Excess: -6.8  /  SaO2: 95              ABG - ( 2021 17:25 )  pH: 7.44  /  pCO2: 25    /  pO2: 140   / HCO3: 17    / Base Excess: -6.2  /  SaO2: 98              ABG - ( 2021 15:06 )  pH: 7.41  /  pCO2: 28    /  pO2: 147   / HCO3: 18    / Base Excess: -5.7  /  SaO2: 99                Coags:     10.80  ----< 0.94    ( 2021 04:14 )     28.6                Urinalysis Basic - ( 2021 17:00 )    Color: Yellow / Appearance: Clear / S.023 / pH: x  Gluc: x / Ketone: Negative  / Bili: Negative / Urobili: <2 mg/dL   Blood: x / Protein: 30 mg/dL / Nitrite: Negative   Leuk Esterase: Negative / RBC: 1 /HPF / WBC 3 /HPF   Sq Epi: x / Non Sq Epi: 3 /HPF / Bacteria: Negative

## 2021-02-18 LAB
ANION GAP SERPL CALC-SCNC: 10 MMOL/L — SIGNIFICANT CHANGE UP (ref 7–14)
ANION GAP SERPL CALC-SCNC: 11 MMOL/L — SIGNIFICANT CHANGE UP (ref 7–14)
BASOPHILS # BLD AUTO: 0.1 K/UL — SIGNIFICANT CHANGE UP (ref 0–0.2)
BASOPHILS NFR BLD AUTO: 0.4 % — SIGNIFICANT CHANGE UP (ref 0–1)
BUN SERPL-MCNC: 24 MG/DL — HIGH (ref 10–20)
BUN SERPL-MCNC: 25 MG/DL — HIGH (ref 10–20)
C DIFF BY PCR RESULT: NEGATIVE — SIGNIFICANT CHANGE UP
C DIFF TOX GENS STL QL NAA+PROBE: SIGNIFICANT CHANGE UP
CALCIUM SERPL-MCNC: 8.2 MG/DL — LOW (ref 8.5–10.1)
CALCIUM SERPL-MCNC: 8.6 MG/DL — SIGNIFICANT CHANGE UP (ref 8.5–10.1)
CHLORIDE SERPL-SCNC: 103 MMOL/L — SIGNIFICANT CHANGE UP (ref 98–110)
CHLORIDE SERPL-SCNC: 104 MMOL/L — SIGNIFICANT CHANGE UP (ref 98–110)
CO2 SERPL-SCNC: 26 MMOL/L — SIGNIFICANT CHANGE UP (ref 17–32)
CO2 SERPL-SCNC: 26 MMOL/L — SIGNIFICANT CHANGE UP (ref 17–32)
CREAT SERPL-MCNC: 0.8 MG/DL — SIGNIFICANT CHANGE UP (ref 0.7–1.5)
CREAT SERPL-MCNC: 0.8 MG/DL — SIGNIFICANT CHANGE UP (ref 0.7–1.5)
EOSINOPHIL # BLD AUTO: 0.6 K/UL — SIGNIFICANT CHANGE UP (ref 0–0.7)
EOSINOPHIL NFR BLD AUTO: 2.6 % — SIGNIFICANT CHANGE UP (ref 0–8)
GLUCOSE BLDC GLUCOMTR-MCNC: 114 MG/DL — HIGH (ref 70–99)
GLUCOSE BLDC GLUCOMTR-MCNC: 122 MG/DL — HIGH (ref 70–99)
GLUCOSE BLDC GLUCOMTR-MCNC: 132 MG/DL — HIGH (ref 70–99)
GLUCOSE BLDC GLUCOMTR-MCNC: 94 MG/DL — SIGNIFICANT CHANGE UP (ref 70–99)
GLUCOSE SERPL-MCNC: 126 MG/DL — HIGH (ref 70–99)
GLUCOSE SERPL-MCNC: 77 MG/DL — SIGNIFICANT CHANGE UP (ref 70–99)
HCT VFR BLD CALC: 24.7 % — LOW (ref 37–47)
HCT VFR BLD CALC: 28.5 % — LOW (ref 37–47)
HGB BLD-MCNC: 8.4 G/DL — LOW (ref 12–16)
HGB BLD-MCNC: 9.7 G/DL — LOW (ref 12–16)
IMM GRANULOCYTES NFR BLD AUTO: 12.1 % — HIGH (ref 0.1–0.3)
LYMPHOCYTES # BLD AUTO: 2.12 K/UL — SIGNIFICANT CHANGE UP (ref 1.2–3.4)
LYMPHOCYTES # BLD AUTO: 9 % — LOW (ref 20.5–51.1)
MAGNESIUM SERPL-MCNC: 1.4 MG/DL — LOW (ref 1.8–2.4)
MAGNESIUM SERPL-MCNC: 1.5 MG/DL — LOW (ref 1.8–2.4)
MCHC RBC-ENTMCNC: 30.4 PG — SIGNIFICANT CHANGE UP (ref 27–31)
MCHC RBC-ENTMCNC: 30.7 PG — SIGNIFICANT CHANGE UP (ref 27–31)
MCHC RBC-ENTMCNC: 34 G/DL — SIGNIFICANT CHANGE UP (ref 32–37)
MCHC RBC-ENTMCNC: 34 G/DL — SIGNIFICANT CHANGE UP (ref 32–37)
MCV RBC AUTO: 89.3 FL — SIGNIFICANT CHANGE UP (ref 81–99)
MCV RBC AUTO: 90.1 FL — SIGNIFICANT CHANGE UP (ref 81–99)
MONOCYTES # BLD AUTO: 1.44 K/UL — HIGH (ref 0.1–0.6)
MONOCYTES NFR BLD AUTO: 6.1 % — SIGNIFICANT CHANGE UP (ref 1.7–9.3)
NEUTROPHILS # BLD AUTO: 16.35 K/UL — HIGH (ref 1.4–6.5)
NEUTROPHILS NFR BLD AUTO: 69.8 % — SIGNIFICANT CHANGE UP (ref 42.2–75.2)
NRBC # BLD: 0 /100 WBCS — SIGNIFICANT CHANGE UP (ref 0–0)
NRBC # BLD: 0 /100 WBCS — SIGNIFICANT CHANGE UP (ref 0–0)
PHOSPHATE SERPL-MCNC: 3.9 MG/DL — SIGNIFICANT CHANGE UP (ref 2.1–4.9)
PHOSPHATE SERPL-MCNC: 3.9 MG/DL — SIGNIFICANT CHANGE UP (ref 2.1–4.9)
PLATELET # BLD AUTO: 410 K/UL — HIGH (ref 130–400)
PLATELET # BLD AUTO: 431 K/UL — HIGH (ref 130–400)
POTASSIUM SERPL-MCNC: 4.2 MMOL/L — SIGNIFICANT CHANGE UP (ref 3.5–5)
POTASSIUM SERPL-MCNC: 4.3 MMOL/L — SIGNIFICANT CHANGE UP (ref 3.5–5)
POTASSIUM SERPL-SCNC: 4.2 MMOL/L — SIGNIFICANT CHANGE UP (ref 3.5–5)
POTASSIUM SERPL-SCNC: 4.3 MMOL/L — SIGNIFICANT CHANGE UP (ref 3.5–5)
RBC # BLD: 2.74 M/UL — LOW (ref 4.2–5.4)
RBC # BLD: 3.19 M/UL — LOW (ref 4.2–5.4)
RBC # FLD: 14.1 % — SIGNIFICANT CHANGE UP (ref 11.5–14.5)
RBC # FLD: 14.3 % — SIGNIFICANT CHANGE UP (ref 11.5–14.5)
SODIUM SERPL-SCNC: 139 MMOL/L — SIGNIFICANT CHANGE UP (ref 135–146)
SODIUM SERPL-SCNC: 141 MMOL/L — SIGNIFICANT CHANGE UP (ref 135–146)
WBC # BLD: 23.44 K/UL — HIGH (ref 4.8–10.8)
WBC # BLD: 29.28 K/UL — HIGH (ref 4.8–10.8)
WBC # FLD AUTO: 23.44 K/UL — HIGH (ref 4.8–10.8)
WBC # FLD AUTO: 29.28 K/UL — HIGH (ref 4.8–10.8)
ZINC SERPL-MCNC: 61 UG/DL — SIGNIFICANT CHANGE UP (ref 44–115)

## 2021-02-18 PROCEDURE — 47490 INCISION OF GALLBLADDER: CPT

## 2021-02-18 PROCEDURE — 99152 MOD SED SAME PHYS/QHP 5/>YRS: CPT

## 2021-02-18 RX ORDER — ELECTROLYTE SOLUTION,INJ
1 VIAL (ML) INTRAVENOUS
Refills: 0 | Status: DISCONTINUED | OUTPATIENT
Start: 2021-02-18 | End: 2021-02-19

## 2021-02-18 RX ORDER — PIPERACILLIN AND TAZOBACTAM 4; .5 G/20ML; G/20ML
3.38 INJECTION, POWDER, LYOPHILIZED, FOR SOLUTION INTRAVENOUS ONCE
Refills: 0 | Status: COMPLETED | OUTPATIENT
Start: 2021-02-18 | End: 2021-02-18

## 2021-02-18 RX ORDER — PIPERACILLIN AND TAZOBACTAM 4; .5 G/20ML; G/20ML
3.38 INJECTION, POWDER, LYOPHILIZED, FOR SOLUTION INTRAVENOUS EVERY 8 HOURS
Refills: 0 | Status: DISCONTINUED | OUTPATIENT
Start: 2021-02-18 | End: 2021-02-22

## 2021-02-18 RX ORDER — TAMSULOSIN HYDROCHLORIDE 0.4 MG/1
0.4 CAPSULE ORAL AT BEDTIME
Refills: 0 | Status: DISCONTINUED | OUTPATIENT
Start: 2021-02-18 | End: 2021-02-18

## 2021-02-18 RX ORDER — I.V. FAT EMULSION 20 G/100ML
1.48 EMULSION INTRAVENOUS
Qty: 100.05 | Refills: 0 | Status: DISCONTINUED | OUTPATIENT
Start: 2021-02-18 | End: 2021-02-19

## 2021-02-18 RX ORDER — TAMSULOSIN HYDROCHLORIDE 0.4 MG/1
0.4 CAPSULE ORAL AT BEDTIME
Refills: 0 | Status: DISCONTINUED | OUTPATIENT
Start: 2021-02-18 | End: 2021-02-22

## 2021-02-18 RX ADMIN — HEPARIN SODIUM 5000 UNIT(S): 5000 INJECTION INTRAVENOUS; SUBCUTANEOUS at 21:09

## 2021-02-18 RX ADMIN — HEPARIN SODIUM 5000 UNIT(S): 5000 INJECTION INTRAVENOUS; SUBCUTANEOUS at 04:59

## 2021-02-18 RX ADMIN — Medication 80 EACH: at 16:59

## 2021-02-18 RX ADMIN — INSULIN GLARGINE 30 UNIT(S): 100 INJECTION, SOLUTION SUBCUTANEOUS at 21:09

## 2021-02-18 RX ADMIN — PIPERACILLIN AND TAZOBACTAM 25 GRAM(S): 4; .5 INJECTION, POWDER, LYOPHILIZED, FOR SOLUTION INTRAVENOUS at 21:10

## 2021-02-18 RX ADMIN — PANTOPRAZOLE SODIUM 40 MILLIGRAM(S): 20 TABLET, DELAYED RELEASE ORAL at 14:31

## 2021-02-18 RX ADMIN — TAMSULOSIN HYDROCHLORIDE 0.4 MILLIGRAM(S): 0.4 CAPSULE ORAL at 21:08

## 2021-02-18 RX ADMIN — Medication 100 MILLIGRAM(S): at 02:03

## 2021-02-18 RX ADMIN — Medication 650 MILLIGRAM(S): at 01:23

## 2021-02-18 RX ADMIN — Medication 650 MILLIGRAM(S): at 23:45

## 2021-02-18 RX ADMIN — Medication 100 MILLIGRAM(S): at 09:01

## 2021-02-18 RX ADMIN — HEPARIN SODIUM 5000 UNIT(S): 5000 INJECTION INTRAVENOUS; SUBCUTANEOUS at 14:30

## 2021-02-18 RX ADMIN — Medication 1 TABLET(S): at 14:30

## 2021-02-18 RX ADMIN — PIPERACILLIN AND TAZOBACTAM 200 GRAM(S): 4; .5 INJECTION, POWDER, LYOPHILIZED, FOR SOLUTION INTRAVENOUS at 14:33

## 2021-02-18 RX ADMIN — Medication 250 MILLIGRAM(S): at 04:58

## 2021-02-18 RX ADMIN — CEFEPIME 100 MILLIGRAM(S): 1 INJECTION, POWDER, FOR SOLUTION INTRAMUSCULAR; INTRAVENOUS at 04:58

## 2021-02-18 RX ADMIN — Medication 250 MILLIGRAM(S): at 17:49

## 2021-02-18 RX ADMIN — ATORVASTATIN CALCIUM 40 MILLIGRAM(S): 80 TABLET, FILM COATED ORAL at 21:08

## 2021-02-18 RX ADMIN — PIPERACILLIN AND TAZOBACTAM 25 GRAM(S): 4; .5 INJECTION, POWDER, LYOPHILIZED, FOR SOLUTION INTRAVENOUS at 17:05

## 2021-02-18 RX ADMIN — Medication 1 EACH: at 21:09

## 2021-02-18 RX ADMIN — I.V. FAT EMULSION 31.3 GM/KG/DAY: 20 EMULSION INTRAVENOUS at 21:09

## 2021-02-18 RX ADMIN — Medication 650 MILLIGRAM(S): at 04:58

## 2021-02-18 RX ADMIN — Medication 650 MILLIGRAM(S): at 17:06

## 2021-02-18 NOTE — PROVIDER CONTACT NOTE (OTHER) - REASON
bp 154/74 pulse 75. Need IV metoprolol pushed
fingerstick 307
bp 157/74 pulse 70
>797cc bladder scan

## 2021-02-18 NOTE — PROGRESS NOTE ADULT - ASSESSMENT
55 years old female admitted due to incarcerated umbilical hernia, POD#6, S/P Exploratory laparotomy, small bowel resection, right hemicolectomy. Currently patient stable, on NPO with TPN, pain controlled, having BM, No fever, Had an increased in WBC 22.1, UA negative, blood culture was sent, US RUQ cholelithiasis w/o signs of cholecystitis. VAC working no leaks    Plan    - C/w TPN  - C/w PO medications   - Monitor Vital signs  - Pain control as needed  - Pending Blood culture results  - GI and DVT prophylaxis  - Encourage ambulation and IS   - Continue current management  - fu IR for percholi

## 2021-02-18 NOTE — PROGRESS NOTE ADULT - ASSESSMENT
Vascular & Interventional Radiology Pre-Procedure Note    Procedure Name: Collette dixon    HPI: HPI:  55y female with a past medical hx of HTN, Hyperlipidemia, DM, presents to the ED complaining of increased pain from a chronic umbilical hernia. She states that she first noticed this hernia about 20 years ago, immediately after a tubal ligation procedure, and it has been slowly enlarging since. She states that it always has been uncomfortable, but never this painful. The severe pain is rated 10/10 and is accompanied with nausea and vomiting, of 3 days duration. She states she is not passing gas and that her last bowel movement was 1 week ago.  (10 Feb 2021 22:33)      Allergies:   Medications (Abx/Cardiac/Anticoagulation/Blood Products)  ATENolol  Tablet: 100 milliGRAM(s) Oral (02-17 @ 05:18)  cefepime   IVPB: 100 mL/Hr IV Intermittent (02-18 @ 04:58)  heparin   Injectable: 5000 Unit(s) SubCutaneous (02-18 @ 04:59)  metroNIDAZOLE  IVPB: 100 mL/Hr IV Intermittent (02-18 @ 09:01)    Data:    T(C): 36.2  HR: 55  BP: 149/68  RR: 18  SpO2: --    Radiology & Additional Studies: Radiology imaging reviewed.     Labs:   -WBC 29.28 / HgB 9.7 / Hct 28.5 / Plt 410  -Na 141 / Cl 104 / BUN 25 / Glucose 77  -K 4.3 / CO2 26 / Cr 0.8  -ALT -- / Alk Phos -- / T.Bili --  -INR1.02      EKG completed (date):     Height/Weight: Daily     Daily     Consentable: Yes       Plan:   55y female with a past medical hx of HTN, Hyperlipidemia, DM admitted for incarcerated umbilical hernia.   Pt is s/p exploratory laparotomy, small bowel resection, right hemicolectomy.    WBC count 29K  Pt ill-appearing.  Vitals stable.  Risks/Benefits/alternatives explained with the patient and witnessed informed consent obtained.    Vascular & Interventional Radiology Pre-Procedure Note    Procedure Name: Perc cholecystostomy catheter placement    HPI: HPI:  55y female with a past medical hx of HTN, Hyperlipidemia, DM, presents to the ED complaining of increased pain from a chronic umbilical hernia. She states that she first noticed this hernia about 20 years ago, immediately after a tubal ligation procedure, and it has been slowly enlarging since. She states that it always has been uncomfortable, but never this painful. The severe pain is rated 10/10 and is accompanied with nausea and vomiting, of 3 days duration. She states she is not passing gas and that her last bowel movement was 1 week ago.  (10 Feb 2021 22:33)      Allergies:   Medications (Abx/Cardiac/Anticoagulation/Blood Products)  ATENolol  Tablet: 100 milliGRAM(s) Oral (02-17 @ 05:18)  cefepime   IVPB: 100 mL/Hr IV Intermittent (02-18 @ 04:58)  heparin   Injectable: 5000 Unit(s) SubCutaneous (02-18 @ 04:59)  metroNIDAZOLE  IVPB: 100 mL/Hr IV Intermittent (02-18 @ 09:01)    Data:    T(C): 36.2  HR: 55  BP: 149/68  RR: 18  SpO2: --    Radiology & Additional Studies: Radiology imaging reviewed.     Labs:   -WBC 29.28 / HgB 9.7 / Hct 28.5 / Plt 410  -Na 141 / Cl 104 / BUN 25 / Glucose 77  -K 4.3 / CO2 26 / Cr 0.8  -ALT -- / Alk Phos -- / T.Bili --  -INR1.02      EKG completed (date):     Height/Weight: Daily     Daily     Consentable: Yes       Plan:   55y female with a past medical hx of HTN, Hyperlipidemia, DM admitted for incarcerated umbilical hernia.   Pt is s/p exploratory laparotomy, small bowel resection, right hemicolectomy.    WBC count 29K  Pt ill-appearing.  Vitals stable.  Risks/Benefits/alternatives explained with the patient and witnessed informed consent obtained.

## 2021-02-18 NOTE — PROGRESS NOTE ADULT - SUBJECTIVE AND OBJECTIVE BOX
INTERVENTIONAL RADIOLOGY BRIEF-OPERATIVE NOTE    Procedure: Percutaneous cholecystostomy catheter placement    Pre-Op Diagnosis: Cholecystitis    Post-Op Diagnosis: Same    Attending: Elliot Landau  Resident: None    Anesthesia (type):  [ ] General Anesthesia  [x] Sedation  [ ] Spinal Anesthesia  [x] Local/Regional    Contrast: 20 cc    Estimated Blood Loss: < 5 cc    Condition:   [ ] Critical  [ ] Serious  [ ] Fair   [x] Good    Findings/Follow up Plan of Care: Percutaneous cholecystostomy catheter placement without complication. Patient tolerated procedure well.    Specimens Removed: Microbiology    Implants: None    Complications: None    Disposition: Return to unit      Please call Interventional Radiology [phone] with any questions, concerns, or issues.

## 2021-02-18 NOTE — CONSULT NOTE ADULT - ASSESSMENT
ASSESSMENT    55y female with a past medical hx of HTN, Hyperlipidemia, DM, presents to the ED complaining of increased pain from a chronic umbilical hernia found to have SBO with incarcerated bowel   #Incarcerated Hernia  - CT Abd/Pelvis 2/10 showing large ventral hernia with SBO at level of hernia, concern for ischemic bowel   - S/p ex lap 2/11 found to have frankly necrotic small bowel, 164 cm resected with ischemic ascending colon and cecum, thrombosed mesentery, large complex ventral hernia, side to side anastomososis  -  CT Abd/Pelvis 2/17 with post surgical appearance s/p bowel resection without intra-abdominal fluid collection, mild hemorrhagic and fat stranding in RLQ   - s/p IR guided cholecystostomy catheter placement 2/19    #Leukocytosis   #HLD  #Obesity BMI (kg/m2): 31.3  #DM   #Abx allergy: NKDA      RECOMMENDATIONS  - check C diff PCR  - continue zosyn 3.375 mg q 8 hours  - will trend WBC after placement of IR guided cholecystostomy   - blood cx if WBC worsening tomorrow or if with fevers  - trend WBC - reactive, rule out C diff infection     Please call or message on Microsoft Teams if with any questions.  Spectra [phone]

## 2021-02-18 NOTE — PROGRESS NOTE ADULT - SUBJECTIVE AND OBJECTIVE BOX
GENERAL SURGERY PROGRESS NOTE     MELISSA CARUSO  55y  Female  Hospital day :8d  POD:  Procedure: Negative pressure wound therapy, greater than 50 sq cm    Open repair of strangulated ventral hernia    Abdominal washout    Right hemicolectomy    Resection of small bowel    Exploratory laparotomy      OVERNIGHT EVENTS: no acute overnight events     T(F): 97.2 (21 @ 06:06), Max: 98.1 (21 @ 19:40)  HR: 55 (21 @ 06:06) (55 - 61)  BP: 149/68 (21 @ 06:06) (149/68 - 181/84)  ABP: --  ABP(mean): --  RR: 18 (21 @ 06:06) (18 - 18)  SpO2: --    DIET/FLUIDS: Parenteral Nutrition - Adult 1 Each TPN Continuous <Continuous>       GI proph:  pantoprazole    Tablet 40 milliGRAM(s) Oral daily    AC/ proph: heparin   Injectable 5000 Unit(s) SubCutaneous every 8 hours    ABx: cefepime   IVPB 2000 milliGRAM(s) IV Intermittent every 12 hours  metroNIDAZOLE  IVPB 500 milliGRAM(s) IV Intermittent every 8 hours      PHYSICAL EXAM:  GENERAL: NAD, well-appearing  CHEST/LUNG: Clear to auscultation bilaterally  HEART: Regular rate and rhythm  ABDOMEN: Soft, Nontender, Nondistended;   EXTREMITIES:  No clubbing, cyanosis, or edema      LABS  Labs:  CAPILLARY BLOOD GLUCOSE      POCT Blood Glucose.: 94 mg/dL (2021 05:02)  POCT Blood Glucose.: 114 mg/dL (2021 01:09)  POCT Blood Glucose.: 141 mg/dL (2021 22:22)  POCT Blood Glucose.: 126 mg/dL (2021 20:53)  POCT Blood Glucose.: 172 mg/dL (2021 11:55)                          9.7    29.28 )-----------( 410      ( 2021 04:15 )             28.5       Auto Neutrophil %: 70.4 % (21 @ 12:07)  Auto Immature Granulocyte %: 14.8 % (21 @ 12:07)        141  |  104  |  25<H>  ----------------------------<  77  4.3   |  26  |  0.8      Calcium, Total Serum: 8.6 mg/dL (21 @ 04:15)      LFTs:             5.1  | 0.5  | 31       ------------------[138     ( 2021 06:48 )  2.9  | 0.2  | 35          Lipase:x      Amylase:x           ABG - ( 2021 19:32 )  pH: 7.42  /  pCO2: 26    /  pO2: 71    / HCO3: 17    / Base Excess: -6.8  /  SaO2: 95              ABG - ( 2021 17:25 )  pH: 7.44  /  pCO2: 25    /  pO2: 140   / HCO3: 17    / Base Excess: -6.2  /  SaO2: 98              ABG - ( 2021 15:06 )  pH: 7.41  /  pCO2: 28    /  pO2: 147   / HCO3: 18    / Base Excess: -5.7  /  SaO2: 99                Coags:     11.70  ----< 1.02    ( 2021 06:48 )     34.1                Urinalysis Basic - ( 2021 21:18 )    Color: Light Yellow / Appearance: Clear / S.012 / pH: x  Gluc: x / Ketone: Negative  / Bili: Negative / Urobili: <2 mg/dL   Blood: x / Protein: Trace / Nitrite: Negative   Leuk Esterase: Negative / RBC: x / WBC x   Sq Epi: x / Non Sq Epi: x / Bacteria: x        Culture - Blood (collected 2021 17:03)  Source: .Blood None  Preliminary Report (2021 03:01):    No growth to date.          RADIOLOGY & ADDITIONAL TESTS:      A/P

## 2021-02-19 LAB
ALBUMIN SERPL ELPH-MCNC: 2.8 G/DL — LOW (ref 3.5–5.2)
ALP SERPL-CCNC: 107 U/L — SIGNIFICANT CHANGE UP (ref 30–115)
ALT FLD-CCNC: 23 U/L — SIGNIFICANT CHANGE UP (ref 0–41)
ANION GAP SERPL CALC-SCNC: 10 MMOL/L — SIGNIFICANT CHANGE UP (ref 7–14)
AST SERPL-CCNC: 22 U/L — SIGNIFICANT CHANGE UP (ref 0–41)
BASOPHILS # BLD AUTO: 0.13 K/UL — SIGNIFICANT CHANGE UP (ref 0–0.2)
BASOPHILS NFR BLD AUTO: 0.6 % — SIGNIFICANT CHANGE UP (ref 0–1)
BILIRUB DIRECT SERPL-MCNC: 0.2 MG/DL — SIGNIFICANT CHANGE UP (ref 0–0.2)
BILIRUB INDIRECT FLD-MCNC: 0.2 MG/DL — SIGNIFICANT CHANGE UP (ref 0.2–1.2)
BILIRUB SERPL-MCNC: 0.4 MG/DL — SIGNIFICANT CHANGE UP (ref 0.2–1.2)
BUN SERPL-MCNC: 20 MG/DL — SIGNIFICANT CHANGE UP (ref 10–20)
CALCIUM SERPL-MCNC: 8.7 MG/DL — SIGNIFICANT CHANGE UP (ref 8.5–10.1)
CHLORIDE SERPL-SCNC: 105 MMOL/L — SIGNIFICANT CHANGE UP (ref 98–110)
CO2 SERPL-SCNC: 26 MMOL/L — SIGNIFICANT CHANGE UP (ref 17–32)
CREAT SERPL-MCNC: 0.8 MG/DL — SIGNIFICANT CHANGE UP (ref 0.7–1.5)
EOSINOPHIL # BLD AUTO: 0.66 K/UL — SIGNIFICANT CHANGE UP (ref 0–0.7)
EOSINOPHIL NFR BLD AUTO: 2.8 % — SIGNIFICANT CHANGE UP (ref 0–8)
GLUCOSE BLDC GLUCOMTR-MCNC: 106 MG/DL — HIGH (ref 70–99)
GLUCOSE BLDC GLUCOMTR-MCNC: 121 MG/DL — HIGH (ref 70–99)
GLUCOSE BLDC GLUCOMTR-MCNC: 140 MG/DL — HIGH (ref 70–99)
GLUCOSE BLDC GLUCOMTR-MCNC: 141 MG/DL — HIGH (ref 70–99)
GLUCOSE BLDC GLUCOMTR-MCNC: 156 MG/DL — HIGH (ref 70–99)
GLUCOSE BLDC GLUCOMTR-MCNC: 158 MG/DL — HIGH (ref 70–99)
GLUCOSE SERPL-MCNC: 155 MG/DL — HIGH (ref 70–99)
HCT VFR BLD CALC: 24.4 % — LOW (ref 37–47)
HGB BLD-MCNC: 8.2 G/DL — LOW (ref 12–16)
IMM GRANULOCYTES NFR BLD AUTO: 9.5 % — HIGH (ref 0.1–0.3)
LYMPHOCYTES # BLD AUTO: 1.83 K/UL — SIGNIFICANT CHANGE UP (ref 1.2–3.4)
LYMPHOCYTES # BLD AUTO: 7.9 % — LOW (ref 20.5–51.1)
MAGNESIUM SERPL-MCNC: 1.6 MG/DL — LOW (ref 1.8–2.4)
MCHC RBC-ENTMCNC: 30.6 PG — SIGNIFICANT CHANGE UP (ref 27–31)
MCHC RBC-ENTMCNC: 33.6 G/DL — SIGNIFICANT CHANGE UP (ref 32–37)
MCV RBC AUTO: 91 FL — SIGNIFICANT CHANGE UP (ref 81–99)
MONOCYTES # BLD AUTO: 1.21 K/UL — HIGH (ref 0.1–0.6)
MONOCYTES NFR BLD AUTO: 5.2 % — SIGNIFICANT CHANGE UP (ref 1.7–9.3)
NEUTROPHILS # BLD AUTO: 17.17 K/UL — HIGH (ref 1.4–6.5)
NEUTROPHILS NFR BLD AUTO: 74 % — SIGNIFICANT CHANGE UP (ref 42.2–75.2)
NIGHT BLUE STAIN TISS: SIGNIFICANT CHANGE UP
NRBC # BLD: 0 /100 WBCS — SIGNIFICANT CHANGE UP (ref 0–0)
PHOSPHATE SERPL-MCNC: 3.8 MG/DL — SIGNIFICANT CHANGE UP (ref 2.1–4.9)
PLATELET # BLD AUTO: 445 K/UL — HIGH (ref 130–400)
POTASSIUM SERPL-MCNC: 4.2 MMOL/L — SIGNIFICANT CHANGE UP (ref 3.5–5)
POTASSIUM SERPL-SCNC: 4.2 MMOL/L — SIGNIFICANT CHANGE UP (ref 3.5–5)
PROT SERPL-MCNC: 5.3 G/DL — LOW (ref 6–8)
RBC # BLD: 2.68 M/UL — LOW (ref 4.2–5.4)
RBC # FLD: 14.5 % — SIGNIFICANT CHANGE UP (ref 11.5–14.5)
SODIUM SERPL-SCNC: 141 MMOL/L — SIGNIFICANT CHANGE UP (ref 135–146)
SPECIMEN SOURCE: SIGNIFICANT CHANGE UP
WBC # BLD: 23.2 K/UL — HIGH (ref 4.8–10.8)
WBC # FLD AUTO: 23.2 K/UL — HIGH (ref 4.8–10.8)

## 2021-02-19 PROCEDURE — 99232 SBSQ HOSP IP/OBS MODERATE 35: CPT

## 2021-02-19 RX ORDER — MAGNESIUM SULFATE 500 MG/ML
2 VIAL (ML) INJECTION ONCE
Refills: 0 | Status: COMPLETED | OUTPATIENT
Start: 2021-02-19 | End: 2021-02-19

## 2021-02-19 RX ORDER — ELECTROLYTE SOLUTION,INJ
1 VIAL (ML) INTRAVENOUS
Refills: 0 | Status: DISCONTINUED | OUTPATIENT
Start: 2021-02-19 | End: 2021-02-19

## 2021-02-19 RX ORDER — I.V. FAT EMULSION 20 G/100ML
1.48 EMULSION INTRAVENOUS
Qty: 100.05 | Refills: 0 | Status: DISCONTINUED | OUTPATIENT
Start: 2021-02-19 | End: 2021-02-19

## 2021-02-19 RX ADMIN — Medication 1 TABLET(S): at 11:45

## 2021-02-19 RX ADMIN — Medication 50 GRAM(S): at 17:57

## 2021-02-19 RX ADMIN — TAMSULOSIN HYDROCHLORIDE 0.4 MILLIGRAM(S): 0.4 CAPSULE ORAL at 20:40

## 2021-02-19 RX ADMIN — Medication 1 EACH: at 15:13

## 2021-02-19 RX ADMIN — PIPERACILLIN AND TAZOBACTAM 25 GRAM(S): 4; .5 INJECTION, POWDER, LYOPHILIZED, FOR SOLUTION INTRAVENOUS at 05:19

## 2021-02-19 RX ADMIN — Medication 1 EACH: at 20:40

## 2021-02-19 RX ADMIN — HEPARIN SODIUM 5000 UNIT(S): 5000 INJECTION INTRAVENOUS; SUBCUTANEOUS at 13:57

## 2021-02-19 RX ADMIN — Medication 250 MILLIGRAM(S): at 18:19

## 2021-02-19 RX ADMIN — INSULIN HUMAN 3: 100 INJECTION, SOLUTION SUBCUTANEOUS at 06:13

## 2021-02-19 RX ADMIN — PANTOPRAZOLE SODIUM 40 MILLIGRAM(S): 20 TABLET, DELAYED RELEASE ORAL at 11:46

## 2021-02-19 RX ADMIN — PIPERACILLIN AND TAZOBACTAM 25 GRAM(S): 4; .5 INJECTION, POWDER, LYOPHILIZED, FOR SOLUTION INTRAVENOUS at 13:56

## 2021-02-19 RX ADMIN — ATENOLOL 100 MILLIGRAM(S): 25 TABLET ORAL at 05:20

## 2021-02-19 RX ADMIN — Medication 250 MILLIGRAM(S): at 05:19

## 2021-02-19 RX ADMIN — Medication 650 MILLIGRAM(S): at 05:20

## 2021-02-19 RX ADMIN — PIPERACILLIN AND TAZOBACTAM 25 GRAM(S): 4; .5 INJECTION, POWDER, LYOPHILIZED, FOR SOLUTION INTRAVENOUS at 20:41

## 2021-02-19 RX ADMIN — HEPARIN SODIUM 5000 UNIT(S): 5000 INJECTION INTRAVENOUS; SUBCUTANEOUS at 05:21

## 2021-02-19 RX ADMIN — I.V. FAT EMULSION 31.3 GM/KG/DAY: 20 EMULSION INTRAVENOUS at 15:13

## 2021-02-19 RX ADMIN — HEPARIN SODIUM 5000 UNIT(S): 5000 INJECTION INTRAVENOUS; SUBCUTANEOUS at 20:40

## 2021-02-19 RX ADMIN — I.V. FAT EMULSION 31.3 GM/KG/DAY: 20 EMULSION INTRAVENOUS at 20:39

## 2021-02-19 RX ADMIN — Medication 650 MILLIGRAM(S): at 11:45

## 2021-02-19 RX ADMIN — ATORVASTATIN CALCIUM 40 MILLIGRAM(S): 80 TABLET, FILM COATED ORAL at 20:40

## 2021-02-19 RX ADMIN — INSULIN HUMAN 3: 100 INJECTION, SOLUTION SUBCUTANEOUS at 01:00

## 2021-02-19 NOTE — CHART NOTE - NSCHARTNOTEFT_GEN_A_CORE
US RUQ showed cholelithiasis w/o signs of cholecystitis  s/p perc zack placement by IR yesterday, approximately 150cc of black tarry bile was drained during procedure  Wound vac in place, change today  Afebrile  Remains NPO with TPN infusing via PICC  Mg 1.6 today    T(F): 98.2 (02-19-21 @ 05:12), Max: 98.7 (02-18-21 @ 17:02)  HR: 75 (02-19-21 @ 05:12) (61 - 75)  BP: 144/69 (02-19-21 @ 05:12) (134/70 - 174/85)  RR: 18 (02-19-21 @ 05:12) (18 - 18)  SpO2: --    I&O's Detail    18 Feb 2021 07:01  -  19 Feb 2021 07:00  --------------------------------------------------------  IN:    IV PiggyBack: 100 mL    IV PiggyBack: 200 mL    TPN (Total Parenteral Nutrition): 800 mL  Total IN: 1100 mL    OUT:    Indwelling Catheter - Urethral (mL): 1850 mL    Intermittent Catheterization - Urethral (mL): 1200 mL    Voided (mL): 350 mL  Total OUT: 3400 mL    Total NET: -2300 mL    02-19    141  |  105  |  20  ----------------------------<  155<H>  4.2   |  26  |  0.8    Ca    8.7      19 Feb 2021 08:11  Phos  3.8     02-19  Mg     1.6     02-19    TPro  5.3<L>  /  Alb  2.8<L>  /  TBili  0.4  /  DBili  0.2  /  AST  22  /  ALT  23  /  AlkPhos  107  02-19                        8.2    23.20 )-----------( 445      ( 19 Feb 2021 08:11 )             24.4     CAPILLARY BLOOD GLUCOSE  POCT Blood Glucose.: 158 mg/dL (19 Feb 2021 05:45)  POCT Blood Glucose.: 156 mg/dL (19 Feb 2021 00:41)  POCT Blood Glucose.: 122 mg/dL (18 Feb 2021 20:51)  POCT Blood Glucose.: 132 mg/dL (18 Feb 2021 17:09)     Diet, NPO:   Except Medications  With Ice Chips/Sips of Water (02-16-21 @ 10:25)    ASSESSMENT  55 F with PMH HTN, HLD, DM 2, with strangulated ventral hernia s/p 165 cm SBR/Right hemicolectomy with primary anastamosis (2/11)    PLAN  - cont TPN   - Mg rider today  - daily bmp, in phos, mg while on parenteral nutrition  - glycemic control US RUQ showed cholelithiasis w/o signs of cholecystitis  s/p perc zack placement by IR yesterday, approximately 150cc of black tarry bile was drained during procedure  Wound vac in place, change today  Afebrile, alert, Ox3, feeling somewhat better today, no NG  Remains NPO with TPN infusing via PICC - L arm, c/d/i  Mg 1.6 today    T(F): 98.2 (02-19-21 @ 05:12), Max: 98.7 (02-18-21 @ 17:02)  HR: 75 (02-19-21 @ 05:12) (61 - 75)  BP: 144/69 (02-19-21 @ 05:12) (134/70 - 174/85)  RR: 18 (02-19-21 @ 05:12) (18 - 18)  SpO2: --    I&O's Detail    18 Feb 2021 07:01  -  19 Feb 2021 07:00  --------------------------------------------------------  IN:    IV PiggyBack: 100 mL    IV PiggyBack: 200 mL    TPN (Total Parenteral Nutrition): 800 mL  Total IN: 1100 mL    OUT:    Indwelling Catheter - Urethral (mL): 1850 mL    Intermittent Catheterization - Urethral (mL): 1200 mL    Voided (mL): 350 mL  Total OUT: 3400 mL    Total NET: -2300 mL    02-19    141  |  105  |  20  ----------------------------<  155<H>  4.2   |  26  |  0.8    Ca    8.7      19 Feb 2021 08:11  Phos  3.8     02-19  Mg     1.6     02-19    TPro  5.3<L>  /  Alb  2.8<L>  /  TBili  0.4  /  DBili  0.2  /  AST  22  /  ALT  23  /  AlkPhos  107  02-19                        8.2    23.20 )-----------( 445      ( 19 Feb 2021 08:11 )             24.4     CAPILLARY BLOOD GLUCOSE  POCT Blood Glucose.: 158 mg/dL (19 Feb 2021 05:45)  POCT Blood Glucose.: 156 mg/dL (19 Feb 2021 00:41)  POCT Blood Glucose.: 122 mg/dL (18 Feb 2021 20:51)  POCT Blood Glucose.: 132 mg/dL (18 Feb 2021 17:09)     Diet, NPO:   Except Medications  With Ice Chips/Sips of Water (02-16-21 @ 10:25)    ASSESSMENT  55 F with PMH HTN, HLD, DM 2, with strangulated ventral hernia s/p 165 cm SBR/Right hemicolectomy with primary anastamosis (2/11)    PLAN  - cont TPN   - Mg rider today  - daily bmp, in phos, mg while on parenteral nutrition  - glycemic control  - change PICC dressing and remove gauze

## 2021-02-19 NOTE — PROGRESS NOTE ADULT - SUBJECTIVE AND OBJECTIVE BOX
GENERAL SURGERY PROGRESS NOTE     MELISSA CARUSO  55y  Female  Hospital day :9d  POD:  Procedure: Negative pressure wound therapy, greater than 50 sq cm    Open repair of strangulated ventral hernia    Abdominal washout    Right hemicolectomy    Resection of small bowel    Exploratory laparotomy      OVERNIGHT EVENTS: no acute overnight events     T(F): 98.2 (21 @ 05:12), Max: 98.7 (21 @ 17:02)  HR: 75 (21 @ 05:12) (61 - 75)  BP: 144/69 (21 @ 05:12) (134/70 - 174/85)  ABP: --  ABP(mean): --  RR: 18 (21 @ 05:12) (18 - 18)  SpO2: --    DIET/FLUIDS: fat emulsion (Fish Oil and Plant Based) 20% Infusion 1.48 Gm/kG/Day IV Continuous <Continuous>  Parenteral Nutrition - Adult 1 Each TPN Continuous <Continuous>    NG:                                                                                DRAINS:     BM:     EMESIS:     URINE:   21 @ 07:01  -  21 @ 07:00  --------------------------------------------------------  OUT: 3050 mL     Indwelling Urethral Catheter:     Connect To:  Straight Drainage/[facility]    Indication:  Urinary Retention / Obstruction (21 @ 16:19)    GI proph:  pantoprazole    Tablet 40 milliGRAM(s) Oral daily    AC/ proph: heparin   Injectable 5000 Unit(s) SubCutaneous every 8 hours    ABx: piperacillin/tazobactam IVPB.. 3.375 Gram(s) IV Intermittent every 8 hours      PHYSICAL EXAM:  GENERAL: NAD, well-appearing  CHEST/LUNG: Clear to auscultation bilaterally  HEART: Regular rate and rhythm  ABDOMEN: Soft, Nontender, Nondistended;   EXTREMITIES:  No clubbing, cyanosis, or edema    LABS  Labs:  CAPILLARY BLOOD GLUCOSE      POCT Blood Glucose.: 158 mg/dL (2021 05:45)  POCT Blood Glucose.: 156 mg/dL (2021 00:41)  POCT Blood Glucose.: 122 mg/dL (2021 20:51)  POCT Blood Glucose.: 132 mg/dL (2021 17:09)                          8.4    23.44 )-----------( 431      ( 2021 16:55 )             24.7       Auto Neutrophil %: 69.8 % (21 @ 16:55)  Auto Immature Granulocyte %: 12.1 % (21 @ 16:55)        139  |  103  |  24<H>  ----------------------------<  126<H>  4.2   |  26  |  0.8      Calcium, Total Serum: 8.2 mg/dL (21 @ 16:55)      LFTs:         Coags:            Urinalysis Basic - ( 2021 21:18 )    Color: Light Yellow / Appearance: Clear / S.012 / pH: x  Gluc: x / Ketone: Negative  / Bili: Negative / Urobili: <2 mg/dL   Blood: x / Protein: Trace / Nitrite: Negative   Leuk Esterase: Negative / RBC: x / WBC x   Sq Epi: x / Non Sq Epi: x / Bacteria: x        Culture - Blood (collected 2021 17:03)  Source: .Blood None  Preliminary Report (2021 03:01):    No growth to date.          RADIOLOGY & ADDITIONAL TESTS:      A/P         GENERAL SURGERY PROGRESS NOTE     MELISSA CARUSO  55y  Female  Hospital day :9d  POD:  Procedure: Negative pressure wound therapy, greater than 50 sq cm    Open repair of strangulated ventral hernia    Abdominal washout    Right hemicolectomy    Resection of small bowel    Exploratory laparotomy      OVERNIGHT EVENTS: no acute overnight events     T(F): 98.2 (21 @ 05:12), Max: 98.7 (21 @ 17:02)  HR: 75 (21 @ 05:12) (61 - 75)  BP: 144/69 (21 @ 05:12) (134/70 - 174/85)  ABP: --  ABP(mean): --  RR: 18 (21 @ 05:12) (18 - 18)  SpO2: --    DIET/FLUIDS: fat emulsion (Fish Oil and Plant Based) 20% Infusion 1.48 Gm/kG/Day IV Continuous <Continuous>  Parenteral Nutrition - Adult 1 Each TPN Continuous <Continuous>    NG:                                                                                DRAINS:     BM:     EMESIS:     URINE:   21 @ 07:01  -  21 @ 07:00  --------------------------------------------------------  OUT: 3050 mL     Indwelling Urethral Catheter:     Connect To:  Straight Drainage/[facility]    Indication:  Urinary Retention / Obstruction (21 @ 16:19)    GI proph:  pantoprazole    Tablet 40 milliGRAM(s) Oral daily    AC/ proph: heparin   Injectable 5000 Unit(s) SubCutaneous every 8 hours    ABx: piperacillin/tazobactam IVPB.. 3.375 Gram(s) IV Intermittent every 8 hours      PHYSICAL EXAM:  GENERAL: NAD, well-appearing  CHEST/LUNG: Clear to auscultation bilaterally  HEART: Regular rate and rhythm  ABDOMEN: Soft, Nontender, Nondistended; WOUND VAC IN PLACE   EXTREMITIES:  No clubbing, cyanosis, or edema    LABS  Labs:  CAPILLARY BLOOD GLUCOSE      POCT Blood Glucose.: 158 mg/dL (2021 05:45)  POCT Blood Glucose.: 156 mg/dL (2021 00:41)  POCT Blood Glucose.: 122 mg/dL (2021 20:51)  POCT Blood Glucose.: 132 mg/dL (2021 17:09)                          8.4    23.44 )-----------( 431      ( 2021 16:55 )             24.7       Auto Neutrophil %: 69.8 % (21 @ 16:55)  Auto Immature Granulocyte %: 12.1 % (21 @ 16:55)        139  |  103  |  24<H>  ----------------------------<  126<H>  4.2   |  26  |  0.8      Calcium, Total Serum: 8.2 mg/dL (21 @ 16:55)      LFTs:         Coags:            Urinalysis Basic - ( 2021 21:18 )    Color: Light Yellow / Appearance: Clear / S.012 / pH: x  Gluc: x / Ketone: Negative  / Bili: Negative / Urobili: <2 mg/dL   Blood: x / Protein: Trace / Nitrite: Negative   Leuk Esterase: Negative / RBC: x / WBC x   Sq Epi: x / Non Sq Epi: x / Bacteria: x        Culture - Blood (collected 2021 17:03)  Source: .Blood None  Preliminary Report (2021 03:01):    No growth to date.          RADIOLOGY & ADDITIONAL TESTS:      A/P

## 2021-02-19 NOTE — PROGRESS NOTE ADULT - SUBJECTIVE AND OBJECTIVE BOX
MELISSA CARUSO  55y, Female  Allergy: No Known Allergies      LOS  9d    CHIEF COMPLAINT: Ventral Hernia with Ischemic Bowel (2021 15:27)      INTERVAL EVENTS/HPI  - No acute events overnight  - T(F): , Max: 98.2 (21 @ 05:12)  - abdominal pain much improved  - WBC stable   - WBC Count: 23.20 (21 @ 08:11)  WBC Count: 23.44 (21 @ 16:55)     - Creatinine, Serum: 0.8 (21 @ 08:11)  Creatinine, Serum: 0.8 (21 @ 16:55)       ROS  General: Denies rigors, nightsweats  HEENT: Denies headache, rhinorrhea, sore throat, eye pain  CV: Denies CP, palpitations  PULM: Denies wheezing, hemoptysis  GI: Denies hematemesis, hematochezia, melena  : Denies discharge, hematuria  MSK: Denies arthralgias, myalgias  SKIN: Denies rash, lesions  NEURO: Denies paresthesias, weakness  PSYCH: Denies depression, anxiety    VITALS:  T(F): 98.2, Max: 98.2 (21 @ 05:12)  HR: 71  BP: 149/65  RR: 18Vital Signs Last 24 Hrs  T(C): 36.8 (2021 13:40), Max: 36.8 (2021 05:12)  T(F): 98.2 (2021 13:40), Max: 98.2 (2021 05:12)  HR: 71 (2021 13:40) (60 - 75)  BP: 149/65 (2021 13:40) (144/69 - 174/85)  BP(mean): --  RR: 18 (2021 13:40) (18 - 18)  SpO2: --    PHYSICAL EXAM:  Gen: NAD, resting in bed  HEENT: Normocephalic, atraumatic  Neck: supple, no lymphadenopathy  CV: Regular rate & regular rhythm  Lungs: decreased BS at bases, no fremitus  Abdomen: Soft, BS present; wound vac in place; drain in left quadrant   Ext: Warm, well perfused  Neuro: non focal, awake  Skin: no rash, no erythema  Lines: no phlebitis    FH: Non-contributory  Social Hx: Non-contributory    TESTS & MEASUREMENTS:                        8.2    23.20 )-----------( 445      ( 2021 08:11 )             24.4         141  |  105  |  20  ----------------------------<  155<H>  4.2   |  26  |  0.8    Ca    8.7      2021 08:11  Phos  3.8       Mg     1.6         TPro  5.3<L>  /  Alb  2.8<L>  /  TBili  0.4  /  DBili  0.2  /  AST  22  /  ALT  23  /  AlkPhos  107      eGFR if Non African American: 82 mL/min/1.73M2 (21 @ 08:11)  eGFR if : 96 mL/min/1.73M2 (21 @ 08:11)    LIVER FUNCTIONS - ( 2021 08:11 )  Alb: 2.8 g/dL / Pro: 5.3 g/dL / ALK PHOS: 107 U/L / ALT: 23 U/L / AST: 22 U/L / GGT: x           Urinalysis Basic - ( 2021 21:18 )    Color: Light Yellow / Appearance: Clear / S.012 / pH: x  Gluc: x / Ketone: Negative  / Bili: Negative / Urobili: <2 mg/dL   Blood: x / Protein: Trace / Nitrite: Negative   Leuk Esterase: Negative / RBC: x / WBC x   Sq Epi: x / Non Sq Epi: x / Bacteria: x        Culture - Acid Fast - Other w/Smear (collected 21 @ 19:00)  Source: .Other None    Culture - Blood (collected 21 @ 17:03)  Source: .Blood None  Preliminary Report (21 @ 03:01):    No growth to date.    Culture - Blood (collected 02-10-21 @ 19:45)  Source: .Blood Blood-Peripheral  Final Report (21 @ 01:01):    No Growth Final            INFECTIOUS DISEASES TESTING  Procalcitonin, Serum: 1.22 (21 @ 17:03)  COVID-19 PCR: NotDetec (02-10-21 @ 19:45)      INFLAMMATORY MARKERS      RADIOLOGY & ADDITIONAL TESTS:  I have personally reviewed the last available Chest xray  CXR  Xray Chest 1 View- PORTABLE-Urgent:   EXAM:  XR CHEST PORTABLE URGENT 1V            PROCEDURE DATE:  2021            INTERPRETATION:  Clinical History/Reason for Exam:  cough    Comparison: XR CHEST 2021.      Findings:    Technique/Positioning:  Frontal portable radiograph of the chest.    Support devices:  Left PICC line, satisfactory position    Cardiac/mediastinum/hilum: Stable    Lung parenchyma/ Pleura: Right greater than left basilar reticular opacities suggesting discoid atelectasis. Low lung volumes. No pneumothorax      Skeleton/soft tissues: No focal skeletal lesions are identified.      Impression:    Right greater than left basilar reticular opacities suggesting discoid atelectasis. Low lung volumes. No pneumothorax                      CLAUDIA MONROE MD; Attending Radiologist  This document has been electronically signed. 2021 12:13PM (21 @ 21:59)      CT  CT Abdomen and Pelvis w/ Oral Cont and w/ IV Cont:   EXAM:  CT ABDOMEN AND PELVIS OC IC            PROCEDURE DATE:  2021            INTERPRETATION:  CLINICAL STATEMENT: Postop elevated white blood cell count. Evaluate for abdominal collection.    TECHNIQUE: Contiguous axial CT images were obtained from the lower chest to the pubic symphysis following administration of 100cc Optiray 320 intravenous contrast.  Oral contrast was administered.  Reformatted images in the coronal and sagittal planes were acquired.    COMPARISON CT: 2/10/2021    OTHER STUDIES USED FOR CORRELATION: Right upper quadrant sonogram from one day earlier.      FINDINGS:    LOWER CHEST: Subsegmental atelectasis at the lung bases bilaterally.    HEPATOBILIARY: Cholelithiasis. No focal liver lesions. Patent portal vein.    SPLEEN: Unremarkable.    PANCREAS: Unremarkable.    ADRENAL GLANDS: Unremarkable.    KIDNEYS: Symmetric bilateral renal enhancement. Bilateral renal cysts. No hydronephrosis bilaterally.    ABDOMINOPELVIC NODES: Unremarkable.    PELVIC ORGANS: Distended urinary bladder. A focus of gas within the urinary bladder antidependently is likely from instrumentation.    PERITONEUM/MESENTERY/BOWEL: Postsurgical pneumoperitoneum noted. Postsurgical changes from midline laparotomy noted with superficial drain in place in the midline. Status post bowel resection with suture material seen in the right abdomen on series 2 image 55. No intra-abdominal fluid collection. Mild hemorrhagic fluid/fat stranding seen in the right lower quadrant on series 601 image 22. No bowel obstruction. Oral contrast is seen to the level of the rectum.    BONES/SOFT TISSUES: Unremarkable.      IMPRESSION:    Postsurgical appearance of the abdomen status post bowel resection. No intra-abdominal fluid collection. Mild hemorrhagic fluid/fat stranding seen in the right lower quadrant.    Distended urinary bladder.              JESICA ARIAS MD; Attending Radiologist  This document has been electronically signed. 2021  2:36PM (21 @ 13:42)      CARDIOLOGY TESTING  12 Lead ECG:   Ventricular Rate 81 BPM    Atrial Rate 81 BPM    P-R Interval 126 ms    QRS Duration 96 ms    Q-T Interval 406 ms    QTC Calculation(Bazett) 471 ms    P Axis 42 degrees    R Axis -12 degrees    T Axis 36 degrees    Diagnosis Line Normal sinus rhythm  Moderate voltage criteria for LVH, may be normal variant  Borderline ECG    Confirmed by MOMO DAN MD ([id number]) on 2021 6:06:04 AM (21 @ 22:11)  12 Lead ECG:   Ventricular Rate 88 BPM    Atrial Rate 88 BPM    P-R Interval 120 ms    QRS Duration 96 ms    Q-T Interval 376 ms    QTC Calculation(Bazett) 454 ms    P Axis 54 degrees    R Axis 30 degrees    T Axis 64 degrees    Diagnosis Line *** Poor data quality, interpretation may be adversely affected  Sinus rhythm with occasional Premature ventricular complexes  Otherwise normal ECG    Confirmed by Karthik Mcnamara ([id number]) on 2021 3:55:17 PM (21 @ 14:04)      MEDICATIONS  ATENolol  Tablet 100 Oral daily  atorvastatin 40 Oral at bedtime  chlorhexidine 4% Liquid 1 Topical daily  fat emulsion (Fish Oil and Plant Based) 20% Infusion 1.48 IV Continuous <Continuous>  heparin   Injectable 5000 SubCutaneous every 8 hours  insulin glargine Injectable (LANTUS) 30 SubCutaneous at bedtime  insulin regular  human corrective regimen sliding scale  SubCutaneous every 6 hours  lactobacillus acidophilus 1 Oral daily  magnesium sulfate  IVPB 2 IV Intermittent once  pantoprazole    Tablet 40 Oral daily  Parenteral Nutrition - Adult 1 TPN Continuous <Continuous>  Parenteral Nutrition - Adult 1 TPN Continuous <Continuous>  piperacillin/tazobactam IVPB.. 3.375 IV Intermittent every 8 hours  saccharomyces boulardii 250 Oral two times a day  tamsulosin 0.4 Oral at bedtime      WEIGHT  Weight (kg): 77.7 (21 @ 01:27)  Creatinine, Serum: 0.8 mg/dL (21 @ 08:11)      ANTIBIOTICS:  piperacillin/tazobactam IVPB.. 3.375 Gram(s) IV Intermittent every 8 hours      All available historical records have been reviewed

## 2021-02-19 NOTE — PROGRESS NOTE ADULT - ASSESSMENT
ASSESSMENT    55y female with a past medical hx of HTN, Hyperlipidemia, DM, presents to the ED complaining of increased pain from a chronic umbilical hernia found to have SBO with incarcerated bowel   #Incarcerated Hernia  - CT Abd/Pelvis 2/10 showing large ventral hernia with SBO at level of hernia, concern for ischemic bowel   - S/p ex lap 2/11 found to have frankly necrotic small bowel, 164 cm resected with ischemic ascending colon and cecum, thrombosed mesentery, large complex ventral hernia, side to side anastomososis  -  CT Abd/Pelvis 2/17 with post surgical appearance s/p bowel resection without intra-abdominal fluid collection, mild hemorrhagic and fat stranding in RLQ   - s/p IR guided cholecystostomy catheter placement 2/19 -- black tarry bile seen    #Leukocytosis   #HLD  #Obesity BMI (kg/m2): 31.3  #DM   #Abx allergy: NKDA      RECOMMENDATIONS  - continue zosyn 3.375 mg q 8 hours  - trend WBC  - follow-up biliary cultures    Please call or message on [employer] Teams if with any questions.  Spectra [phone]

## 2021-02-19 NOTE — PROGRESS NOTE ADULT - ASSESSMENT
55 years old female admitted due to incarcerated umbilical hernia, POD#6, S/P Exploratory laparotomy, small bowel resection, right hemicolectomy. Currently patient stable, on NPO with TPN, pain controlled, having BM, No fever, Had an increased in WBC 22.1, UA negative, blood culture was sent, US RUQ cholelithiasis w/o signs of cholecystitis. VAC working no leaks    Plan    - C/w TPN  - C/w PO medications   - Monitor Vital signs  - Pain control as needed  - Pending Blood culture results  - GI and DVT prophylaxis  - Encourage ambulation and IS   - Continue current management   55 years old female admitted due to incarcerated umbilical hernia, POD#6, S/P Exploratory laparotomy, small bowel resection, right hemicolectomy. Currently patient stable, on NPO with TPN, pain controlled, having BM, No fever, Had an increased in WBC 22.1, UA negative,  US RUQ cholelithiasis w/o signs of cholecystitis. VAC working no leaks  sp IR PLACEMENT OF PERCHOLI 2/18      Plan  - WOUND VAC CHANGE 2/19  - ABX ZOSYN  - HUDSON IS IN + FLOMAX   - C/w TPN  - C/w PO medications   - Monitor Vital signs  - Pain control as needed  - GI and DVT prophylaxis  - Encourage ambulation and IS   - Continue current management

## 2021-02-19 NOTE — PROGRESS NOTE ADULT - ASSESSMENT
Interventional Radiology Follow- Up Note      Vitals: T(F): 98.2 (02-19-21 @ 13:40), Max: 98.7 (02-18-21 @ 17:02)  HR: 71 (02-19-21 @ 13:40) (60 - 75)  BP: 149/65 (02-19-21 @ 13:40) (141/72 - 174/85)  RR: 18 (02-19-21 @ 13:40) (18 - 18)  SpO2: --  Wt(kg): --    LABS:                        8.2    23.20 )-----------( 445      ( 19 Feb 2021 08:11 )             24.4     02-19    141  |  105  |  20  ----------------------------<  155<H>  4.2   |  26  |  0.8    Ca    8.7      19 Feb 2021 08:11  Phos  3.8     02-19  Mg     1.6     02-19    TPro  5.3<L>  /  Alb  2.8<L>  /  TBili  0.4  /  DBili  0.2  /  AST  22  /  ALT  23  /  AlkPhos  107  02-19      Impression:   55 years old female admitted due to incarcerated umbilical hernia. s/p Exploratory laparotomy, small bowel resection, right hemicolectomy.     Pt seen and examined at bedside.   States she feels much better and currently has no complaints.   AKANKSHA drain has ~50cc bilious/bloody drainage       Please call Interventional Radiology [phone] with any questions, concerns, or issues regarding above.

## 2021-02-19 NOTE — PROGRESS NOTE ADULT - ASSESSMENT
Interventional Radiology Follow- Up Note    Vitals: T(F): 98.2 (02-19-21 @ 05:12), Max: 98.7 (02-18-21 @ 17:02)  HR: 75 (02-19-21 @ 05:12) (61 - 75)  BP: 144/69 (02-19-21 @ 05:12) (134/70 - 174/85)  RR: 18 (02-19-21 @ 05:12) (18 - 18)  SpO2: --  Wt(kg): --    LABS:                        8.4    23.44 )-----------( 431      ( 18 Feb 2021 16:55 )             24.7     02-18    139  |  103  |  24<H>  ----------------------------<  126<H>  4.2   |  26  |  0.8    Ca    8.2<L>      18 Feb 2021 16:55  Phos  3.9     02-18  Mg     1.4     02-18      Impression:   55 years old female admitted due to incarcerated umbilical hernia. s/p Exploratory laparotomy, small bowel resection, right hemicolectomy.     s/p Perc zack 2/18  Approximately 150cc of black tarry bile was drained during procedure.  Will continue to follow.    Please call Interventional Radiology [phone] with any questions, concerns, or issues regarding above.

## 2021-02-20 LAB
ALBUMIN SERPL ELPH-MCNC: 3 G/DL — LOW (ref 3.5–5.2)
ALP SERPL-CCNC: 99 U/L — SIGNIFICANT CHANGE UP (ref 30–115)
ALT FLD-CCNC: 20 U/L — SIGNIFICANT CHANGE UP (ref 0–41)
ANION GAP SERPL CALC-SCNC: 12 MMOL/L — SIGNIFICANT CHANGE UP (ref 7–14)
AST SERPL-CCNC: 24 U/L — SIGNIFICANT CHANGE UP (ref 0–41)
BASOPHILS # BLD AUTO: 0.1 K/UL — SIGNIFICANT CHANGE UP (ref 0–0.2)
BASOPHILS NFR BLD AUTO: 0.5 % — SIGNIFICANT CHANGE UP (ref 0–1)
BILIRUB DIRECT SERPL-MCNC: 0.2 MG/DL — SIGNIFICANT CHANGE UP (ref 0–0.2)
BILIRUB INDIRECT FLD-MCNC: 0.2 MG/DL — SIGNIFICANT CHANGE UP (ref 0.2–1.2)
BILIRUB SERPL-MCNC: 0.4 MG/DL — SIGNIFICANT CHANGE UP (ref 0.2–1.2)
BUN SERPL-MCNC: 17 MG/DL — SIGNIFICANT CHANGE UP (ref 10–20)
CALCIUM SERPL-MCNC: 8.1 MG/DL — LOW (ref 8.5–10.1)
CHLORIDE SERPL-SCNC: 104 MMOL/L — SIGNIFICANT CHANGE UP (ref 98–110)
CO2 SERPL-SCNC: 24 MMOL/L — SIGNIFICANT CHANGE UP (ref 17–32)
CREAT SERPL-MCNC: 0.8 MG/DL — SIGNIFICANT CHANGE UP (ref 0.7–1.5)
EOSINOPHIL # BLD AUTO: 0.59 K/UL — SIGNIFICANT CHANGE UP (ref 0–0.7)
EOSINOPHIL NFR BLD AUTO: 2.7 % — SIGNIFICANT CHANGE UP (ref 0–8)
GLUCOSE BLDC GLUCOMTR-MCNC: 135 MG/DL — HIGH (ref 70–99)
GLUCOSE BLDC GLUCOMTR-MCNC: 156 MG/DL — HIGH (ref 70–99)
GLUCOSE BLDC GLUCOMTR-MCNC: 159 MG/DL — HIGH (ref 70–99)
GLUCOSE BLDC GLUCOMTR-MCNC: 160 MG/DL — HIGH (ref 70–99)
GLUCOSE SERPL-MCNC: 128 MG/DL — HIGH (ref 70–99)
HCT VFR BLD CALC: 24.1 % — LOW (ref 37–47)
HGB BLD-MCNC: 8.2 G/DL — LOW (ref 12–16)
IMM GRANULOCYTES NFR BLD AUTO: 6.8 % — HIGH (ref 0.1–0.3)
LYMPHOCYTES # BLD AUTO: 2.04 K/UL — SIGNIFICANT CHANGE UP (ref 1.2–3.4)
LYMPHOCYTES # BLD AUTO: 9.4 % — LOW (ref 20.5–51.1)
MAGNESIUM SERPL-MCNC: 2.2 MG/DL — SIGNIFICANT CHANGE UP (ref 1.8–2.4)
MCHC RBC-ENTMCNC: 30.9 PG — SIGNIFICANT CHANGE UP (ref 27–31)
MCHC RBC-ENTMCNC: 34 G/DL — SIGNIFICANT CHANGE UP (ref 32–37)
MCV RBC AUTO: 90.9 FL — SIGNIFICANT CHANGE UP (ref 81–99)
MONOCYTES # BLD AUTO: 1.19 K/UL — HIGH (ref 0.1–0.6)
MONOCYTES NFR BLD AUTO: 5.5 % — SIGNIFICANT CHANGE UP (ref 1.7–9.3)
NEUTROPHILS # BLD AUTO: 16.35 K/UL — HIGH (ref 1.4–6.5)
NEUTROPHILS NFR BLD AUTO: 75.1 % — SIGNIFICANT CHANGE UP (ref 42.2–75.2)
NRBC # BLD: 0 /100 WBCS — SIGNIFICANT CHANGE UP (ref 0–0)
PHOSPHATE SERPL-MCNC: 3.6 MG/DL — SIGNIFICANT CHANGE UP (ref 2.1–4.9)
PLATELET # BLD AUTO: 448 K/UL — HIGH (ref 130–400)
POTASSIUM SERPL-MCNC: 4.1 MMOL/L — SIGNIFICANT CHANGE UP (ref 3.5–5)
POTASSIUM SERPL-SCNC: 4.1 MMOL/L — SIGNIFICANT CHANGE UP (ref 3.5–5)
PROCALCITONIN SERPL-MCNC: 0.47 NG/ML — HIGH (ref 0.02–0.1)
PROT SERPL-MCNC: 5.4 G/DL — LOW (ref 6–8)
RBC # BLD: 2.65 M/UL — LOW (ref 4.2–5.4)
RBC # FLD: 15 % — HIGH (ref 11.5–14.5)
SODIUM SERPL-SCNC: 140 MMOL/L — SIGNIFICANT CHANGE UP (ref 135–146)
WBC # BLD: 21.76 K/UL — HIGH (ref 4.8–10.8)
WBC # FLD AUTO: 21.76 K/UL — HIGH (ref 4.8–10.8)

## 2021-02-20 RX ORDER — ACETAMINOPHEN 500 MG
650 TABLET ORAL ONCE
Refills: 0 | Status: COMPLETED | OUTPATIENT
Start: 2021-02-20 | End: 2021-02-21

## 2021-02-20 RX ORDER — ELECTROLYTE SOLUTION,INJ
1 VIAL (ML) INTRAVENOUS
Refills: 0 | Status: DISCONTINUED | OUTPATIENT
Start: 2021-02-20 | End: 2021-02-20

## 2021-02-20 RX ORDER — MULTIVIT-MIN/FERROUS GLUCONATE 9 MG/15 ML
1 LIQUID (ML) ORAL DAILY
Refills: 0 | Status: DISCONTINUED | OUTPATIENT
Start: 2021-02-20 | End: 2021-02-22

## 2021-02-20 RX ADMIN — Medication 250 MILLIGRAM(S): at 05:49

## 2021-02-20 RX ADMIN — PIPERACILLIN AND TAZOBACTAM 25 GRAM(S): 4; .5 INJECTION, POWDER, LYOPHILIZED, FOR SOLUTION INTRAVENOUS at 12:26

## 2021-02-20 RX ADMIN — HEPARIN SODIUM 5000 UNIT(S): 5000 INJECTION INTRAVENOUS; SUBCUTANEOUS at 21:15

## 2021-02-20 RX ADMIN — INSULIN HUMAN 3: 100 INJECTION, SOLUTION SUBCUTANEOUS at 18:12

## 2021-02-20 RX ADMIN — OXYCODONE HYDROCHLORIDE 5 MILLIGRAM(S): 5 TABLET ORAL at 22:15

## 2021-02-20 RX ADMIN — ATORVASTATIN CALCIUM 40 MILLIGRAM(S): 80 TABLET, FILM COATED ORAL at 21:15

## 2021-02-20 RX ADMIN — HEPARIN SODIUM 5000 UNIT(S): 5000 INJECTION INTRAVENOUS; SUBCUTANEOUS at 05:52

## 2021-02-20 RX ADMIN — ATENOLOL 100 MILLIGRAM(S): 25 TABLET ORAL at 05:51

## 2021-02-20 RX ADMIN — PIPERACILLIN AND TAZOBACTAM 25 GRAM(S): 4; .5 INJECTION, POWDER, LYOPHILIZED, FOR SOLUTION INTRAVENOUS at 21:13

## 2021-02-20 RX ADMIN — INSULIN HUMAN 3: 100 INJECTION, SOLUTION SUBCUTANEOUS at 00:26

## 2021-02-20 RX ADMIN — INSULIN GLARGINE 30 UNIT(S): 100 INJECTION, SOLUTION SUBCUTANEOUS at 21:14

## 2021-02-20 RX ADMIN — TAMSULOSIN HYDROCHLORIDE 0.4 MILLIGRAM(S): 0.4 CAPSULE ORAL at 21:15

## 2021-02-20 RX ADMIN — Medication 1 EACH: at 21:15

## 2021-02-20 RX ADMIN — INSULIN HUMAN 3: 100 INJECTION, SOLUTION SUBCUTANEOUS at 12:34

## 2021-02-20 RX ADMIN — PANTOPRAZOLE SODIUM 40 MILLIGRAM(S): 20 TABLET, DELAYED RELEASE ORAL at 12:26

## 2021-02-20 RX ADMIN — HEPARIN SODIUM 5000 UNIT(S): 5000 INJECTION INTRAVENOUS; SUBCUTANEOUS at 12:26

## 2021-02-20 RX ADMIN — Medication 1 TABLET(S): at 12:26

## 2021-02-20 RX ADMIN — PIPERACILLIN AND TAZOBACTAM 25 GRAM(S): 4; .5 INJECTION, POWDER, LYOPHILIZED, FOR SOLUTION INTRAVENOUS at 05:49

## 2021-02-20 RX ADMIN — Medication 250 MILLIGRAM(S): at 17:53

## 2021-02-20 RX ADMIN — Medication 1 TABLET(S): at 12:27

## 2021-02-20 NOTE — PROGRESS NOTE ADULT - ASSESSMENT
55 years old female admitted due to incarcerated umbilical hernia, POD#6, S/P Exploratory laparotomy, small bowel resection, right hemicolectomy. Currently patient stable, on NPO with TPN, pain controlled, having BM, No fever, Had an increased in WBC 22.1, UA negative,  US RUQ cholelithiasis w/o signs of cholecystitis. VAC working no leaks  sp IR PLACEMENT OF PERCHOLI 2/18      Plan  - fu bile culture   - WOUND VAC CHANGE 2/22  - ABX ZOSYN  - HUDSON to be removed today   - C/w TPN  - C/w PO medications   - Monitor Vital signs  - Pain control as needed  - GI and DVT prophylaxis  - Encourage ambulation and IS   - Continue current management

## 2021-02-20 NOTE — PROGRESS NOTE ADULT - SUBJECTIVE AND OBJECTIVE BOX
GENERAL SURGERY PROGRESS NOTE     MELISSA CARUSO  55y  Female  Hospital day :10d  POD:  Procedure: Negative pressure wound therapy, greater than 50 sq cm    Open repair of strangulated ventral hernia    Abdominal washout    Right hemicolectomy    Resection of small bowel    Exploratory laparotomy      OVERNIGHT EVENTS: silver in , diet advanced to cld , gas+ BM+     T(F): 98.4 (02-20-21 @ 04:30), Max: 98.4 (02-20-21 @ 04:30)  HR: 63 (02-20-21 @ 04:30) (60 - 71)  BP: 138/69 (02-20-21 @ 04:30) (136/70 - 152/69)  ABP: --  ABP(mean): --  RR: 18 (02-20-21 @ 04:30) (18 - 18)  SpO2: --    DIET/FLUIDS: fat emulsion (Fish Oil and Plant Based) 20% Infusion 1.48 Gm/kG/Day IV Continuous <Continuous>  Parenteral Nutrition - Adult 1 Each TPN Continuous <Continuous>    NG:                                                                                DRAINS:   02-19-21 @ 07:01  -  02-20-21 @ 07:00  --------------------------------------------------------  OUT: 55 mL      BM:     EMESIS:     URINE:   02-19-21 @ 07:01  -  02-20-21 @ 07:00  --------------------------------------------------------  OUT: 2200 mL       GI proph:  pantoprazole    Tablet 40 milliGRAM(s) Oral daily    AC/ proph: heparin   Injectable 5000 Unit(s) SubCutaneous every 8 hours    ABx: piperacillin/tazobactam IVPB.. 3.375 Gram(s) IV Intermittent every 8 hours      PHYSICAL EXAM:  GENERAL: NAD, well-appearing  CHEST/LUNG: Clear to auscultation bilaterally  HEART: Regular rate and rhythm  ABDOMEN: Soft, Nontender, Nondistended; WOUND VAC IN PLACE   EXTREMITIES:  No clubbing, cyanosis, or edema    LABS  Labs:  CAPILLARY BLOOD GLUCOSE      POCT Blood Glucose.: 135 mg/dL (20 Feb 2021 06:02)  POCT Blood Glucose.: 159 mg/dL (20 Feb 2021 00:02)  POCT Blood Glucose.: 106 mg/dL (19 Feb 2021 20:46)  POCT Blood Glucose.: 141 mg/dL (19 Feb 2021 17:59)  POCT Blood Glucose.: 121 mg/dL (19 Feb 2021 12:12)                          8.2    23.20 )-----------( 445      ( 19 Feb 2021 08:11 )             24.4       Auto Neutrophil %: 74.0 % (02-19-21 @ 08:11)  Auto Immature Granulocyte %: 9.5 % (02-19-21 @ 08:11)    02-19    141  |  105  |  20  ----------------------------<  155<H>  4.2   |  26  |  0.8      Calcium, Total Serum: 8.7 mg/dL (02-19-21 @ 08:11)      LFTs:             5.3  | 0.4  | 22       ------------------[107     ( 19 Feb 2021 08:11 )  2.8  | 0.2  | 23          Lipase:x      Amylase:x             Coags:                Culture - Acid Fast - Other w/Smear (collected 18 Feb 2021 19:00)  Source: .Other None    Culture - Abscess with Gram Stain (collected 18 Feb 2021 19:00)  Source: .Abscess None  Preliminary Report (19 Feb 2021 19:53):    No growth    Culture - Blood (collected 18 Feb 2021 16:55)  Source: .Blood None  Preliminary Report (20 Feb 2021 01:02):    No growth to date.          RADIOLOGY & ADDITIONAL TESTS:      A/P

## 2021-02-21 LAB
ALBUMIN SERPL ELPH-MCNC: 3.4 G/DL — LOW (ref 3.5–5.2)
ALP SERPL-CCNC: 104 U/L — SIGNIFICANT CHANGE UP (ref 30–115)
ALT FLD-CCNC: 16 U/L — SIGNIFICANT CHANGE UP (ref 0–41)
ANION GAP SERPL CALC-SCNC: 12 MMOL/L — SIGNIFICANT CHANGE UP (ref 7–14)
AST SERPL-CCNC: 16 U/L — SIGNIFICANT CHANGE UP (ref 0–41)
BASOPHILS # BLD AUTO: 0.09 K/UL — SIGNIFICANT CHANGE UP (ref 0–0.2)
BASOPHILS NFR BLD AUTO: 0.5 % — SIGNIFICANT CHANGE UP (ref 0–1)
BILIRUB DIRECT SERPL-MCNC: 0.2 MG/DL — SIGNIFICANT CHANGE UP (ref 0–0.2)
BILIRUB INDIRECT FLD-MCNC: 0.3 MG/DL — SIGNIFICANT CHANGE UP (ref 0.2–1.2)
BILIRUB SERPL-MCNC: 0.5 MG/DL — SIGNIFICANT CHANGE UP (ref 0.2–1.2)
BUN SERPL-MCNC: 21 MG/DL — HIGH (ref 10–20)
CALCIUM SERPL-MCNC: 8 MG/DL — LOW (ref 8.5–10.1)
CHLORIDE SERPL-SCNC: 106 MMOL/L — SIGNIFICANT CHANGE UP (ref 98–110)
CO2 SERPL-SCNC: 22 MMOL/L — SIGNIFICANT CHANGE UP (ref 17–32)
CREAT SERPL-MCNC: 0.8 MG/DL — SIGNIFICANT CHANGE UP (ref 0.7–1.5)
EOSINOPHIL # BLD AUTO: 0.48 K/UL — SIGNIFICANT CHANGE UP (ref 0–0.7)
EOSINOPHIL NFR BLD AUTO: 2.6 % — SIGNIFICANT CHANGE UP (ref 0–8)
GLUCOSE BLDC GLUCOMTR-MCNC: 129 MG/DL — HIGH (ref 70–99)
GLUCOSE BLDC GLUCOMTR-MCNC: 145 MG/DL — HIGH (ref 70–99)
GLUCOSE BLDC GLUCOMTR-MCNC: 153 MG/DL — HIGH (ref 70–99)
GLUCOSE BLDC GLUCOMTR-MCNC: 156 MG/DL — HIGH (ref 70–99)
GLUCOSE BLDC GLUCOMTR-MCNC: 156 MG/DL — HIGH (ref 70–99)
GLUCOSE BLDC GLUCOMTR-MCNC: 167 MG/DL — HIGH (ref 70–99)
GLUCOSE SERPL-MCNC: 108 MG/DL — HIGH (ref 70–99)
HCT VFR BLD CALC: 26.5 % — LOW (ref 37–47)
HGB BLD-MCNC: 8.5 G/DL — LOW (ref 12–16)
IMM GRANULOCYTES NFR BLD AUTO: 3.6 % — HIGH (ref 0.1–0.3)
LYMPHOCYTES # BLD AUTO: 16.2 % — LOW (ref 20.5–51.1)
LYMPHOCYTES # BLD AUTO: 2.93 K/UL — SIGNIFICANT CHANGE UP (ref 1.2–3.4)
MAGNESIUM SERPL-MCNC: 2.1 MG/DL — SIGNIFICANT CHANGE UP (ref 1.8–2.4)
MCHC RBC-ENTMCNC: 30 PG — SIGNIFICANT CHANGE UP (ref 27–31)
MCHC RBC-ENTMCNC: 32.1 G/DL — SIGNIFICANT CHANGE UP (ref 32–37)
MCV RBC AUTO: 93.6 FL — SIGNIFICANT CHANGE UP (ref 81–99)
MONOCYTES # BLD AUTO: 1.13 K/UL — HIGH (ref 0.1–0.6)
MONOCYTES NFR BLD AUTO: 6.2 % — SIGNIFICANT CHANGE UP (ref 1.7–9.3)
NEUTROPHILS # BLD AUTO: 12.86 K/UL — HIGH (ref 1.4–6.5)
NEUTROPHILS NFR BLD AUTO: 70.9 % — SIGNIFICANT CHANGE UP (ref 42.2–75.2)
NRBC # BLD: 0 /100 WBCS — SIGNIFICANT CHANGE UP (ref 0–0)
PHOSPHATE SERPL-MCNC: 3.8 MG/DL — SIGNIFICANT CHANGE UP (ref 2.1–4.9)
PLATELET # BLD AUTO: 478 K/UL — HIGH (ref 130–400)
POTASSIUM SERPL-MCNC: 4.3 MMOL/L — SIGNIFICANT CHANGE UP (ref 3.5–5)
POTASSIUM SERPL-SCNC: 4.3 MMOL/L — SIGNIFICANT CHANGE UP (ref 3.5–5)
PROT SERPL-MCNC: 6.1 G/DL — SIGNIFICANT CHANGE UP (ref 6–8)
RBC # BLD: 2.83 M/UL — LOW (ref 4.2–5.4)
RBC # FLD: 15.5 % — HIGH (ref 11.5–14.5)
SODIUM SERPL-SCNC: 140 MMOL/L — SIGNIFICANT CHANGE UP (ref 135–146)
WBC # BLD: 18.14 K/UL — HIGH (ref 4.8–10.8)
WBC # FLD AUTO: 18.14 K/UL — HIGH (ref 4.8–10.8)

## 2021-02-21 RX ORDER — SODIUM CHLORIDE 9 MG/ML
1000 INJECTION, SOLUTION INTRAVENOUS
Refills: 0 | Status: DISCONTINUED | OUTPATIENT
Start: 2021-02-21 | End: 2021-02-21

## 2021-02-21 RX ORDER — HYDROMORPHONE HYDROCHLORIDE 2 MG/ML
0.5 INJECTION INTRAMUSCULAR; INTRAVENOUS; SUBCUTANEOUS ONCE
Refills: 0 | Status: DISCONTINUED | OUTPATIENT
Start: 2021-02-21 | End: 2021-02-21

## 2021-02-21 RX ADMIN — SODIUM CHLORIDE 40 MILLILITER(S): 9 INJECTION, SOLUTION INTRAVENOUS at 21:27

## 2021-02-21 RX ADMIN — PIPERACILLIN AND TAZOBACTAM 25 GRAM(S): 4; .5 INJECTION, POWDER, LYOPHILIZED, FOR SOLUTION INTRAVENOUS at 21:27

## 2021-02-21 RX ADMIN — HEPARIN SODIUM 5000 UNIT(S): 5000 INJECTION INTRAVENOUS; SUBCUTANEOUS at 21:28

## 2021-02-21 RX ADMIN — INSULIN GLARGINE 30 UNIT(S): 100 INJECTION, SOLUTION SUBCUTANEOUS at 21:28

## 2021-02-21 RX ADMIN — CHLORHEXIDINE GLUCONATE 1 APPLICATION(S): 213 SOLUTION TOPICAL at 11:32

## 2021-02-21 RX ADMIN — Medication 1 TABLET(S): at 11:32

## 2021-02-21 RX ADMIN — PIPERACILLIN AND TAZOBACTAM 25 GRAM(S): 4; .5 INJECTION, POWDER, LYOPHILIZED, FOR SOLUTION INTRAVENOUS at 12:31

## 2021-02-21 RX ADMIN — INSULIN HUMAN 3: 100 INJECTION, SOLUTION SUBCUTANEOUS at 23:56

## 2021-02-21 RX ADMIN — Medication 250 MILLIGRAM(S): at 17:17

## 2021-02-21 RX ADMIN — HEPARIN SODIUM 5000 UNIT(S): 5000 INJECTION INTRAVENOUS; SUBCUTANEOUS at 05:55

## 2021-02-21 RX ADMIN — PIPERACILLIN AND TAZOBACTAM 25 GRAM(S): 4; .5 INJECTION, POWDER, LYOPHILIZED, FOR SOLUTION INTRAVENOUS at 05:52

## 2021-02-21 RX ADMIN — ATORVASTATIN CALCIUM 40 MILLIGRAM(S): 80 TABLET, FILM COATED ORAL at 21:27

## 2021-02-21 RX ADMIN — INSULIN HUMAN 3: 100 INJECTION, SOLUTION SUBCUTANEOUS at 00:29

## 2021-02-21 RX ADMIN — PANTOPRAZOLE SODIUM 40 MILLIGRAM(S): 20 TABLET, DELAYED RELEASE ORAL at 11:32

## 2021-02-21 RX ADMIN — Medication 250 MILLIGRAM(S): at 05:55

## 2021-02-21 RX ADMIN — TAMSULOSIN HYDROCHLORIDE 0.4 MILLIGRAM(S): 0.4 CAPSULE ORAL at 21:27

## 2021-02-21 RX ADMIN — HEPARIN SODIUM 5000 UNIT(S): 5000 INJECTION INTRAVENOUS; SUBCUTANEOUS at 12:31

## 2021-02-21 RX ADMIN — Medication 650 MILLIGRAM(S): at 02:36

## 2021-02-21 RX ADMIN — INSULIN HUMAN 3: 100 INJECTION, SOLUTION SUBCUTANEOUS at 12:30

## 2021-02-21 RX ADMIN — ATENOLOL 100 MILLIGRAM(S): 25 TABLET ORAL at 05:54

## 2021-02-21 NOTE — PROGRESS NOTE ADULT - SUBJECTIVE AND OBJECTIVE BOX
GENERAL SURGERY PROGRESS NOTE     MELISSA CARUSO  55y  Female  Hospital day :11d  POD:  Procedure: Negative pressure wound therapy, greater than 50 sq cm    Open repair of strangulated ventral hernia    Abdominal washout    Right hemicolectomy    Resection of small bowel    Exploratory laparotomy      OVERNIGHT EVENTS: WBC to 21. passed TOV, having BM, W[place] changed     T(F): 97.6 (02-21-21 @ 05:00), Max: 98 (02-20-21 @ 13:25)  HR: 64 (02-21-21 @ 05:00) (54 - 64)  BP: 139/68 (02-21-21 @ 05:00) (139/68 - 169/76)  ABP: --  ABP(mean): --  RR: 20 (02-21-21 @ 05:00) (18 - 20)  SpO2: --    DIET/FLUIDS: multivitamin/minerals 1 Tablet(s) Oral daily  Parenteral Nutrition - Adult 1 Each TPN Continuous <Continuous>    NG:                                                                                DRAINS:   02-20-21 @ 07:01  -  02-21-21 @ 07:00  --------------------------------------------------------  OUT: 105 mL      BM:     EMESIS:     URINE:   02-20-21 @ 07:01  -  02-21-21 @ 07:00  --------------------------------------------------------  OUT: 1000 mL       GI proph:  pantoprazole    Tablet 40 milliGRAM(s) Oral daily    AC/ proph: heparin   Injectable 5000 Unit(s) SubCutaneous every 8 hours    ABx: piperacillin/tazobactam IVPB.. 3.375 Gram(s) IV Intermittent every 8 hours      PHYSICAL EXAM:  GENERAL: NAD, well-appearing  CHEST/LUNG: Clear to auscultation bilaterally  HEART: Regular rate and rhythm  ABDOMEN: Soft, Nontender, Nondistended; WV in place.   EXTREMITIES:  No clubbing, cyanosis, or edema      LABS  Labs:  CAPILLARY BLOOD GLUCOSE      POCT Blood Glucose.: 129 mg/dL (21 Feb 2021 05:53)  POCT Blood Glucose.: 156 mg/dL (21 Feb 2021 00:01)  POCT Blood Glucose.: 156 mg/dL (20 Feb 2021 17:59)  POCT Blood Glucose.: 160 mg/dL (20 Feb 2021 12:28)                          8.2    21.76 )-----------( 448      ( 20 Feb 2021 06:40 )             24.1         02-20    140  |  104  |  17  ----------------------------<  128<H>  4.1   |  24  |  0.8          LFTs:             5.4  | 0.4  | 24       ------------------[99      ( 20 Feb 2021 06:40 )  3.0  | 0.2  | 20          Lipase:x      Amylase:x             Coags:                Culture - Acid Fast - Other w/Smear (collected 18 Feb 2021 19:00)  Source: .Other None  Preliminary Report (20 Feb 2021 15:04):    Culture is being performed.    Culture - Abscess with Gram Stain (collected 18 Feb 2021 19:00)  Source: .Abscess None  Preliminary Report (19 Feb 2021 19:53):    No growth    Culture - Blood (collected 18 Feb 2021 16:55)  Source: .Blood None  Preliminary Report (20 Feb 2021 01:02):    No growth to date.          RADIOLOGY & ADDITIONAL TESTS:      A/P  monitor vitals  bowel function  labs and repletions  wean TPN  dispo planning monday       Vero Motley(Resident)

## 2021-02-21 NOTE — CONSULT NOTE ADULT - REASON FOR ADMISSION
Ventral Hernia with Ischemic Bowel

## 2021-02-21 NOTE — CONSULT NOTE ADULT - SUBJECTIVE AND OBJECTIVE BOX
MELISSA CARUSO [id number]  55y Female      HPI:  55y female with a past medical hx of HTN, Hyperlipidemia, DM, presents to the ED complaining of increased pain from a chronic umbilical hernia. She states that she first noticed this hernia about 20 years ago, immediately after a tubal ligation procedure, and it has been slowly enlarging since. She states that it always has been uncomfortable, but never this painful. The severe pain is rated 10/10 and is accompanied with nausea and vomiting, of 3 days duration. She states she is not passing gas and that her last bowel movement was 1 week ago.     PAST MEDICAL & SURGICAL HISTORY:  as mentioned above    No significant past surgical history  Tubal Ligation 20 years ago        MEDICATIONS  (STANDING):    MEDICATIONS  (PRN):      Allergies    No Known Allergies    Intolerances        REVIEW OF SYSTEMS    [x] A ten-point review of systems was otherwise negative except as noted.  [ ] Due to altered mental status/intubation, subjective information were not able to be obtained from the patient. History was obtained, to the extent possible, from review of the chart and collateral sources of information.      Vital Signs Last 24 Hrs  T(C): 36.7 (10 Feb 2021 15:50), Max: 36.7 (10 Feb 2021 15:50)  T(F): 98 (10 Feb 2021 15:50), Max: 98 (10 Feb 2021 15:50)  HR: 104 (10 Feb 2021 15:50) (104 - 104)  BP: 128/93 (10 Feb 2021 15:50) (128/93 - 128/93)  BP(mean): --  RR: 18 (10 Feb 2021 15:50) (18 - 18)  SpO2: 97% (10 Feb 2021 15:50) (97% - 97%)    PHYSICAL EXAM:  GENERAL: in mild distress, alert and oriented to person/place/time  CHEST/LUNG: Clear to auscultation bilaterally  HEART: Regular rate and rhythm by radial pulse  ABDOMEN: Soft, +large umbilical hernia appearing to contain small bowel, Mildly tender with no overlying erythema and is non-reducible.  EXTREMITIES:  No clubbing, cyanosis, or edema. Extremities are cool to touch      LABS: pending  Labs:  CAPILLARY BLOOD GLUCOSE                      LFTs: pending         Coags: pending                    RADIOLOGY & ADDITIONAL STUDIES:  pending
UROLOGY:   55y female with a past medical hx of HTN, Hyperlipidemia, DM, presents to the ED complaining of increased pain from a chronic umbilical hernia. Pt s/p tubal ligation 20 years ago and POD# 10 exploratory laparotomy, small bowel resection, right hemicolectomy. Patient laying on bed comfortable and not in acute distress. Patient states that she was able to urinate prior to the surgery, but ever since she came out she is peeing very little. Patient admits to frequency (10x a day) and nocturia (2x a night).      HPI:  55y female with a past medical hx of HTN, Hyperlipidemia, DM, presents to the ED complaining of increased pain from a chronic umbilical hernia. She states that she first noticed this hernia about 20 years ago, immediately after a tubal ligation procedure, and it has been slowly enlarging since. She states that it always has been uncomfortable, but never this painful. The severe pain is rated 10/10 and is accompanied with nausea and vomiting, of 3 days duration. She states she is not passing gas and that her last bowel movement was 1 week ago.  (10 Feb 2021 22:33)    [ x ] A 10 Point Review of Systems was negative except where noted above    PAST MEDICAL & SURGICAL HISTORY:  Marijuana use, continuous  Hypertension  Diabetes    Surgical Hx:   S/P tubal ligation 20 years ago    MEDICATIONS  (STANDING):  ATENolol  Tablet 100 milliGRAM(s) Oral daily  atorvastatin 40 milliGRAM(s) Oral at bedtime  chlorhexidine 4% Liquid 1 Application(s) Topical daily  dextrose 5% + sodium chloride 0.45%. 1000 milliLiter(s) (40 mL/Hr) IV Continuous <Continuous>  heparin   Injectable 5000 Unit(s) SubCutaneous every 8 hours  insulin glargine Injectable (LANTUS) 30 Unit(s) SubCutaneous at bedtime  insulin regular  human corrective regimen sliding scale   SubCutaneous every 6 hours  lactobacillus acidophilus 1 Tablet(s) Oral daily  multivitamin/minerals 1 Tablet(s) Oral daily  pantoprazole    Tablet 40 milliGRAM(s) Oral daily  Parenteral Nutrition - Adult 1 Each (60 mL/Hr) TPN Continuous <Continuous>  piperacillin/tazobactam IVPB.. 3.375 Gram(s) IV Intermittent every 8 hours  saccharomyces boulardii 250 milliGRAM(s) Oral two times a day  tamsulosin 0.4 milliGRAM(s) Oral at bedtime    MEDICATIONS  (PRN):  oxyCODONE    IR 5 milliGRAM(s) Oral every 6 hours PRN Moderate Pain (4 - 6)    Allergies: No Known Allergies  Intolerances    SOCIAL HISTORY: No illicit drug use    Vital Signs Last 24 Hrs  T(C): 37 (21 Feb 2021 13:10), Max: 37 (21 Feb 2021 13:10)  T(F): 98.6 (21 Feb 2021 13:10), Max: 98.6 (21 Feb 2021 13:10)  HR: 56 (21 Feb 2021 13:10) (54 - 64)  BP: 156/80 (21 Feb 2021 13:10) (139/68 - 169/76)  RR: 18 (21 Feb 2021 13:10) (18 - 20)    Physical Exam:   Constitutional: NAD, well-developed, laying down, alert and aware   HEENT: EOMI  Neck: no pain  Back: No CVA tenderness  Respiratory: No accessory respiratory muscle use  Abd: Soft, NT/ND, Right AKANKSHA draining 25 mL of serous fluid, mid abdominal wound vac noted with no visible erythema or discharge, no organomegaly,  no palpable hernias  : 16F islver placed draining 135 mL of clear yellow urine upon exam, no signs of clots or sediments, no discharge, erythema, or hematuria noticed at the urethral meatus. Female nurse was present upon examination.   Extremities: no edema  Neurological: A/O x 3    Labs                        8.2    21.76 )-----------( 448      ( 20 Feb 2021 06:40 )             24.1     21 Feb 2021 04:54    140    |  106    |  21     ----------------------------<  108    4.3     |  22     |  0.8      Ca    8.0        21 Feb 2021 04:54  Phos  3.8       21 Feb 2021 04:54  Mg     2.1       21 Feb 2021 04:54    I&O's Detail    20 Feb 2021 07:01  -  21 Feb 2021 07:00  --------------------------------------------------------  IN:    Fat Emulsion (Fish Oil &amp; Plant Based) 20% Infusion: 186 mL    TPN (Total Parenteral Nutrition): 960 mL  Total IN: 1146 mL  OUT:    Bulb (mL): 105 mL    Intermittent Catheterization - Urethral (mL): 1000 mL  Total OUT: 1105 mL  Total NET: 41 mL    21 Feb 2021 07:01  -  21 Feb 2021 20:27  --------------------------------------------------------  IN:    TPN (Total Parenteral Nutrition): 345 mL  Total IN: 345 mL  OUT:    Bulb (mL): 30 mL    Intermittent Catheterization - Urethral (mL): 1000 mL  Total OUT: 1030 mL  Total NET: -685 mL  Urinalysis (02.17.21 @ 21:18)  Glucose Qualitative, Urine: Negative  Blood, Urine: Negative   pH Urine: 6.5   Color: Light Yellow   Urine Appearance: Clear   Bilirubin: Negative   Ketone - Urine: Negative   Specific Gravity: 1.012   Protein, Urine: Trace   Urobilinogen: <2 mg/dL  Nitrite: NegativeLeukocyte   Esterase Concentration: Negative   RADIOLOGY/IMAGING:   EXAM:  CT ABDOMEN AND PELVIS OC IC            PROCEDURE DATE:  02/17/2021      INTERPRETATION:  CLINICAL STATEMENT: Postop elevated white blood cell count. Evaluate for abdominal collection.    TECHNIQUE: Contiguous axial CT images were obtained from the lower chest to the pubic symphysis following administration of 100cc Optiray 320 intravenous contrast.  Oral contrast was administered.  Reformatted images in the coronal and sagittal planes were acquired.    COMPARISON CT: 2/10/2021    OTHER STUDIES USED FOR CORRELATION: Right upper quadrant sonogram from one day earlier.      FINDINGS:  LOWER CHEST: Subsegmental atelectasis at the lung bases bilaterally.  HEPATOBILIARY: Cholelithiasis. No focal liver lesions. Patent portal vein.  SPLEEN: Unremarkable.  PANCREAS: Unremarkable.  ADRENAL GLANDS: Unremarkable.  KIDNEYS: Symmetric bilateral renal enhancement. Bilateral renal cysts. No hydronephrosis bilaterally.  ABDOMINOPELVIC NODES: Unremarkable.  PELVIC ORGANS: Distended urinary bladder. A focus of gas within the urinary bladder antidependently is likely from instrumentation.  PERITONEUM/MESENTERY/BOWEL: Postsurgical pneumoperitoneum noted. Postsurgical changes from midline laparotomy noted with superficial drain in place in the midline. Status post bowel resection with suture material seen in the right abdomen on series 2 image 55. No intra-abdominal fluid collection. Mild hemorrhagic fluid/fat stranding seen in the right lower quadrant on series 601 image 22. No bowel obstruction. Oral contrast is seen to the level of the rectum.  BONES/SOFT TISSUES: Unremarkable.  IMPRESSION:  Postsurgical appearance of the abdomen status post bowel resection. No intra-abdominal fluid collection. Mild hemorrhagic fluid/fat stranding seen in the right lower quadrant.  Distended urinary bladder.    JESICA ARIAS MD; Attending Radiologist  This document has been electronically signed. Feb 17 2021  2:36PM  
  SICU Consultation Note  =====================================================  HPI:  HPI:  Patient is a 55 F with PMH HTN, HLD, DM 2, who presents with abdominal pain from a chronic umbilical hernia that she has had for 20 years since her tubal ligation surgery. Pain associated with nausea/vomiting for 3 days. CT abdomen/pelvis done in the ED which showed ventral hernia containing distal small bowel loops, the appendix, and the right colon from the cecum to the level of the transverse colon. There was proximal small bowel distension consistent with SBO, and reduced bowel wall enhancement consistent with ischemia. Lactate was 4.7. Patient was taken emergently to the OR where 160 cm of ischemic terminal ileum and ascending colon were resected, with primary anastamosis.  Fascia closed and wound vac placed. Per report, she was hypotensive intraop requiring pushes of neosynephrine. Post op she remains intubated.    Surgery Information  OR time:   2 hrs   EBL:     100     IV Fluids:    4L   Blood Products: none   UOP:    200 mL       PAST MEDICAL & SURGICAL HISTORY:  Marijuana use, continuous    Hypertension    Diabetes    No pertinent past medical history    S/P tubal ligation  20 years ago      Home Meds: Home Medications:  ATENOLOL  100 MG TABS:  (11 Feb 2021 02:18)  ATORVASTATIN 40MG TABLETS: TAKE 1 TABLET BY MOUTH EVERY NIGHT AT BEDTIME (11 Feb 2021 02:18)  predniSONE: orally once a day (11 Feb 2021 02:18)  Robaxin 500 mg oral tablet: orally 2 times a day (11 Feb 2021 02:18)    Allergies: Allergies    No Known Allergies    Intolerances      Soc:   Advanced Directives: Presumed Full Code     ROS:    REVIEW OF SYSTEMS    [ ] A ten-point review of systems was otherwise negative except as noted.  [x ] Due to altered mental status/intubation, subjective information were not able to be obtained from the patient. History was obtained, to the extent possible, from review of the chart and collateral sources of information.      CURRENT MEDICATIONS:   --------------------------------------------------------------------------------------  Neurologic Medications  dexMEDEtomidine Infusion 0.2 MICROgram(s)/kG/Hr IV Continuous <Continuous>  dexMEDEtomidine Infusion 0.2 MICROgram(s)/kG/Hr IV Continuous <Continuous>  fentaNYL    Injectable 25 MICROGram(s) IV Push once  fentaNYL    Injectable 25 MICROGram(s) IV Push once PRN Moderate Pain (4 - 6)    Respiratory Medications    Cardiovascular Medications    Gastrointestinal Medications  dextrose 5%. 1000 milliLiter(s) IV Continuous <Continuous>  dextrose 5%. 1000 milliLiter(s) IV Continuous <Continuous>  lactated ringers. 1000 milliLiter(s) IV Continuous <Continuous>  lactated ringers. 1000 milliLiter(s) IV Continuous <Continuous>  pantoprazole  Injectable 40 milliGRAM(s) IV Push daily  pantoprazole  Injectable 40 milliGRAM(s) IV Push daily    Genitourinary Medications    Hematologic/Oncologic Medications  heparin   Injectable 5000 Unit(s) SubCutaneous every 8 hours    Antimicrobial/Immunologic Medications  piperacillin/tazobactam IVPB... 3.375 Gram(s) IV Intermittent once    Endocrine/Metabolic Medications  dextrose 40% Gel 15 Gram(s) Oral once  dextrose 50% Injectable 25 Gram(s) IV Push once  dextrose 50% Injectable 12.5 Gram(s) IV Push once  dextrose 50% Injectable 25 Gram(s) IV Push once  glucagon  Injectable 1 milliGRAM(s) IntraMuscular once  insulin lispro (ADMELOG) corrective regimen sliding scale   SubCutaneous three times a day before meals    Topical/Other Medications    --------------------------------------------------------------------------------------    VITAL SIGNS, INS/OUTS (last 24 hours):  --------------------------------------------------------------------------------------  ICU Vital Signs Last 24 Hrs  T(C): 36.7 (10 Feb 2021 15:50), Max: 36.7 (10 Feb 2021 15:50)  T(F): 98 (10 Feb 2021 15:50), Max: 98 (10 Feb 2021 15:50)  HR: 109 (11 Feb 2021 03:18) (102 - 109)  BP: 115/85 (10 Feb 2021 23:29) (115/85 - 128/93)  BP(mean): --  ABP: --  ABP(mean): --  RR: 18 (10 Feb 2021 15:50) (18 - 18)  SpO2: 100% (11 Feb 2021 03:18) (97% - 100%)    I&O's Summary    --------------------------------------------------------------------------------------    EXAM:  NEURO: NAD, moving all extremities,  sedated on precedex    RESPIRATORY: Lungs clear to auscultation, Normal expansion/effort.    Mechanical Ventilation: Mode: AC/ CMV (Assist Control/ Continuous Mandatory Ventilation)  RR (machine): 22  TV (machine): 400  FiO2: 50  PEEP: 5  ITime: 1  MAP: 9  PIP: 22      CARDIOVASCULAR: Regular rate and rhythm. No peripheral edema.    GI: Abdomen soft, Non-distended.  Midline wound vac in place and holding suction. NGT to LCWS with feculent output    EXTREMITIES: Extremities warm, pink, well-perfused.      DERM: Good skin turgor, no skin breakdown.      :  Tavarez catheter in place. Urine light yellow    LABS  --------------------------------------------------------------------------------------  Labs:  CAPILLARY BLOOD GLUCOSE                              11.6   7.92  )-----------( 212      ( 11 Feb 2021 03:21 )             32.6       Auto Neutrophil %: 70.2 % (02-10-21 @ 17:56)  Band Neutrophils %: 7.9 % (02-10-21 @ 17:56)    02-10    137  |  92<L>  |  85<HH>  ----------------------------<  193<H>  4.0   |  16<L>  |  3.6<H>      Calcium, Total Serum: 8.8 mg/dL (02-10-21 @ 17:56)      LFTs:             6.5  | 1.1  | 21       ------------------[66      ( 10 Feb 2021 17:56 )  3.8  | x    | 20          Lipase:9      Amylase:x         Blood Gas Arterial, Lactate: 4.3 mmoL/L (02-11-21 @ 01:03)  Blood Gas Venous - Lactate: 4.3 mmoL/L (02-10-21 @ 18:59)  Lactate, Blood: 4.7 mmol/L (02-10-21 @ 17:56)    ABG - ( 11 Feb 2021 01:03 )  pH: 7.30  /  pCO2: 38    /  pO2: 172   / HCO3: 18    / Base Excess: -7.5  /  SaO2: 99                Coags:     13.30  ----< 1.16    ( 10 Feb 2021 22:50 )     27.1                      --------------------------------------------------------------------------------------    IMAGING RESULTS      < from: CT Abdomen and Pelvis w/ Oral Cont and w/ IV Cont (02.10.21 @ 21:05) >  There is a large ventral hernia containing distal small bowel loops, the appendix, and the right colon from the cecum to the level of the transverse colon. There is marked distention of proximal small bowel consistent with small bowel obstruction at the level of the hernia. There is reduced bowel wall enhancement of the distal small bowel proximal to the hernia which may indicate the presence of ischemia.    < end of copied text >  
HPI:  55y female with a past medical hx of HTN, Hyperlipidemia, DM, presents to the ED complaining of increased pain from a chronic umbilical hernia. She states that she first noticed this hernia about 20 years ago, immediately after a tubal ligation procedure, and it has been slowly enlarging since. She states that it always has been uncomfortable, but never this painful. The severe pain is rated 10/10 and is accompanied with nausea and vomiting, of 3 days duration. She states she is not passing gas and that her last bowel movement was 1 week ago.  (10 Feb 2021 22:33)       PAST MEDICAL & SURGICAL HISTORY:  Marijuana use, continuous    Hypertension    Diabetes    No pertinent past medical history    S/P tubal ligation  20 years ago        Hospital Course: pt dx w ischemic bowel due to ventral hernia post resection has a vacuum dressing and NGT tolerated the procedure well    TODAY'S SUBJECTIVE & REVIEW OF SYMPTOMS: no CP no SOB + abd pain due to sx see HPI other ROS unchanged       MEDICATIONS  (STANDING):  cefepime   IVPB 1000 milliGRAM(s) IV Intermittent every 12 hours  chlorhexidine 4% Liquid 1 Application(s) Topical daily  dextrose 40% Gel 15 Gram(s) Oral once  dextrose 5%. 1000 milliLiter(s) (50 mL/Hr) IV Continuous <Continuous>  dextrose 5%. 1000 milliLiter(s) (100 mL/Hr) IV Continuous <Continuous>  dextrose 50% Injectable 25 Gram(s) IV Push once  glucagon  Injectable 1 milliGRAM(s) IntraMuscular once  heparin   Injectable 5000 Unit(s) SubCutaneous every 8 hours  influenza   Vaccine 0.5 milliLiter(s) IntraMuscular once  insulin lispro (ADMELOG) corrective regimen sliding scale   SubCutaneous at bedtime  insulin lispro (ADMELOG) corrective regimen sliding scale   SubCutaneous three times a day before meals  lactated ringers. 1000 milliLiter(s) (125 mL/Hr) IV Continuous <Continuous>  metoprolol tartrate Injectable 2.5 milliGRAM(s) IV Push every 6 hours  metroNIDAZOLE  IVPB 500 milliGRAM(s) IV Intermittent every 8 hours  pantoprazole  Injectable 40 milliGRAM(s) IV Push daily  Parenteral Nutrition - Adult 1 Each (65 mL/Hr) TPN Continuous <Continuous>    MEDICATIONS  (PRN):      FAMILY HISTORY:      Allergies    No Known Allergies    Intolerances        SOCIAL HISTORY:    [  ] Etoh  [  ] Smoking  [x  ] Substance abuse     Home Environment:  [  ] Home Alone  [ x ] Lives with Family  [  ] Home Health Aid    Dwelling:  [  ] Apartment  [ x ] Private House  [  ] Adult Home  [  ] Skilled Nursing Facility      [  ] Short Term  [  ] Long Term  [  ] Stairs       Elevator [  ]    FUNCTIONAL STATUS PTA: (Check all that apply)  Ambulation: [ x  ]Independent   [   ] Requires Assistance   [  ] Dependent     [  ] Non-Ambulatory       Assistive Device: [  ] SA Cane  [  ]  Q Cane  [  ] Walker  [  ]  Wheelchair  ADL : [ x ] Independent    [   ] Requires Assistance    [  ]  Dependent       Vital Signs Last 24 Hrs  T(C): 36.9 (12 Feb 2021 15:54), Max: 37.8 (11 Feb 2021 20:00)  T(F): 98.4 (12 Feb 2021 15:54), Max: 100 (11 Feb 2021 20:00)  HR: 73 (12 Feb 2021 17:57) (73 - 97)  BP: 148/68 (12 Feb 2021 17:57) (106/80 - 148/68)  BP(mean): 86 (12 Feb 2021 14:00) (84 - 90)  RR: 16 (12 Feb 2021 15:54) (16 - 17)  SpO2: 99% (12 Feb 2021 14:00) (97% - 100%)      PHYSICAL EXAM: Alert & Oriented X3  GENERAL: NAD, well-groomed, well-developed  HEAD:  Atraumatic, Normocephalic, +NGT  EYES: EOMI, PERRL  NECK: Supple  CHEST/LUNG: Clear   HEART: Regular  ABDOMEN: wound vac in place  EXTREMITIES:  No clubbing, cyanosis, or edema  ROM:  [ x  ] WFL all extremities  [  ] Abnormal    NERVOUS SYSTEM:   Cranial Nerves 2-12: [ x  ] intact  [  ] Abnormal   Motor Strength: [  x ] WFL all extremities   [  ] Abnormal   FUNCTIONAL STATUS:  Bed Mobility: [   ]Independent  [x  ] Supervision  [  ] Needs Assistance   [  ] N/A   Transfers: [   ] Independent  [  ] Supervision  [x  ] Needs Assistance  [  ]  N/A   Ambulation: [   ] Independent  [  ] Supervision  [x  ] Needs Assistance  [  ] N/A   ADL: [   ] Independent  [ x ] Requires Assistance  [  ] N/A       LABS:                        8.8    14.53 )-----------( 165      ( 12 Feb 2021 11:00 )             24.5     02-12    141  |  108  |  68<HH>  ----------------------------<  124<H>  3.8   |  16<L>  |  2.2<H>    Ca    7.5<L>      12 Feb 2021 04:30  Phos  5.3     02-11  Mg     2.4     02-11    TPro  4.6<L>  /  Alb  2.8<L>  /  TBili  1.0  /  DBili  x   /  AST  17  /  ALT  15  /  AlkPhos  45  02-11    PT/INR - ( 11 Feb 2021 06:35 )   PT: 13.80 sec;   INR: 1.20 ratio         PTT - ( 11 Feb 2021 06:35 )  PTT:26.2 sec      
MELISSA CARUSO  55y, Female  Allergy: No Known Allergies      CHIEF COMPLAINT: Ventral Hernia with Ischemic Bowel (2021 12:47)      LOS  8d    HPI:  55y female with a past medical hx of HTN, Hyperlipidemia, DM, presents to the ED complaining of increased pain from a chronic umbilical hernia. She states that she first noticed this hernia about 20 years ago, immediately after a tubal ligation procedure, and it has been slowly enlarging since. She states that it always has been uncomfortable, but never this painful. The severe pain is rated 10/10 and is accompanied with nausea and vomiting, of 3 days duration. She states she is not passing gas and that her last bowel movement was 1 week ago.  (10 Feb 2021 22:33)      INFECTIOUS DISEASE HISTORY:  CT Abd/Pelvis 2/10 showing large ventral hernia with SBO at level of hernia, concern for ischemic bowel   S/p ex lap  found to have frankly necrotic small bowel, 164 cm resected with ischemic ascending colon and cecum, thrombosed mesentery, large complex ventral hernia, side to side anastomososis  Since , having worsening leukocytosis  Repeat CT Abd/Pelvis  with post surgical appearance s/p bowel resection without intra-abdominal fluid collection, mild hemorrhagic and fat stranding in RLQ   She is s/p IR guided cholecystostomy catheter placement     She denies any coughing, shortness of breath.   Reports having diarrhea.   Denies any nausea, vomiting.   Denies any dysuria, hematuria      PAST MEDICAL & SURGICAL HISTORY:  Marijuana use, continuous    Hypertension    Diabetes    No pertinent past medical history    S/P tubal ligation  20 years ago        FAMILY HISTORY  Family Hx rebiewed and non-contributory    SOCIAL HISTORY  Social History:  Denies any etoh use  Denies tobacco       ROS  General: Denies rigors, nightsweats  HEENT: Denies headache, rhinorrhea, sore throat, eye pain  CV: Denies CP, palpitations  PULM: Denies wheezing, hemoptysis  GI: Denies hematemesis, hematochezia, melena  : Denies discharge, hematuria  MSK: Denies arthralgias, myalgias  SKIN: Denies rash, lesions  NEURO: Denies paresthesias, weakness  PSYCH: Denies depression, anxiety    VITALS:  T(F): 98.5, Max: 98.5 (21 @ 14:19)  HR: 61  BP: 134/70  RR: 18Vital Signs Last 24 Hrs  T(C): 36.9 (2021 14:19), Max: 36.9 (2021 14:19)  T(F): 98.5 (2021 14:19), Max: 98.5 (2021 14:19)  HR: 61 (2021 14:19) (55 - 61)  BP: 134/70 (2021 14:19) (134/70 - 181/84)  BP(mean): --  RR: 18 (2021 14:19) (18 - 18)  SpO2: --    PHYSICAL EXAM:  Gen: NAD, resting in bed  HEENT: Normocephalic, atraumatic  Neck: supple, no lymphadenopathy  CV: Regular rate & regular rhythm  Lungs: decreased BS at bases, no fremitus  Abdomen: wound vac - C tube in place   Ext: Warm, well perfused  Neuro: non focal, awake  Skin: no rash, no erythema  Lines: no phlebitis    TESTS & MEASUREMENTS:                        9.7    29.28 )-----------( 410      ( 2021 04:15 )             28.5         141  |  104  |  25<H>  ----------------------------<  77  4.3   |  26  |  0.8    Ca    8.6      2021 04:15  Phos  3.9       Mg     1.5         TPro  5.1<L>  /  Alb  2.9<L>  /  TBili  0.5  /  DBili  0.2  /  AST  31  /  ALT  35  /  AlkPhos  138<H>      eGFR if Non African American: 82 mL/min/1.73M2 (21 @ 04:15)  eGFR if African American: 96 mL/min/1.73M2 (21 @ 04:15)    LIVER FUNCTIONS - ( 2021 06:48 )  Alb: 2.9 g/dL / Pro: 5.1 g/dL / ALK PHOS: 138 U/L / ALT: 35 U/L / AST: 31 U/L / GGT: x           Urinalysis Basic - ( 2021 21:18 )    Color: Light Yellow / Appearance: Clear / S.012 / pH: x  Gluc: x / Ketone: Negative  / Bili: Negative / Urobili: <2 mg/dL   Blood: x / Protein: Trace / Nitrite: Negative   Leuk Esterase: Negative / RBC: x / WBC x   Sq Epi: x / Non Sq Epi: x / Bacteria: x        Culture - Blood (collected 21 @ 17:03)  Source: .Blood None  Preliminary Report (21 @ 03:01):    No growth to date.    Culture - Blood (collected 02-10-21 @ 19:45)  Source: .Blood Blood-Peripheral  Final Report (21 @ 01:01):    No Growth Final            INFECTIOUS DISEASES TESTING  Procalcitonin, Serum: 1.22 ng/mL (21 @ 17:03)  COVID-19 PCR: NotDetec (02-10-21 @ 19:45)      RADIOLOGY & ADDITIONAL TESTS:  I have personally reviewed the last Chest xray  CXR  Xray Chest 1 View- PORTABLE-Urgent:   EXAM:  XR CHEST PORTABLE URGENT 1V            PROCEDURE DATE:  2021            INTERPRETATION:  Clinical History/Reason for Exam:  cough    Comparison: XR CHEST 2021.      Findings:    Technique/Positioning:  Frontal portable radiograph of the chest.    Support devices:  Left PICC line, satisfactory position    Cardiac/mediastinum/hilum: Stable    Lung parenchyma/ Pleura: Right greater than left basilar reticular opacities suggesting discoid atelectasis. Low lung volumes. No pneumothorax      Skeleton/soft tissues: No focal skeletal lesions are identified.      Impression:    Right greater than left basilar reticular opacities suggesting discoid atelectasis. Low lung volumes. No pneumothorax                      CLAUDIA MONROE MD; Attending Radiologist  This document has been electronically signed. 2021 12:13PM (21 @ 21:59)      CT  CT Abdomen and Pelvis w/ Oral Cont and w/ IV Cont:   EXAM:  CT ABDOMEN AND PELVIS OC IC            PROCEDURE DATE:  2021            INTERPRETATION:  CLINICAL STATEMENT: Postop elevated white blood cell count. Evaluate for abdominal collection.    TECHNIQUE: Contiguous axial CT images were obtained from the lower chest to the pubic symphysis following administration of 100cc Optiray 320 intravenous contrast.  Oral contrast was administered.  Reformatted images in the coronal and sagittal planes were acquired.    COMPARISON CT: 2/10/2021    OTHER STUDIES USED FOR CORRELATION: Right upper quadrant sonogram from one day earlier.      FINDINGS:    LOWER CHEST: Subsegmental atelectasis at the lung bases bilaterally.    HEPATOBILIARY: Cholelithiasis. No focal liver lesions. Patent portal vein.    SPLEEN: Unremarkable.    PANCREAS: Unremarkable.    ADRENAL GLANDS: Unremarkable.    KIDNEYS: Symmetric bilateral renal enhancement. Bilateral renal cysts. No hydronephrosis bilaterally.    ABDOMINOPELVIC NODES: Unremarkable.    PELVIC ORGANS: Distended urinary bladder. A focus of gas within the urinary bladder antidependently is likely from instrumentation.    PERITONEUM/MESENTERY/BOWEL: Postsurgical pneumoperitoneum noted. Postsurgical changes from midline laparotomy noted with superficial drain in place in the midline. Status post bowel resection with suture material seen in the right abdomen on series 2 image 55. No intra-abdominal fluid collection. Mild hemorrhagic fluid/fat stranding seen in the right lower quadrant on series 601 image 22. No bowel obstruction. Oral contrast is seen to the level of the rectum.    BONES/SOFT TISSUES: Unremarkable.      IMPRESSION:    Postsurgical appearance of the abdomen status post bowel resection. No intra-abdominal fluid collection. Mild hemorrhagic fluid/fat stranding seen in the right lower quadrant.    Distended urinary bladder.              JESICA ARIAS MD; Attending Radiologist  This document has been electronically signed. 2021  2:36PM (21 @ 13:42)      CARDIOLOGY TESTING  12 Lead ECG:   Ventricular Rate 81 BPM    Atrial Rate 81 BPM    P-R Interval 126 ms    QRS Duration 96 ms    Q-T Interval 406 ms    QTC Calculation(Bazett) 471 ms    P Axis 42 degrees    R Axis -12 degrees    T Axis 36 degrees    Diagnosis Line Normal sinus rhythm  Moderate voltage criteria for LVH, may be normal variant  Borderline ECG    Confirmed by MOMO DAN MD ([id number]) on 2021 6:06:04 AM (21 @ 22:11)  12 Lead ECG:   Ventricular Rate 88 BPM    Atrial Rate 88 BPM    P-R Interval 120 ms    QRS Duration 96 ms    Q-T Interval 376 ms    QTC Calculation(Bazett) 454 ms    P Axis 54 degrees    R Axis 30 degrees    T Axis 64 degrees    Diagnosis Line *** Poor data quality, interpretation may be adversely affected  Sinus rhythm with occasional Premature ventricular complexes  Otherwise normal ECG    Confirmed by Karthik Mcnamara ([id number]) on 2021 3:55:17 PM (21 @ 14:04)      MEDICATIONS  acetaminophen   Tablet .. 650 Oral every 6 hours  ATENolol  Tablet 100 Oral daily  atorvastatin 40 Oral at bedtime  chlorhexidine 4% Liquid 1 Topical daily  fat emulsion (Fish Oil and Plant Based) 20% Infusion 1.48 IV Continuous <Continuous>  heparin   Injectable 5000 SubCutaneous every 8 hours  insulin glargine Injectable (LANTUS) 30 SubCutaneous at bedtime  insulin regular  human corrective regimen sliding scale  SubCutaneous every 6 hours  lactobacillus acidophilus 1 Oral daily  pantoprazole    Tablet 40 Oral daily  Parenteral Nutrition - Adult 1 TPN Continuous <Continuous>  Parenteral Nutrition - Adult 1 TPN Continuous <Continuous>  piperacillin/tazobactam IVPB.. 3.375 IV Intermittent every 8 hours  saccharomyces boulardii 250 Oral two times a day  tamsulosin 0.4 Oral at bedtime      Weight  Weight (kg): 77.7 (21 @ 01:27)    ANTIBIOTICS:  piperacillin/tazobactam IVPB.. 3.375 Gram(s) IV Intermittent every 8 hours      ALLERGIES:  No Known Allergies      
SICU Consultation Note  ======================================================================================================  MELISSA CARUSO  MRN-[id number]    55y Female with PMHx of HTN, HLD, DM 2, who presented with abdominal pain from a chronic umbilical hernia that she has had for 20 years since her tubal ligation surgery. Pain associated with nausea/vomiting for 3 days. CT abdomen/pelvis done in the ED which showed ventral hernia containing distal small bowel loops, the appendix, and the right colon from the cecum to the level of the transverse colon. There was proximal small bowel distension consistent with SBO, and reduced bowel wall enhancement consistent with ischemia. Lactate was 4.7. Patient was taken emergently to the OR where 160 cm of ischemic terminal ileum and ascending colon were resected, with primary anastomosis.  Fascia closed and wound vac placed. Per report, she was hypotensive intraop requiring pushes of neosynephrine. Patient remained intubated post operatively.     Postoperatively patient was tachycardic, bolused 500cc which subsequently resolved. Cardiac enzymes were positive but have since been downtrending. Patient passed SBT in the afternoon on POD:1 and was extubated with no complications. Patient's NGT remains to low continuous suction and has put out 1.3L/24hrs. While NPO patient will continue to receive IV fluids LR@125. Urine output has been 80-100cc/hour with resolving DERRICK (68/2.2). Patient will continue Cefepime and flagyl at this time. Patient's pain has been controlled with IV tylenol and has been doing well s/p extubation with no complications.     Pt was downgraded from the SICU on 2/12, started on TPN 2/12, and found to be hypokalemic to 2.8, hyperglycemic to >200, and hypophosphatemic to 0.7, c/f refeeding syndrome. SICU consulted for severe electrolyte derangements.    Procedure: exploratory laparotomy, resection of small bowel, right hemicolectomy, repair of strangulated ventral hernia, abdominal washout, and placement of wound vac  OR Stats  OR time:   2 hrs   EBL:     100     IV Fluids:    4L   Blood Products: none   UOP:    200 mL   Findings- Frankly necrotic small bowel, 165cm resected  Ischemic ascending colon and cecum, pale, and thrombosed mesentery with weakly palpable pulse  large, complex ventral hernia with multiple defects, small bowel was twisted about its mesentery and herniated  Small bowel ran multiple times, proximally dilated small bowel, erythematous  Side to side anti-peristaltic anastomosis, 80mm RUTH blue stapler load  Fascia closed and wound vac placed    PMH  Marijuana use, continuous  Hypertension  Diabetes  No pertinent past medical history  S/P tubal ligation 20 years ago    Home Medications:  ATENOLOL  100 MG TABS:  (11 Feb 2021 02:18)  ATORVASTATIN 40MG TABLETS: TAKE 1 TABLET BY MOUTH EVERY NIGHT AT BEDTIME (11 Feb 2021 02:18)  predniSONE: orally once a day (11 Feb 2021 02:18)  Robaxin 500 mg oral tablet: orally 2 times a day (11 Feb 2021 02:18)    Allergies  No Known Allergies    Current Medications:  cefepime   IVPB 1000 milliGRAM(s) IV Intermittent every 12 hours  chlorhexidine 4% Liquid 1 Application(s) Topical daily  cyanocobalamin Injectable 1000 MICROGram(s) IntraMuscular daily  glucagon  Injectable 1 milliGRAM(s) IntraMuscular once  heparin   Injectable 5000 Unit(s) SubCutaneous every 8 hours  influenza   Vaccine 0.5 milliLiter(s) IntraMuscular once  metoprolol tartrate Injectable 2.5 milliGRAM(s) IV Push every 6 hours  metroNIDAZOLE  IVPB 500 milliGRAM(s) IV Intermittent every 8 hours  morphine  - Injectable 2 milliGRAM(s) IV Push every 6 hours PRN Moderate Pain (4 - 6)  pantoprazole  Injectable 40 milliGRAM(s) IV Push daily  potassium chloride  20 mEq/100 mL IVPB 20 milliEquivalent(s) IV Intermittent every 1 hour  sodium chloride 0.9% 1000 milliLiter(s) (125 mL/Hr) IV Continuous <Continuous>  thiamine IVPB 500 milliGRAM(s) IV Intermittent every 8 hours    Advanced Directives: Presumed Full Code    VITAL SIGNS, INS/OUTS (Last 24hours):    ICU Vital Signs Last 24 Hrs  T(C): 36.4 (13 Feb 2021 21:14), Max: 37 (13 Feb 2021 04:57)  T(F): 97.5 (13 Feb 2021 21:14), Max: 98.6 (13 Feb 2021 04:57)  HR: 87 (13 Feb 2021 21:14) (69 - 87)  BP: 166/83 (13 Feb 2021 21:14) (142/68 - 166/83)  RR: 18 (13 Feb 2021 21:14) (16 - 18)    I&O's Summary  12 Feb 2021 07:01  -  13 Feb 2021 07:00  --------------------------------------------------------  IN: 2720 mL / OUT: 3705 mL / NET: -985 mL    13 Feb 2021 07:01  -  13 Feb 2021 21:30  --------------------------------------------------------  IN: 1650 mL / OUT: 1850 mL / NET: -200 mL    Height (cm): 157.5 (02-10-21)  Weight (kg): 67.6 (02-10-21)  BMI (kg/m2): 27.3 (02-10-21)  BSA (m2): 1.69 (02-10-21)    Physical Exam:   ---------------------------------------------------------------------------------------  GCS: 15     Exam: A&Ox3, no focal deficits    RESPIRATORY:  Normal expansion/effort    CARDIOVASCULAR:  S1/S2    GASTROINTESTINAL:  Abdomen soft, non-tender, non-distended, wound vac in place draining serosanguinous drainage, NGT in place    MUSCULOSKELETAL:  Extremities warm, pink, well-perfused    DERM:  No skin breakdown     Tubes/Lines/Drains   ----------------------------------------------------------------------------------------------------------  [x] Peripheral IV  [X] PICC:         	  [X] Primafit    LABS  --------------------------------------------------------------------------------------  POCT Blood Glucose.: 307 mg/dL (13 Feb 2021 16:33)  POCT Blood Glucose.: 256 mg/dL (13 Feb 2021 12:09)  POCT Blood Glucose.: 258 mg/dL (13 Feb 2021 07:42)  POCT Blood Glucose.: 133 mg/dL (12 Feb 2021 22:04)                        8.5    17.46 )-----------( 149      ( 13 Feb 2021 16:21 )             24.0      02-13    137  |  100  |  31<H>  ----------------------------<  295<H>  2.7<LL>   |  27  |  0.9    Calcium, Total Serum: 7.7 mg/dL (02-13-21 @ 18:15)    Blood Gas Arterial, Lactate: 1.0 mmoL/L (02-11-21 @ 19:32)  Blood Gas Arterial, Lactate: 1.0 mmoL/L (02-11-21 @ 17:25)  Blood Gas Arterial, Lactate: 1.1 mmoL/L (02-11-21 @ 15:06)  Blood Gas Arterial, Lactate: 1.7 mmoL/L (02-11-21 @ 04:18)  Blood Gas Arterial, Lactate: 4.3 mmoL/L (02-11-21 @ 01:03)    ABG - ( 11 Feb 2021 19:32 )  pH: 7.42  /  pCO2: 26    /  pO2: 71    / HCO3: 17    / Base Excess: -6.8  /  SaO2: 95        ABG - ( 11 Feb 2021 17:25 )  pH: 7.44  /  pCO2: 25    /  pO2: 140   / HCO3: 17    / Base Excess: -6.2  /  SaO2: 98        ABG - ( 11 Feb 2021 15:06 )  pH: 7.41  /  pCO2: 28    /  pO2: 147   / HCO3: 18    / Base Excess: -5.7  /  SaO2: 99        Coags:     12.60  ----< 1.10    ( 12 Feb 2021 23:30 )     33.8      CARDIAC MARKERS ( 11 Feb 2021 23:30 )  x     / 0.08 ng/mL / x     / x     / x      --------------------------------------------------------------------------------------  Admit Diagnosis: INCARCERATED HERNIA;ACUTE RENAL FAILURE

## 2021-02-21 NOTE — CONSULT NOTE ADULT - CONSULT REQUESTED DATE/TIME
10-Feb-2021 18:04
11-Feb-2021 04:08
17-Feb-2021 16:04
21-Feb-2021 20:26
18-Feb-2021 15:31
12-Feb-2021 18:13
13-Feb-2021 21:30

## 2021-02-21 NOTE — CONSULT NOTE ADULT - ASSESSMENT
A) Urinary retention   P)  ·	Encourage ambulation  ·	TOV when pt ambulating 100 feet  ·	Obtain Renal Bladder ultrasound with PVR after TOV

## 2021-02-21 NOTE — CONSULT NOTE ADULT - ATTENDING COMMENTS
Agree with above
ischemic bowel   s/p exp lap and resection   septic shock post op   intubated and sedated   admit to SICU   continue resuscitation   BS antibiotics   pain control
55F w/ PMH of HTN, HLD, and DM 2 admitted for strangulated ventral hernia s/p small bowel resection (165cm) and right hemicolectomy with primary anastomosis. SICU consulted for management of severe electrolyte disturbances.       possible refeeding syndrome, hypophosphatemia, hypomagnesia, hypokalemia     dec tpn 10, aggressively replete electrolytes, allow some hyperglycemia, nutritionist aware

## 2021-02-22 ENCOUNTER — TRANSCRIPTION ENCOUNTER (OUTPATIENT)
Age: 56
End: 2021-02-22

## 2021-02-22 VITALS
HEART RATE: 54 BPM | RESPIRATION RATE: 18 BRPM | DIASTOLIC BLOOD PRESSURE: 70 MMHG | TEMPERATURE: 98 F | SYSTOLIC BLOOD PRESSURE: 150 MMHG

## 2021-02-22 LAB
ANION GAP SERPL CALC-SCNC: 11 MMOL/L — SIGNIFICANT CHANGE UP (ref 7–14)
BASOPHILS # BLD AUTO: 0.1 K/UL — SIGNIFICANT CHANGE UP (ref 0–0.2)
BASOPHILS NFR BLD AUTO: 0.6 % — SIGNIFICANT CHANGE UP (ref 0–1)
BUN SERPL-MCNC: 19 MG/DL — SIGNIFICANT CHANGE UP (ref 10–20)
CALCIUM SERPL-MCNC: 8.6 MG/DL — SIGNIFICANT CHANGE UP (ref 8.5–10.1)
CHLORIDE SERPL-SCNC: 105 MMOL/L — SIGNIFICANT CHANGE UP (ref 98–110)
CO2 SERPL-SCNC: 24 MMOL/L — SIGNIFICANT CHANGE UP (ref 17–32)
CREAT SERPL-MCNC: 0.9 MG/DL — SIGNIFICANT CHANGE UP (ref 0.7–1.5)
CULTURE RESULTS: SIGNIFICANT CHANGE UP
EOSINOPHIL # BLD AUTO: 0.41 K/UL — SIGNIFICANT CHANGE UP (ref 0–0.7)
EOSINOPHIL NFR BLD AUTO: 2.6 % — SIGNIFICANT CHANGE UP (ref 0–8)
GLUCOSE BLDC GLUCOMTR-MCNC: 113 MG/DL — HIGH (ref 70–99)
GLUCOSE BLDC GLUCOMTR-MCNC: 130 MG/DL — HIGH (ref 70–99)
GLUCOSE BLDC GLUCOMTR-MCNC: 167 MG/DL — HIGH (ref 70–99)
GLUCOSE SERPL-MCNC: 125 MG/DL — HIGH (ref 70–99)
HCT VFR BLD CALC: 25.5 % — LOW (ref 37–47)
HGB BLD-MCNC: 8.4 G/DL — LOW (ref 12–16)
IMM GRANULOCYTES NFR BLD AUTO: 2.7 % — HIGH (ref 0.1–0.3)
LYMPHOCYTES # BLD AUTO: 14.9 % — LOW (ref 20.5–51.1)
LYMPHOCYTES # BLD AUTO: 2.38 K/UL — SIGNIFICANT CHANGE UP (ref 1.2–3.4)
MAGNESIUM SERPL-MCNC: 1.7 MG/DL — LOW (ref 1.8–2.4)
MCHC RBC-ENTMCNC: 30.7 PG — SIGNIFICANT CHANGE UP (ref 27–31)
MCHC RBC-ENTMCNC: 32.9 G/DL — SIGNIFICANT CHANGE UP (ref 32–37)
MCV RBC AUTO: 93.1 FL — SIGNIFICANT CHANGE UP (ref 81–99)
MONOCYTES # BLD AUTO: 0.96 K/UL — HIGH (ref 0.1–0.6)
MONOCYTES NFR BLD AUTO: 6 % — SIGNIFICANT CHANGE UP (ref 1.7–9.3)
MRSA PCR RESULT.: NEGATIVE — SIGNIFICANT CHANGE UP
NEUTROPHILS # BLD AUTO: 11.74 K/UL — HIGH (ref 1.4–6.5)
NEUTROPHILS NFR BLD AUTO: 73.2 % — SIGNIFICANT CHANGE UP (ref 42.2–75.2)
NRBC # BLD: 0 /100 WBCS — SIGNIFICANT CHANGE UP (ref 0–0)
PHOSPHATE SERPL-MCNC: 4 MG/DL — SIGNIFICANT CHANGE UP (ref 2.1–4.9)
PLATELET # BLD AUTO: 442 K/UL — HIGH (ref 130–400)
POTASSIUM SERPL-MCNC: 4.4 MMOL/L — SIGNIFICANT CHANGE UP (ref 3.5–5)
POTASSIUM SERPL-SCNC: 4.4 MMOL/L — SIGNIFICANT CHANGE UP (ref 3.5–5)
RBC # BLD: 2.74 M/UL — LOW (ref 4.2–5.4)
RBC # FLD: 15.9 % — HIGH (ref 11.5–14.5)
SODIUM SERPL-SCNC: 140 MMOL/L — SIGNIFICANT CHANGE UP (ref 135–146)
SPECIMEN SOURCE: SIGNIFICANT CHANGE UP
WBC # BLD: 16.02 K/UL — HIGH (ref 4.8–10.8)
WBC # FLD AUTO: 16.02 K/UL — HIGH (ref 4.8–10.8)

## 2021-02-22 RX ORDER — MAGNESIUM SULFATE 500 MG/ML
2 VIAL (ML) INJECTION ONCE
Refills: 0 | Status: COMPLETED | OUTPATIENT
Start: 2021-02-22 | End: 2021-02-22

## 2021-02-22 RX ORDER — TAMSULOSIN HYDROCHLORIDE 0.4 MG/1
1 CAPSULE ORAL
Qty: 30 | Refills: 0
Start: 2021-02-22 | End: 2021-03-23

## 2021-02-22 RX ORDER — OXYCODONE HYDROCHLORIDE 5 MG/1
1 TABLET ORAL
Qty: 12 | Refills: 0
Start: 2021-02-22 | End: 2021-02-24

## 2021-02-22 RX ORDER — MULTIVIT-MIN/FERROUS GLUCONATE 9 MG/15 ML
1 LIQUID (ML) ORAL
Qty: 0 | Refills: 0 | DISCHARGE
Start: 2021-02-22

## 2021-02-22 RX ADMIN — Medication 1 TABLET(S): at 11:37

## 2021-02-22 RX ADMIN — Medication 250 MILLIGRAM(S): at 18:29

## 2021-02-22 RX ADMIN — PANTOPRAZOLE SODIUM 40 MILLIGRAM(S): 20 TABLET, DELAYED RELEASE ORAL at 11:37

## 2021-02-22 RX ADMIN — ATENOLOL 100 MILLIGRAM(S): 25 TABLET ORAL at 05:12

## 2021-02-22 RX ADMIN — HEPARIN SODIUM 5000 UNIT(S): 5000 INJECTION INTRAVENOUS; SUBCUTANEOUS at 14:02

## 2021-02-22 RX ADMIN — HEPARIN SODIUM 5000 UNIT(S): 5000 INJECTION INTRAVENOUS; SUBCUTANEOUS at 05:12

## 2021-02-22 RX ADMIN — PIPERACILLIN AND TAZOBACTAM 25 GRAM(S): 4; .5 INJECTION, POWDER, LYOPHILIZED, FOR SOLUTION INTRAVENOUS at 05:11

## 2021-02-22 RX ADMIN — Medication 12.5 GRAM(S): at 14:02

## 2021-02-22 RX ADMIN — Medication 250 MILLIGRAM(S): at 05:12

## 2021-02-22 RX ADMIN — PIPERACILLIN AND TAZOBACTAM 25 GRAM(S): 4; .5 INJECTION, POWDER, LYOPHILIZED, FOR SOLUTION INTRAVENOUS at 15:07

## 2021-02-22 NOTE — DISCHARGE NOTE PROVIDER - NSDCCPCAREPLAN_GEN_ALL_CORE_FT
PRINCIPAL DISCHARGE DIAGNOSIS  Diagnosis: Incarcerated hernia  Assessment and Plan of Treatment: s/p exploratomy laparotomy, bowel resection  -Diet: Continue low fiber diet as tolerated.  -Activity: Avoid heavy lifting or straining (anything over 10-15 pounds) for 6-8 weeks.  -Incision: Do not submerge wound in water. You may shower and wash the area gently with soap and water. Then place a dampened gauze followed by a dry gauze over the wound and cover with tape.  -Medications: You may continue your home medications. You may take OTC Tylenol every 6 hours as needed for pain. A prescription has been sent to your pharmacy for Oxycodone 5mg every 6 hous as needed for severe pain, please do not drive or operate heavy machinery which taking this medication as it will make you drowsy. A prescription has also been sent to your pharmacy for PO Augmentin every 12 hours for 5 days.  -Follow-up: Please call to schedule a follow-up appointment with Dr. Bolden within 1-2 weeks. Please also call to schedule a follow-up appointment with your primary care provider within 1-2 weeks.      SECONDARY DISCHARGE DIAGNOSES  Diagnosis: Cholelithiasis  Assessment and Plan of Treatment: s/p percutanous cholecystostomy by IR.  -Please flush drain daily.  -Monitor and record drain outputs.  -Please call to schedule a follow-up appointment with Dr. Landau within 2 weeks.    Diagnosis: Urinary retention  Assessment and Plan of Treatment: -You are being discharge home with the silver catheter in place.   -A prescription for Flomax 0.4mg daily has also been sent to your pharmacy due to urinary retention, please take as instructed.  -Please call to schedule a follow-up appointment with Dr. Ivey (urology) for further management within 1-2 weeks.    Diagnosis: Acute renal failure  Assessment and Plan of Treatment:

## 2021-02-22 NOTE — PROGRESS NOTE ADULT - SUBJECTIVE AND OBJECTIVE BOX
[Time Spent: ___ minutes] : I have spent [unfilled] minutes of face to face time with the patient GENERAL SURGERY PROGRESS NOTE     MELISSA CARUSO  55y  Female  Hospital day :12d  Procedure: Negative pressure wound therapy, greater than 50 sq cm    Open repair of strangulated ventral hernia    Abdominal washout    Right hemicolectomy    Resection of small bowel    Exploratory laparotomy    EVENTS IN THE LAST 24 HRS: Patient stable, tolerating diet, Off TPN, VAC working no leaks, Tavarez was removed during the weekend, had another episode of urinary retention, Tavarez was replaced, urology was consulted      T(F): 97.7 (02-22-21 @ 05:00), Max: 98.6 (02-21-21 @ 13:10)  HR: 63 (02-22-21 @ 05:00) (56 - 63)  BP: 155/72 (02-22-21 @ 05:00) (155/72 - 156/80)  RR: 18 (02-22-21 @ 05:00) (18 - 18)      PHYSICAL EXAM:  GENERAL: NAD,   CHEST/LUNG: Clear to auscultation bilaterally  HEART: Regular rate and rhythm  ABDOMEN: Soft, Nontender, Nondistended; VAC working no leaks  EXTREMITIES:  No clubbing, cyanosis, or edema      DIET/FLUIDS: multivitamin/minerals 1 Tablet(s) Oral daily    NG:                                                                                DRAINS:   02-21-21 @ 07:01  -  02-22-21 @ 07:00  --------------------------------------------------------  OUT: 42 mL        URINE:   02-21-21 @ 07:01  -  02-22-21 @ 07:00  --------------------------------------------------------  OUT: 1700 mL     Indwelling Urethral Catheter:     Connect To:  Straight Drainage/[place]    Indication:  Urinary Retention / Obstruction (02-21-21 @ 19:12)    GI proph:  pantoprazole    Tablet 40 milliGRAM(s) Oral daily    AC/ proph: heparin   Injectable 5000 Unit(s) SubCutaneous every 8 hours    ABx: piperacillin/tazobactam IVPB.. 3.375 Gram(s) IV Intermittent every 8 hours          LABS  Labs:  CAPILLARY BLOOD GLUCOSE      POCT Blood Glucose.: 113 mg/dL (22 Feb 2021 05:10)  POCT Blood Glucose.: 156 mg/dL (21 Feb 2021 23:50)  POCT Blood Glucose.: 167 mg/dL (21 Feb 2021 21:21)  POCT Blood Glucose.: 145 mg/dL (21 Feb 2021 15:01)  POCT Blood Glucose.: 153 mg/dL (21 Feb 2021 12:05)                          8.5    18.14 )-----------( 478      ( 21 Feb 2021 20:58 )             26.5       Auto Neutrophil %: 70.9 % (02-21-21 @ 20:58)  Auto Immature Granulocyte %: 3.6 % (02-21-21 @ 20:58)    02-21    140  |  106  |  21<H>  ----------------------------<  108<H>  4.3   |  22  |  0.8          LFTs:             6.1  | 0.5  | 16       ------------------[104     ( 21 Feb 2021 20:58 )  3.4  | 0.2  | 16          Lipase:x      Amylase:x

## 2021-02-22 NOTE — DISCHARGE NOTE NURSING/CASE MANAGEMENT/SOCIAL WORK - PATIENT PORTAL LINK FT
You can access the FollowMyHealth Patient Portal offered by [facility] by registering at the following website: [email or web address]. By joining [facility]’s FollowMyHealth portal, you will also be able to view your health information using other applications (apps) compatible with our system.

## 2021-02-22 NOTE — DISCHARGE NOTE PROVIDER - NSDCMRMEDTOKEN_GEN_ALL_CORE_FT
amoxicillin-clavulanate 875 mg-125 mg oral tablet: 1 tab(s) orally every 12 hours   ATENOLOL  100 MG TABS:   ATORVASTATIN 40MG TABLETS: TAKE 1 TABLET BY MOUTH EVERY NIGHT AT BEDTIME  Multiple Vitamins with Minerals oral tablet: 1 tab(s) orally once a day  oxyCODONE 5 mg oral tablet: 1 tab(s) orally every 6 hours, As needed, Moderate Pain (4 - 6) MDD:4  predniSONE: orally once a day  Robaxin 500 mg oral tablet: orally 2 times a day  tamsulosin 0.4 mg oral capsule: 1 cap(s) orally once a day (at bedtime)

## 2021-02-22 NOTE — DISCHARGE NOTE PROVIDER - CARE PROVIDER_API CALL
Melonie Bolden T  SURGERY  [address] [place] [address]  Phone: [phone]  Fax: [phone]  Follow Up Time: 1 week    Landau, Elliot (MD)  Radiology  [address] [place] [address]  Phone: [phone]  Fax: [phone]  Follow Up Time: 2 weeks    Raz Ivey)  Urology  [address] [place] [id number]  Phone: [phone]  Fax: [phone]  Follow Up Time: 1 week

## 2021-02-22 NOTE — DISCHARGE NOTE PROVIDER - PROVIDER TOKENS
PROVIDER:[TOKEN:[[id number]],FOLLOWUP:[1 week]],PROVIDER:[TOKEN:[[id number]],FOLLOWUP:[2 weeks]],PROVIDER:[TOKEN:[[id number]],FOLLOWUP:[1 week]]

## 2021-02-22 NOTE — PROGRESS NOTE ADULT - PROVIDER SPECIALTY LIST ADULT
Infectious Disease
Intervent Radiology
SICU
SICU
Surgery
Surgery
Intervent Radiology
Surgery
Intervent Radiology
Intervent Radiology
SICU
SICU
Surgery

## 2021-02-22 NOTE — PROGRESS NOTE ADULT - ASSESSMENT
55 years old female admitted due to incarcerated umbilical hernia, POD#11, S/P Exploratory laparotomy, small bowel resection, right hemicolectomy. Currently [atient stable, tolerating diet, Off TPN, VAC working no leaks, Tavarez was removed during the weekend, had another episode of urinary retention, Tavarez was replaced, urology was consulted, WBC improving      Plan    - Continue with diet  - Monitor vitals signs  - WOUND VAC CHANGE 2/22  - C/w Abx  - C/w PO medications   - Pain control as needed  - GI and DVT prophylaxis  - Encourage ambulation and IS   - Continue current management

## 2021-02-22 NOTE — PROGRESS NOTE ADULT - REASON FOR ADMISSION
Ventral Hernia with Ischemic Bowel
Ventral Hernia with Ischemic Bowel. s/p bowel resection and hemicolectomy
Ventral Hernia with Ischemic Bowel

## 2021-02-22 NOTE — DISCHARGE NOTE PROVIDER - HOSPITAL COURSE
This is a 55 year old female with a PMH/PSH of HTN, HLD, DM, and tubal ligation who presented to the ED on 2/10 due to increased pain from a chronic umbilical hernia associated with nausea and vomiting. Imaging was significant for a large ventral hernia containing distal small bowel loops, the appendix, and the right colon from the cecum to the level of the transverse colon. Additionally, there was reduced bowel wall enhancement of the distal small bowel proximal to the hernia potentially indicating ischemia. An NG tube was placed and the decision was made to take the patient emergently to the OR. Patient was found to have frankly necrotic small bowel with 165cm resected. A side to side anastomosis with performed, the fascia was closed, and wound vac placed with plans to change M/W/F. Patient remained intubated and was taken to the SICU for monitoring post-operatively.     2/11:  -Patient extubated, vitals stable on room air. Troponins mildly elevated likely secondary to demand.     2/12:  -Hemoglobin downtrending but stable.  -Cleared for downgrade to the floor.  -Silver discontinued.  -PICC line placed, starting TPN.  -Physiatry evaluated the patient, recommending discharge home with services.    2/13:  -Right IJ central line removed.  -Physical therapy evaluated the patient, recommending home with services vs rehab center.   -Patient noted to have severe electrolyte derangements, was re-upgraded to the SICU for refeeding syndrome. Repletions completed, TPN adjusted in the setting of these derangements.    2/14:  -Repletions continued.    2/15:  -Insulin drip discontinued, home lantus restarted.  -NG tube removed.    2/16:  -Patient downgraded from SICU to surgical floor.  -WBC increasing -- continue antibiotics, lower extremity duplex ordered as well as a RUQ ultrasound, urinalysis and blood cultures.     2/17:  -RUQ significant for cholelithiasis without signs of cholecystitis.  -IR consulted for perc zcak - recommending HIDA scan and silver placement.    2/18:  -Patient underwent percutaneous cholecystostomy catheter placement with 150cc of black, tar-like bile drained.  -ID consulted, recommended continuing Zosyn, repeat blood culture if WBC worsens.  -Silver placed for urinary retention, patient started on flomax.    2/19:  -Vitally stable, pain controlled.    2/20:  -Silver removed.    2/21:  -TPN weaned off.  -Silver replaced for urinary retention.  -Urology consulted, will discharge home with silver since patient unable to ambulate well.    2/22:  -Wound vac removed, wet to dry dressing in place.   -Patient stable for discharge. To be discharged with silver in place. Home care arranged from dressing, drain teaching procided. A rolling walker is to be delivered bedside.  -Per ID, patient may be discharge on Augmentin x 5 days.

## 2021-02-22 NOTE — CHART NOTE - NSCHARTNOTESELECT_GEN_ALL_CORE
SICU/Transfer Note
Nutrition Support/Nutrition Services
SICU Downgrade/Transfer Note

## 2021-02-22 NOTE — CHART NOTE - NSCHARTNOTEFT_GEN_A_CORE
T(F): 97.7 (02-22-21 @ 05:00), Max: 98.6 (02-21-21 @ 13:10)  HR: 63 (02-22-21 @ 05:00) (56 - 63)  BP: 155/72 (02-22-21 @ 05:00) (155/72 - 156/80)  RR: 18 (02-22-21 @ 05:00) (18 - 18)  SpO2: --    I&O's Detail    21 Feb 2021 07:01  -  22 Feb 2021 07:00  --------------------------------------------------------  IN:    dextrose 5% + sodium chloride 0.45%: 200 mL    IV PiggyBack: 175 mL    TPN (Total Parenteral Nutrition): 345 mL  Total IN: 720 mL    OUT:    Bulb (mL): 42 mL    Indwelling Catheter - Urethral (mL): 700 mL    Intermittent Catheterization - Urethral (mL): 1000 mL  Total OUT: 1742 mL    Total NET: -1022 mL    02-21    140  |  106  |  21<H>  ----------------------------<  108<H>  4.3   |  22  |  0.8    Ca    8.0<L>      21 Feb 2021 04:54  Phos  3.8     02-21  Mg     2.1     02-21    TPro  6.1  /  Alb  3.4<L>  /  TBili  0.5  /  DBili  0.2  /  AST  16  /  ALT  16  /  AlkPhos  104  02-21                       8.4    16.02 )-----------( 442      ( 22 Feb 2021 07:20 )             25.5     CAPILLARY BLOOD GLUCOSE  POCT Blood Glucose.: 113 mg/dL (22 Feb 2021 05:10)  POCT Blood Glucose.: 156 mg/dL (21 Feb 2021 23:50)  POCT Blood Glucose.: 167 mg/dL (21 Feb 2021 21:21)  POCT Blood Glucose.: 145 mg/dL (21 Feb 2021 15:01)  POCT Blood Glucose.: 153 mg/dL (21 Feb 2021 12:05)    Diet, Low Fiber:   DASH/TLC {Sodium & Cholesterol Restricted}  Low Fat (LOWFAT) (02-20-21 @ 10:41)    ASSESSMENT  55 F with PMH HTN, HLD, DM 2, with strangulated ventral hernia s/p 165 cm SBR/Right hemicolectomy with primary anastamosis (2/11)    PLAN  - TPN off  - glycemic control No complaints, doing well  Tolerating PO diet with improved intake  Reports less diarrhea  Lytes stable  ? d/c home today    T(F): 97.7 (02-22-21 @ 05:00), Max: 98.6 (02-21-21 @ 13:10)  HR: 63 (02-22-21 @ 05:00) (56 - 63)  BP: 155/72 (02-22-21 @ 05:00) (155/72 - 156/80)  RR: 18 (02-22-21 @ 05:00) (18 - 18)    I&O's Detail    21 Feb 2021 07:01  -  22 Feb 2021 07:00  --------------------------------------------------------  IN:    dextrose 5% + sodium chloride 0.45%: 200 mL    IV PiggyBack: 175 mL    TPN (Total Parenteral Nutrition): 345 mL  Total IN: 720 mL    OUT:    Bulb (mL): 42 mL    Indwelling Catheter - Urethral (mL): 700 mL    Intermittent Catheterization - Urethral (mL): 1000 mL  Total OUT: 1742 mL    Total NET: -1022 mL    02-21    140  |  106  |  21<H>  ----------------------------<  108<H>  4.3   |  22  |  0.8    Ca    8.0<L>      21 Feb 2021 04:54  Phos  3.8     02-21  Mg     2.1     02-21    TPro  6.1  /  Alb  3.4<L>  /  TBili  0.5  /  DBili  0.2  /  AST  16  /  ALT  16  /  AlkPhos  104  02-21                       8.4    16.02 )-----------( 442      ( 22 Feb 2021 07:20 )             25.5     CAPILLARY BLOOD GLUCOSE  POCT Blood Glucose.: 113 mg/dL (22 Feb 2021 05:10)  POCT Blood Glucose.: 156 mg/dL (21 Feb 2021 23:50)  POCT Blood Glucose.: 167 mg/dL (21 Feb 2021 21:21)  POCT Blood Glucose.: 145 mg/dL (21 Feb 2021 15:01)  POCT Blood Glucose.: 153 mg/dL (21 Feb 2021 12:05)    Diet, Low Fiber:   DASH/TLC {Sodium & Cholesterol Restricted}  Low Fat (LOWFAT) (02-20-21 @ 10:41)    ASSESSMENT  55 F with PMH HTN, HLD, DM 2, with strangulated ventral hernia s/p 165 cm SBR/Right hemicolectomy with primary anastamosis (2/11)    PLAN  - PO diet as tolerated  - TPN off  - glycemic control No complaints, doing well - d/w residents and nurses daily over the weekend, PN and fluid orders entered  Tolerating PO diet with improved intake  Reports less diarrhea  Lytes stable  ? d/c home today    T(F): 97.7 (02-22-21 @ 05:00), Max: 98.6 (02-21-21 @ 13:10)  HR: 63 (02-22-21 @ 05:00) (56 - 63)  BP: 155/72 (02-22-21 @ 05:00) (155/72 - 156/80)  RR: 18 (02-22-21 @ 05:00) (18 - 18)    I&O's Detail    21 Feb 2021 07:01  -  22 Feb 2021 07:00  --------------------------------------------------------  IN:    dextrose 5% + sodium chloride 0.45%: 200 mL    IV PiggyBack: 175 mL    TPN (Total Parenteral Nutrition): 345 mL  Total IN: 720 mL    OUT:    Bulb (mL): 42 mL    Indwelling Catheter - Urethral (mL): 700 mL    Intermittent Catheterization - Urethral (mL): 1000 mL  Total OUT: 1742 mL    Total NET: -1022 mL    02-21    140  |  106  |  21<H>  ----------------------------<  108<H>  4.3   |  22  |  0.8    Ca    8.0<L>      21 Feb 2021 04:54  Phos  3.8     02-21  Mg     2.1     02-21    TPro  6.1  /  Alb  3.4<L>  /  TBili  0.5  /  DBili  0.2  /  AST  16  /  ALT  16  /  AlkPhos  104  02-21                       8.4    16.02 )-----------( 442      ( 22 Feb 2021 07:20 )             25.5     CAPILLARY BLOOD GLUCOSE  POCT Blood Glucose.: 113 mg/dL (22 Feb 2021 05:10)  POCT Blood Glucose.: 156 mg/dL (21 Feb 2021 23:50)  POCT Blood Glucose.: 167 mg/dL (21 Feb 2021 21:21)  POCT Blood Glucose.: 145 mg/dL (21 Feb 2021 15:01)  POCT Blood Glucose.: 153 mg/dL (21 Feb 2021 12:05)    Diet, Low Fiber:   DASH/TLC {Sodium & Cholesterol Restricted}  Low Fat (LOWFAT) (02-20-21 @ 10:41)    ASSESSMENT  55 F with PMH HTN, HLD, DM 2, with strangulated ventral hernia s/p 165 cm SBR/Right hemicolectomy with primary anastomosis (2/11)    PLAN  - PO diet as tolerated  - TPN off  - glycemic control

## 2021-02-23 LAB
CULTURE RESULTS: SIGNIFICANT CHANGE UP
SPECIMEN SOURCE: SIGNIFICANT CHANGE UP

## 2021-02-24 LAB
CULTURE RESULTS: SIGNIFICANT CHANGE UP
SPECIMEN SOURCE: SIGNIFICANT CHANGE UP

## 2021-03-04 DIAGNOSIS — A41.9 SEPSIS, UNSPECIFIED ORGANISM: ICD-10-CM

## 2021-03-04 DIAGNOSIS — R33.9 RETENTION OF URINE, UNSPECIFIED: ICD-10-CM

## 2021-03-04 DIAGNOSIS — N17.9 ACUTE KIDNEY FAILURE, UNSPECIFIED: ICD-10-CM

## 2021-03-04 DIAGNOSIS — I95.9 HYPOTENSION, UNSPECIFIED: ICD-10-CM

## 2021-03-04 DIAGNOSIS — F17.210 NICOTINE DEPENDENCE, CIGARETTES, UNCOMPLICATED: ICD-10-CM

## 2021-03-04 DIAGNOSIS — K43.7 OTHER AND UNSPECIFIED VENTRAL HERNIA WITH GANGRENE: ICD-10-CM

## 2021-03-04 DIAGNOSIS — Z79.52 LONG TERM (CURRENT) USE OF SYSTEMIC STEROIDS: ICD-10-CM

## 2021-03-04 DIAGNOSIS — E83.42 HYPOMAGNESEMIA: ICD-10-CM

## 2021-03-04 DIAGNOSIS — I10 ESSENTIAL (PRIMARY) HYPERTENSION: ICD-10-CM

## 2021-03-04 DIAGNOSIS — F12.90 CANNABIS USE, UNSPECIFIED, UNCOMPLICATED: ICD-10-CM

## 2021-03-04 DIAGNOSIS — E87.2 ACIDOSIS: ICD-10-CM

## 2021-03-04 DIAGNOSIS — E78.5 HYPERLIPIDEMIA, UNSPECIFIED: ICD-10-CM

## 2021-03-04 DIAGNOSIS — E87.8 OTHER DISORDERS OF ELECTROLYTE AND FLUID BALANCE, NOT ELSEWHERE CLASSIFIED: ICD-10-CM

## 2021-03-04 DIAGNOSIS — E11.65 TYPE 2 DIABETES MELLITUS WITH HYPERGLYCEMIA: ICD-10-CM

## 2021-03-04 DIAGNOSIS — R65.21 SEVERE SEPSIS WITH SEPTIC SHOCK: ICD-10-CM

## 2021-03-04 DIAGNOSIS — I24.8 OTHER FORMS OF ACUTE ISCHEMIC HEART DISEASE: ICD-10-CM

## 2021-03-04 DIAGNOSIS — E87.6 HYPOKALEMIA: ICD-10-CM

## 2021-03-04 DIAGNOSIS — E83.39 OTHER DISORDERS OF PHOSPHORUS METABOLISM: ICD-10-CM

## 2021-03-04 DIAGNOSIS — E66.9 OBESITY, UNSPECIFIED: ICD-10-CM

## 2021-03-04 DIAGNOSIS — K80.00 CALCULUS OF GALLBLADDER WITH ACUTE CHOLECYSTITIS WITHOUT OBSTRUCTION: ICD-10-CM

## 2021-03-04 DIAGNOSIS — R10.9 UNSPECIFIED ABDOMINAL PAIN: ICD-10-CM

## 2021-03-09 ENCOUNTER — APPOINTMENT (OUTPATIENT)
Dept: ENDOCRINOLOGY | Facility: CLINIC | Age: 56
End: 2021-03-09

## 2021-03-12 PROBLEM — I10 ESSENTIAL (PRIMARY) HYPERTENSION: Chronic | Status: ACTIVE | Noted: 2021-02-11

## 2021-03-12 PROBLEM — E11.9 TYPE 2 DIABETES MELLITUS WITHOUT COMPLICATIONS: Chronic | Status: ACTIVE | Noted: 2021-02-11

## 2021-03-17 ENCOUNTER — OUTPATIENT (OUTPATIENT)
Dept: OUTPATIENT SERVICES | Facility: HOSPITAL | Age: 56
LOS: 1 days | Discharge: HOME | End: 2021-03-17

## 2021-03-17 VITALS
DIASTOLIC BLOOD PRESSURE: 74 MMHG | HEART RATE: 67 BPM | SYSTOLIC BLOOD PRESSURE: 122 MMHG | RESPIRATION RATE: 16 BRPM | WEIGHT: 123.9 LBS | HEIGHT: 62 IN | OXYGEN SATURATION: 100 % | TEMPERATURE: 97 F

## 2021-03-17 DIAGNOSIS — Z01.818 ENCOUNTER FOR OTHER PREPROCEDURAL EXAMINATION: ICD-10-CM

## 2021-03-17 DIAGNOSIS — J38.1 POLYP OF VOCAL CORD AND LARYNX: Chronic | ICD-10-CM

## 2021-03-17 DIAGNOSIS — Z98.51 TUBAL LIGATION STATUS: Chronic | ICD-10-CM

## 2021-03-17 DIAGNOSIS — Z90.49 ACQUIRED ABSENCE OF OTHER SPECIFIED PARTS OF DIGESTIVE TRACT: Chronic | ICD-10-CM

## 2021-03-17 DIAGNOSIS — K80.20 CALCULUS OF GALLBLADDER WITHOUT CHOLECYSTITIS WITHOUT OBSTRUCTION: ICD-10-CM

## 2021-03-17 LAB
A1C WITH ESTIMATED AVERAGE GLUCOSE RESULT: 6 % — HIGH (ref 4–5.6)
ALBUMIN SERPL ELPH-MCNC: 4 G/DL — SIGNIFICANT CHANGE UP (ref 3.5–5.2)
ALP SERPL-CCNC: 84 U/L — SIGNIFICANT CHANGE UP (ref 30–115)
ALT FLD-CCNC: 17 U/L — SIGNIFICANT CHANGE UP (ref 0–41)
ANION GAP SERPL CALC-SCNC: 15 MMOL/L — HIGH (ref 7–14)
APTT BLD: 33.5 SEC — SIGNIFICANT CHANGE UP (ref 27–39.2)
AST SERPL-CCNC: 13 U/L — SIGNIFICANT CHANGE UP (ref 0–41)
BASOPHILS # BLD AUTO: 0.07 K/UL — SIGNIFICANT CHANGE UP (ref 0–0.2)
BASOPHILS NFR BLD AUTO: 0.7 % — SIGNIFICANT CHANGE UP (ref 0–1)
BILIRUB SERPL-MCNC: 0.2 MG/DL — SIGNIFICANT CHANGE UP (ref 0.2–1.2)
BUN SERPL-MCNC: 57 MG/DL — HIGH (ref 10–20)
CALCIUM SERPL-MCNC: 9.3 MG/DL — SIGNIFICANT CHANGE UP (ref 8.5–10.1)
CHLORIDE SERPL-SCNC: 104 MMOL/L — SIGNIFICANT CHANGE UP (ref 98–110)
CO2 SERPL-SCNC: 19 MMOL/L — SIGNIFICANT CHANGE UP (ref 17–32)
CREAT SERPL-MCNC: 1.8 MG/DL — HIGH (ref 0.7–1.5)
EOSINOPHIL # BLD AUTO: 0.19 K/UL — SIGNIFICANT CHANGE UP (ref 0–0.7)
EOSINOPHIL NFR BLD AUTO: 1.8 % — SIGNIFICANT CHANGE UP (ref 0–8)
ESTIMATED AVERAGE GLUCOSE: 126 MG/DL — HIGH (ref 68–114)
GLUCOSE SERPL-MCNC: 178 MG/DL — HIGH (ref 70–99)
HCT VFR BLD CALC: 32.6 % — LOW (ref 37–47)
HGB BLD-MCNC: 10.6 G/DL — LOW (ref 12–16)
IMM GRANULOCYTES NFR BLD AUTO: 0.8 % — HIGH (ref 0.1–0.3)
INR BLD: 0.97 RATIO — SIGNIFICANT CHANGE UP (ref 0.65–1.3)
LYMPHOCYTES # BLD AUTO: 2.88 K/UL — SIGNIFICANT CHANGE UP (ref 1.2–3.4)
LYMPHOCYTES # BLD AUTO: 27.2 % — SIGNIFICANT CHANGE UP (ref 20.5–51.1)
MCHC RBC-ENTMCNC: 28.9 PG — SIGNIFICANT CHANGE UP (ref 27–31)
MCHC RBC-ENTMCNC: 32.5 G/DL — SIGNIFICANT CHANGE UP (ref 32–37)
MCV RBC AUTO: 88.8 FL — SIGNIFICANT CHANGE UP (ref 81–99)
MONOCYTES # BLD AUTO: 0.59 K/UL — SIGNIFICANT CHANGE UP (ref 0.1–0.6)
MONOCYTES NFR BLD AUTO: 5.6 % — SIGNIFICANT CHANGE UP (ref 1.7–9.3)
NEUTROPHILS # BLD AUTO: 6.76 K/UL — HIGH (ref 1.4–6.5)
NEUTROPHILS NFR BLD AUTO: 63.9 % — SIGNIFICANT CHANGE UP (ref 42.2–75.2)
NRBC # BLD: 0 /100 WBCS — SIGNIFICANT CHANGE UP (ref 0–0)
PLATELET # BLD AUTO: 406 K/UL — HIGH (ref 130–400)
POTASSIUM SERPL-MCNC: 3.5 MMOL/L — SIGNIFICANT CHANGE UP (ref 3.5–5)
POTASSIUM SERPL-SCNC: 3.5 MMOL/L — SIGNIFICANT CHANGE UP (ref 3.5–5)
PROT SERPL-MCNC: 6.8 G/DL — SIGNIFICANT CHANGE UP (ref 6–8)
PROTHROM AB SERPL-ACNC: 11.2 SEC — SIGNIFICANT CHANGE UP (ref 9.95–12.87)
RBC # BLD: 3.67 M/UL — LOW (ref 4.2–5.4)
RBC # FLD: 14.4 % — SIGNIFICANT CHANGE UP (ref 11.5–14.5)
SODIUM SERPL-SCNC: 138 MMOL/L — SIGNIFICANT CHANGE UP (ref 135–146)
WBC # BLD: 10.57 K/UL — SIGNIFICANT CHANGE UP (ref 4.8–10.8)
WBC # FLD AUTO: 10.57 K/UL — SIGNIFICANT CHANGE UP (ref 4.8–10.8)

## 2021-03-17 RX ORDER — ATENOLOL 25 MG/1
0 TABLET ORAL
Qty: 0 | Refills: 0 | DISCHARGE

## 2021-03-17 RX ORDER — METHOCARBAMOL 500 MG/1
0 TABLET, FILM COATED ORAL
Qty: 0 | Refills: 0 | DISCHARGE

## 2021-03-17 NOTE — H&P PST ADULT - VENOUS THROMBOEMBOLISM BMI
Elton presents today with   Chief Complaint   Patient presents with   • Diabetes     6 month follow up        HISTORY OF PRESENT ILLNESS:   Elton Rodriguez is a very pleasant 62 year old male for follow up of Type II Diabetes.  Patient has had diabetes for 10 years.  He has complications including retinopathy, peripheral neuropathy, cardiovascular disease and peripheral vascular disease.  He was last seen by BABAK Landeros and insulin adjustments were made at that time.  His last A1c was 7.2 on 03/26/2019; today's A1c was 7.3%.  Elton is alone for today's visit.    Elton is doing well with diabetes management.  He takes his glimepiride and metformin as prescribed.  He takes Novolin twice a day.  He was possibly \"doubling up\" on doses as he can not remember if he took his insulin.  He is now placing his insulin in an AM and PM cup; which seems to help.  He increased his PM dose to 30 units because he has difficulty seeing the line markings between 5 and 10 units.  He demonstrated to provider how he pulls up his insulin in a syringe.  He uses the bottom part of the plunger as a line of reference.  It is estimated he is giving himself about an extra 5 units with every administration because of this.  He often takes his insulin during or after his meals.  He is symptotic when his blood sugars are in the 70-80 range.  He consumes candy and/or juice in this range.        His significant PMH includes hyperlipidemia, hypertension, CKD stage 2, CAD w/stent, and arthritis    Elton's current diabetic medications prescribed are:  Glimepiride 4 mg twice daily  (takes at breakfast and at bedtime)  Novolin 70/30 30 units in AM; 30 units in PM (Increased PM dose by self)  Metformin 1000 mg 1 tab by mouth twice daily (takes with breakfast and at bedtime)    Compliant/Self-management barriers:  None    Eating Patterns:  Consumes two meals a day    Diabetes Intake Worksheet    How are you doing?  1.  How often are you checking your  sugars?:  4-6 times per week  2.  What times of day do you check your blood sugars?  Check all that apply.:  Fasting in the morning, Before bed  3.  What are your sugars most of the time?:  120-160  4.  Have you had any low sugar levels?  (less than 70 mg/dL):  Rarely  5.  How many days a week are you moderately or strenuously active--that you do activities that make you breathe harder or have a faster heart beat?:  4-6 times per week  6.  How often to you eat?:  2 times per day  7.  It's not uncommon for people to miss their medications from time to time.  How often do you miss taking your medicine?:  Some of the time  8.  Do you have any concerns about:  Eye problems, Sexual problems, Numbness or tingling, Urine problems  9.  Where did you have your last eye exam done and when?:    10.  PHQ2 score:   PHQ9 score:     __________________________________________________  The following educational material(s) were printed and given to the patient during this visit.  __________________________________________________  The following educational material(s) were given to the patient during this visit.  __________________________________________________  Instructions for the following ALBERTO module(s) were given to the patient during this visit.            Recent PHQ 2/9 Score    PHQ 2:  Date Adult PHQ 2 Score   10/25/2019 0       PHQ 9:        Meter log brought to appointment.  No dates on log  Fasting average:  172  Pre-supper average:  127  Lowest blood sugar on log was 80 prior to dinner    DIABETES HISTORY:   Elton has Type II Diabetes.  Onset time: 1995.  His disease course has been stable.  Previous hypoglycemic symptoms include lightheadedness, shakiness and sweatiness.   Diabetes complications include retinopathy, peripheral neuropathy, cardiovascular disease and peripheral vascular disease.  He does not  have hypoglycemia unawareness.      DIABETIC HEALTH MAINTENANCE:  The 10-year ASCVD risk score (Ana Maria MEEKS Jr., et  al., 2013) is: 27.5%    Values used to calculate the score:      Age: 62 years      Sex: Male      Is Non- : No      Diabetic: Yes      Tobacco smoker: No      Systolic Blood Pressure: 148 mmHg      Is BP treated: Yes      HDL Cholesterol: 40 mg/dL      Total Cholesterol: 155 mg/dL  Is patient on an ACE/ARB?:  Yes  Is patient on aspirin therapy?:  Yes  Is patient on statin/fibrate therapy?:  Yes  See nurses' notes for further health maintenance    DIABETES LABS:  Hemoglobin A1C (%)   Date Value   07/19/2018 9.7 (H)   04/20/2018 10.1 (H)   01/22/2018 10.1 (H)   07/24/2017 9.3 (H)     Hemoglobin A1C POC (%)   Date Value   03/26/2019 7.2 (A)   12/03/2018 10.5 (A)     Glucose Bedside (no units)   Date Value   03/26/2019 243   12/03/2018 252      CHOLESTEROL (mg/dL)   Date Value   04/12/2019 155   01/22/2018 154     HDL (mg/dL)   Date Value   04/12/2019 40   01/22/2018 40     CALCULATED LDL (mg/dL)   Date Value   04/12/2019 86   01/22/2018 75     CHOL/HDL (no units)   Date Value   04/12/2019 3.9   01/22/2018 3.9     TRIGLYCERIDE (mg/dL)   Date Value   04/12/2019 146   01/22/2018 195 (H)     MICROALBUMIN/CREATININE (mcg/mg)   Date Value   01/22/2018 506.8 (H)   01/06/2017 392.5 (H)     Lab Results   Component Value Date    SODIUM 138 04/12/2019    POTASSIUM 4.9 04/12/2019    CHLORIDE 101 04/12/2019    CO2 29 04/12/2019    GLUCOSE 155 (H) 04/12/2019    BUN 30 (H) 04/12/2019    CREATININE 0.95 04/12/2019    GFRA >90 04/12/2019    GFRNA 85 04/12/2019    AST 16 04/12/2019    GPT 19 04/12/2019    ALKPT 54 04/12/2019    ALBUMIN 3.4 (L) 04/12/2019    RBC 4.99 01/22/2018    HGB 14.7 01/22/2018    HCT 42.7 01/22/2018     TSH (mcUnits/mL)   Date Value   02/19/2016 2.077       SIGNIFICANT ACTIVE PROBLEMS:   Patient Active Problem List   Diagnosis   • CAD, multiple vessel   • Presence of stent in coronary artery   • DM type 2, uncontrolled, with neuropathy (CMS/HCC)   • Chronic dermatitis-suspect psoriasis   •  Erectile dysfunction   • Venous insufficiency - causing leg edema   • Type 2 diabetes mellitus with left eye affected by mild nonproliferative retinopathy and macular edema, with long-term current use of insulin (CMS/HCC)   • Microalbuminuria   • Type 2 diabetes mellitus with right eye affected by mild nonproliferative retinopathy and macular edema, with long-term current use of insulin (CMS/HCC)   • Chronic kidney disease (CKD), stage II (mild) - age, HTN, DM related   • Type 2 diabetes mellitus with stage 2 chronic kidney disease, with long-term current use of insulin (CMS/HCC)   • Arthritis of both knees - unstable and weak   • Bilateral hip joint arthritis - L>R   • Primary osteoarthritis of both hips   • Nuclear sclerosis of both eyes   • Renovascular hypertension   • Class 1 obesity due to excess calories with body mass index (BMI) of 32.0 to 32.9 in adult       MEDICATIONS:   Current Outpatient Medications   Medication Sig Dispense Refill   • glimepiride (AMARYL) 4 MG tablet Take 1 tablet by mouth 2 times daily. 180 tablet 3   • metformin (GLUCOPHAGE) 1000 MG tablet Take 1 tablet by mouth 2 times daily (with meals). 180 tablet 3   • atorvastatin (LIPITOR) 80 MG tablet Take 1 tablet by mouth at bedtime. 30 tablet 3   • metoPROLOL tartrate (LOPRESSOR) 100 MG tablet Take 1 tablet by mouth 2 times daily. 180 tablet 0   • hydrochlorothiazide (MICROZIDE) 12.5 MG capsule Take 1 capsule by mouth daily. 90 capsule 0   • quinapril (ACCUPRIL) 40 MG tablet Take 1 tablet by mouth every morning. 90 tablet 0   • insulin regular 70-30 (NOVOLIN 70/30) (70-30) 100 UNIT/ML injectable suspension Relion brand: Inject 25 units before breakfast and 30 units at supper. 10 mL 12   • sildenafil (REVATIO) 20 MG tablet Take 1 tablet by mouth daily as needed (erectile dysfunction). 10 tablet 0   • Blood Glucose Monitoring Suppl (ONE TOUCH ULTRA 2) w/Device Kit For daily use. E11.65 1 kit 1   • Insulin Syringe-Needle U-100 (RELION INSULIN  SYRINGE) 31G X 5/16\" 0.5 ML Misc Subcutaneously inject insulin twice daily. Dx E11.65 200 each 3   • DISPENSE Test strips of brand requested by patient-looking for something covered by his insurance  Dx E11.8  Testing twice per day 200 each 3   • DISPENSE levemir flex touch needles for daily injection 100 Units 3   • blood glucose test strips Test blood sugar 4 times daily as directed. Diagnosis: E11.9 120 strip 5   • aspirin 81 MG tablet Take 81 mg by mouth daily.     • nitroGLYcerin (NITROSTAT) 0.4 MG SL tablet Place 1 tablet under the tongue every 5 minutes as needed for Chest pain. 25 tablet 1     No current facility-administered medications for this visit.        PAST MEDICAL HISTORY:  Past Medical History:   Diagnosis Date   • Arthritis    • Diabetes mellitus (CMS/[facility])    • Essential (primary) hypertension    • Hyperlipidemia         FAMILY HISTORY:  Family History   Problem Relation Age of Onset   • Diabetes Paternal Grandmother        SOCIAL HISTORY:  Social History     Tobacco Use   • Smoking status: Former Smoker     Packs/day: 3.00     Years: 15.00     Pack years: 45.00   • Smokeless tobacco: Former User   Substance Use Topics   • Alcohol use: Yes     Alcohol/week: 6.0 standard drinks     Types: 6 Cans of beer per week     Frequency: 2-3 times a week     Drinks per session: 1 or 2   • Drug use: No        ALLERGIES:  ALLERGIES:  No Known Allergies    REVIEW OF SYSTEMS:                GENERAL/CONSTITUTIONAL GASTROINTESTINAL   [] YES   [x] NO  Excessive fatigue [] YES   [x] NO  Abdominal pain   [] YES   [x] NO  Excessive weakness [] YES   [x] NO  Indigestion-heartburn   [] YES   [x] NO  Unexplained weight loss [] YES   [x] NO  Nausea   [] YES   [x] NO  Unexplained weight gain [] YES   [x] NO  Vomiting   [] YES   [x] NO  Change in appetite [] YES   [x] NO  Diarrhea   [] YES   [x] NO  Fevers [] YES   [x] NO  Constipation   [] YES   [x] NO  Chills [] YES   [x] NO  Black or blood stools   [] YES   [x] NO  Cold  intolerance    [] YES   [x] NO  Heat intolerance GENITOURINARY   [] YES   [x] NO  Excessive sweating [] YES   [x] NO  Frequent urination   [x] YES   [] NO  Excessive thirst [] YES   [x] NO  Excessive urination   [x] YES   [] NO  Loss of sexual interest [] YES   [x] NO  Difficulty-painful urination    [] YES   [x] NO  Blood in the urine   NEUROLOGIC [] YES   [x] NO  Menstrual problems   [] YES   [x] NO  Frequent headaches    [] YES   [x] NO  Severe headaches MUSCULOSKELETAL   [] YES   [x] NO  Lightheadedness [] YES   [x] NO  Swollen joints   [] YES   [x] NO  Dizziness [x] YES   [] NO  Stiff or painful joints   [] YES   [x] NO  Loss of consciousness [] YES   [x] NO  Neck pain   [x] YES   [] NO  Numbness-tingling hands [] YES   [x] NO  Back pain   [x] YES   [] NO  Numbness-tingling feet [] YES   [x] NO  Muscle pain   [] YES   [x] NO  Tremors    [] YES   [x] NO  Seizures SKIN    [] YES   [x] NO  Rash   HEENT [] YES   [x] NO  Excessive dryness   [x] YES   [] NO  Blurry vision [] YES   [x] NO  Abnormal hair growth   [] YES   [x] NO  Double vision [] YES   [x] NO  Abnormal hair loss   [] YES   [x] NO  Visual disturbance [] YES   [x] NO  Ulcer   [] YES   [x] NO  Eye pain [] YES   [x] NO  New growths or lumps   [] YES   [x] NO  Hoarse/voice changes [] YES   [x] NO  Easy bruising or bleeding   [] YES   [x] NO  Difficult swallowing    [] YES   [x] NO  Neck swelling PSYCHIATRIC    [] YES   [x] NO  Difficulty concentrating   RESPIRATORY [] YES   [x] NO  Changes in mood   [x] YES   [] NO  Frequent cough [] YES   [x] NO  Restlessness   [] YES   [x] NO  Snoring [] YES   [x] NO  Changes in sleep pattern   [] YES   [x] NO  Wheezing [] YES   [x] NO  Suicidal thoughts   [] YES   [x] NO  Unusual SOB [] YES   [x] NO  Homicidal thoughts       CARDIOVASCULAR    [] YES   [x] NO  Chest pain or discomfort    [] YES   [x] NO  Palpitation/irregular beat    [] YES   [x] NO  Leg swelling      On the review of systems checklist today a few  concerns were listed.  Nothing acute.  Keeps in regular contact with PCP.    PHYSICAL EXAMINATION:   Visit Vitals  /86   Pulse 76   Ht 5' 8\" (1.727 m)   Wt 95.2 kg   BMI 31.91 kg/m²      General:  Well-nourished appearing male, in no obvious or acute distress.   Psychiatric:  Alert and oriented x3.  Pleasant and appropriate, normal insight.     ASSESSMENT AND PLAN:   Type 2 diabetes mellitus with hyperglycemia, with long-term current use of insulin (CMS/HCC)  (primary encounter diagnosis)  Comment: Stable; not at goal  Plan: POCT BLOOD SUGAR HAND HELD DEVICE, POCT         GLYCOHEMOGLOBIN ANALYZER, VENIPUNCTURE FINGER         HEEL EAR STICK, glimepiride (AMARYL) 4 MG         tablet, metformin (GLUCOPHAGE) 1000 MG tablet,         BASIC METABOLIC PANEL, GLYCOHEMOGLOBIN        Discussed insulin administration and dosing with an insulin syringe.  It is estimated that Elton is receiving an extra 5 units of insulin because he was using the bottom of the plunger as a reference line.  Novolin 70/30 decreased to 25 units in the morning.  Encouraged Elton to take his insulin 15 minutes prior to a meal.  Discussed treatment of hypoglycemia and the importance of monitoring blood sugar before breakfast and dinner.  He is instructed to update me in 1-2 weeks with blood sugars or sooner if having hypoglycemia via telephone or [facility]. He will follow up with me or BABAK Landeros in 6 months.    Type 2 diabetes mellitus with diabetic nephropathy, with long-term current use of insulin (CMS/HCC)  Comment: Stable  Plan: MICROALBUMIN URINE RANDOM        Continue quinapril and follows with nephrology.      Type 2 diabetes mellitus with both eyes affected by mild nonproliferative retinopathy and macular edema, with long-term current use of insulin (CMS/HCC)  Comment: Not at goal  Plan: Encourage close follow up with ophthalmology.  Last visit was in January 2019.    Class 1 obesity due to excess calories with body mass  index (BMI) of 32.0 to 32.9 in adult, unspecified whether serious comorbidity present  Comment: Not at goal  Plan: Encouraged increased physical activity and a portion control diabetic diet.    Hyperlipidemia, unspecified hyperlipidemia type  Comment: Not at goal  Plan: atorvastatin (LIPITOR) 80 MG tablet, LIPID         PANEL WITH REFLEX        LDL goal is <70; currently on atorvastatin 80 mg.  Continue to monitor at this time.      Essential hypertension, benign  Comment: Not at goal  Plan: Hypertensive at today's visit.  Will update PCP.      It was a pleasure to meet with Elton today.  His last A1c was 7.3% reflecting good control.  Will target an A1c of less than 7% at his next check.  Instructed to test blood sugar two times a day.  He should record his blood sugars and communicate the numbers to the clinic in 1-2 weeks via phone, mail, or [facility].     Return in about 6 months (around 4/25/2020) for Nurse BP check in 1-2 wks, Diabetes follow up.    Greater than 50% of a 24 minute visit was spent in counseling on diabetes self-management topics.                    (1) obesity (BMI greater than 25)

## 2021-03-17 NOTE — H&P PST ADULT - HISTORY OF PRESENT ILLNESS
Pt states she recently had abdominal surgery. During a post op visit, she had blood work done which showed an elevated WBC and further imaging detected it to be her gallbladder. Pt also states she has gallstones. Denies any symptoms at this time. Pt now for listed procedure. Denies any chest pain, difficulty breathing, SOB, palpitations, dysuria, URI, or any other infections in the last 2 weeks. Denies any recent travel, contact, or exposure to any persons with known or suspected COVID-19. Pt also denies COVID testing within the last 2 weeks. Denies any suicidal or homicidal ideations. Pt advised to self quarantine until day of procedure. Exercise tolerance of 1-2 flights of stairs without dyspnea. ADRIEN reviewed with patient. Pt verbalized understanding of all pre-operative instructions.    Anesthesia Alert  NO--Difficult Airway  NO--History of neck surgery or radiation  NO--Limited ROM of neck  NO--History of Malignant hyperthermia  NO--No personal or family history of Pseudocholinesterase deficiency.  NO--Prior Anesthesia Complication  NO--Latex Allergy  NO--Loose teeth  NO--History of Rheumatoid Arthritis  NO--ADRIEN  NO--Other_____

## 2021-03-17 NOTE — H&P PST ADULT - NSANTHOSAYNRD_GEN_A_CORE
No. ADRIEN screening performed.  STOP BANG Legend: 0-2 = LOW Risk; 3-4 = INTERMEDIATE Risk; 5-8 = HIGH Risk

## 2021-03-17 NOTE — H&P PST ADULT - REASON FOR ADMISSION
54 yo female presents for PAST in preparation for laparoscopic cholecystectomy possible open on 3/25/2021 under general anesthesia by Dr. Bolden ([facility])

## 2021-03-17 NOTE — H&P PST ADULT - NSICDXPASTSURGICALHX_GEN_ALL_CORE_FT
PAST SURGICAL HISTORY:  History of bowel resection with hernia repair    Larynx polyp removed    S/P tubal ligation 20 years ago

## 2021-03-22 ENCOUNTER — OUTPATIENT (OUTPATIENT)
Dept: OUTPATIENT SERVICES | Facility: HOSPITAL | Age: 56
LOS: 1 days | Discharge: HOME | End: 2021-03-22

## 2021-03-22 DIAGNOSIS — Z11.59 ENCOUNTER FOR SCREENING FOR OTHER VIRAL DISEASES: ICD-10-CM

## 2021-03-22 DIAGNOSIS — J38.1 POLYP OF VOCAL CORD AND LARYNX: Chronic | ICD-10-CM

## 2021-03-22 DIAGNOSIS — Z98.51 TUBAL LIGATION STATUS: Chronic | ICD-10-CM

## 2021-03-22 DIAGNOSIS — Z90.49 ACQUIRED ABSENCE OF OTHER SPECIFIED PARTS OF DIGESTIVE TRACT: Chronic | ICD-10-CM

## 2021-03-22 PROBLEM — E78.00 PURE HYPERCHOLESTEROLEMIA, UNSPECIFIED: Chronic | Status: ACTIVE | Noted: 2021-03-17

## 2021-03-24 NOTE — ASU PATIENT PROFILE, ADULT - PSH
History of bowel resection  with hernia repair  Larynx polyp  removed  S/P tubal ligation  20 years ago

## 2021-03-24 NOTE — ASU PATIENT PROFILE, ADULT - PMH
Diabetes    High cholesterol    Hypertension    Marijuana use, continuous  DOESN'T USE ANY MARIJUANA

## 2021-03-25 ENCOUNTER — OUTPATIENT (OUTPATIENT)
Dept: OUTPATIENT SERVICES | Facility: HOSPITAL | Age: 56
LOS: 1 days | Discharge: HOME | End: 2021-03-25
Payer: COMMERCIAL

## 2021-03-25 ENCOUNTER — RESULT REVIEW (OUTPATIENT)
Age: 56
End: 2021-03-25

## 2021-03-25 VITALS
RESPIRATION RATE: 14 BRPM | DIASTOLIC BLOOD PRESSURE: 68 MMHG | SYSTOLIC BLOOD PRESSURE: 156 MMHG | HEART RATE: 62 BPM | OXYGEN SATURATION: 98 %

## 2021-03-25 VITALS
SYSTOLIC BLOOD PRESSURE: 137 MMHG | RESPIRATION RATE: 20 BRPM | HEART RATE: 57 BPM | OXYGEN SATURATION: 100 % | TEMPERATURE: 99 F | DIASTOLIC BLOOD PRESSURE: 62 MMHG | HEIGHT: 62 IN | WEIGHT: 123.9 LBS

## 2021-03-25 DIAGNOSIS — Z98.51 TUBAL LIGATION STATUS: Chronic | ICD-10-CM

## 2021-03-25 DIAGNOSIS — Z90.49 ACQUIRED ABSENCE OF OTHER SPECIFIED PARTS OF DIGESTIVE TRACT: Chronic | ICD-10-CM

## 2021-03-25 DIAGNOSIS — J38.1 POLYP OF VOCAL CORD AND LARYNX: Chronic | ICD-10-CM

## 2021-03-25 LAB — GLUCOSE BLDC GLUCOMTR-MCNC: 130 MG/DL — HIGH (ref 70–99)

## 2021-03-25 PROCEDURE — 88304 TISSUE EXAM BY PATHOLOGIST: CPT | Mod: 26

## 2021-03-25 RX ORDER — HYDROMORPHONE HYDROCHLORIDE 2 MG/ML
0.5 INJECTION INTRAMUSCULAR; INTRAVENOUS; SUBCUTANEOUS
Refills: 0 | Status: DISCONTINUED | OUTPATIENT
Start: 2021-03-25 | End: 2021-03-25

## 2021-03-25 RX ORDER — OXYCODONE HYDROCHLORIDE 5 MG/1
1 TABLET ORAL
Qty: 10 | Refills: 0
Start: 2021-03-25 | End: 2021-03-27

## 2021-03-25 RX ORDER — ONDANSETRON 8 MG/1
4 TABLET, FILM COATED ORAL ONCE
Refills: 0 | Status: DISCONTINUED | OUTPATIENT
Start: 2021-03-25 | End: 2021-04-08

## 2021-03-25 RX ORDER — IBUPROFEN 200 MG
1 TABLET ORAL
Qty: 28 | Refills: 0
Start: 2021-03-25 | End: 2021-03-31

## 2021-03-25 RX ORDER — SODIUM CHLORIDE 9 MG/ML
1000 INJECTION, SOLUTION INTRAVENOUS
Refills: 0 | Status: DISCONTINUED | OUTPATIENT
Start: 2021-03-25 | End: 2021-04-08

## 2021-03-25 RX ORDER — OXYCODONE AND ACETAMINOPHEN 5; 325 MG/1; MG/1
1 TABLET ORAL EVERY 4 HOURS
Refills: 0 | Status: DISCONTINUED | OUTPATIENT
Start: 2021-03-25 | End: 2021-03-25

## 2021-03-25 RX ADMIN — SODIUM CHLORIDE 100 MILLILITER(S): 9 INJECTION, SOLUTION INTRAVENOUS at 14:29

## 2021-03-25 RX ADMIN — HYDROMORPHONE HYDROCHLORIDE 0.5 MILLIGRAM(S): 2 INJECTION INTRAMUSCULAR; INTRAVENOUS; SUBCUTANEOUS at 15:09

## 2021-03-25 RX ADMIN — OXYCODONE AND ACETAMINOPHEN 1 TABLET(S): 5; 325 TABLET ORAL at 15:52

## 2021-03-25 RX ADMIN — HYDROMORPHONE HYDROCHLORIDE 0.5 MILLIGRAM(S): 2 INJECTION INTRAMUSCULAR; INTRAVENOUS; SUBCUTANEOUS at 14:50

## 2021-03-25 NOTE — BRIEF OPERATIVE NOTE - NSICDXBRIEFPROCEDURE_GEN_ALL_CORE_FT
PROCEDURES:  Laparoscopic cholecystectomy 25-Mar-2021 14:26:10  Winston Loya   PROCEDURES:  Lysis of adhesions of peritoneum 25-Mar-2021 14:31:30  Winston Loya  Lysis of adhesions of gallbladder 25-Mar-2021 14:30:56  Winston Loya  Laparoscopic cholecystectomy 25-Mar-2021 14:26:10  Winston Loya

## 2021-03-25 NOTE — ASU DISCHARGE PLAN (ADULT/PEDIATRIC) - CARE PROVIDER_API CALL
Melonie Bolden  SURGERY  [address] [place] [address]  Phone: [phone]  Fax: [phone]  Follow Up Time: 1 week

## 2021-03-25 NOTE — CHART NOTE - NSCHARTNOTEFT_GEN_A_CORE
PACU ANESTHESIA ADMISSION NOTE      Procedure: Lysis of adhesions of peritoneum    Lysis of adhesions of gallbladder    Laparoscopic cholecystectomy      Post op diagnosis:  Chronic calculous cholecystitis    Cholelithiasis        ____  Intubated  TV:______       Rate: ______      FiO2: ______    _x___  Patent Airway    _x___  Full return of protective reflexes    _x___  Full recovery from anesthesia / back to baseline status    Vitals  SPO2:-98%2l nc  HR:-60  RR:-14  B.P:-147/70  TEMP:-98.1    Mental Status:  _x___ Awake   ___x_ Alert   _____ Drowsy   _____ Sedated    Nausea/Vomiting:  _x___  NO       ______Yes,   See Post - Op Orders         Pain Scale (0-10):  __0___    Treatment: _x___ None    __x__ See Post - Op/PCA Orders    Post - Operative Fluids:   ___ Oral   ____x See Post - Op Orders    Plan: Discharge:   __x__Home       _____Floor     _____Critical Care    _____  Other:_________________    Comments:  Report endorsed to RN in pacu  Vitals stable  No anesthesia issues or complications noted.  Discharge to patient to  home when criteria met.

## 2021-03-25 NOTE — BRIEF OPERATIVE NOTE - NSICDXBRIEFPOSTOP_GEN_ALL_CORE_FT
POST-OP DIAGNOSIS:  Chronic calculous cholecystitis 25-Mar-2021 14:27:05  Winston Loya  Cholelithiasis 25-Mar-2021 14:26:45  Winston Loya

## 2021-03-25 NOTE — BRIEF OPERATIVE NOTE - OPERATION/FINDINGS
Discussed lid hygiene, warm compress and eyelid wash. Laparoscopic cholecystectomy with extensive lysis of adhesions: Inflamed, dilated gallbladder with abdominal adhesions (lysed). Critical view obtained. cystic duct and cystic artery identified. Duct and artery were clipped and ligated. Hemostasis achieved, with electrocautery. Incisions closed in layers. Laparoscopic cholecystectomy with extensive lysis of adhesions: Inflamed, dilated gallbladder with abdominal adhesions (lysed). Critical view obtained. cystic duct and cystic artery identified. Duct and artery were clipped and ligated, percutaneous cholecystostomy tube removed. Hemostasis achieved, with electrocautery. Incisions closed in layers.

## 2021-03-25 NOTE — ASU DISCHARGE PLAN (ADULT/PEDIATRIC) - ASU DC SPECIAL INSTRUCTIONSFT
Pain: Take ibuprofen, tylenol every 6 hour as needed for pain. Take oxycodone 5mg as needed for breakthrough pain. Please be aware, the medication can cause drowsiness, so reserve for night time use or severe pain, avoid drive or make important decision while you are on narcotic pain medications. Your medication have been sent to your pharmacy. Please continue your own home medications as previously prescribed. Contact your primary care provider with any questions.  Wound care: Keep your dressing clean, dry, and intact until seen by your doctor in the next appointment  Please plan to follow up  with Dr. Bolden in 1 week. Contact the office to confirm appointment. The phone number and address are available within discharge paperwork.  Please call the office or return to Emergency Department if you experience fever, shortness of breath, increasing abdominal pain, vomiting.

## 2021-03-29 LAB — SURGICAL PATHOLOGY STUDY: SIGNIFICANT CHANGE UP

## 2021-04-02 DIAGNOSIS — N73.6 FEMALE PELVIC PERITONEAL ADHESIONS (POSTINFECTIVE): ICD-10-CM

## 2021-04-02 DIAGNOSIS — Z87.891 PERSONAL HISTORY OF NICOTINE DEPENDENCE: ICD-10-CM

## 2021-04-02 DIAGNOSIS — E78.00 PURE HYPERCHOLESTEROLEMIA, UNSPECIFIED: ICD-10-CM

## 2021-04-02 DIAGNOSIS — I10 ESSENTIAL (PRIMARY) HYPERTENSION: ICD-10-CM

## 2021-04-02 DIAGNOSIS — K80.12 CALCULUS OF GALLBLADDER WITH ACUTE AND CHRONIC CHOLECYSTITIS WITHOUT OBSTRUCTION: ICD-10-CM

## 2021-04-02 DIAGNOSIS — Z79.4 LONG TERM (CURRENT) USE OF INSULIN: ICD-10-CM

## 2021-04-02 DIAGNOSIS — Z98.51 TUBAL LIGATION STATUS: ICD-10-CM

## 2021-04-02 DIAGNOSIS — E11.9 TYPE 2 DIABETES MELLITUS WITHOUT COMPLICATIONS: ICD-10-CM

## 2021-04-10 LAB
CULTURE RESULTS: SIGNIFICANT CHANGE UP
SPECIMEN SOURCE: SIGNIFICANT CHANGE UP

## 2021-04-12 NOTE — CDI QUERY NOTE - NSCDIOTHERTXTBX_GEN_ALL_CORE_HH
_________________________________________________________________________________________________________________________________    55 F, Diagnosis:  Strangulated Ventral Hernia with Small Bowel Obstruction  Clinical Indicator:    ED: Flowsheet: 2/10/2021: V/S:  IW=766  Labs:  WBC=19.78,  Bands=7.9, Lactic acid=4.7    2/10/2021: H/P: Surgical Attending:   Patient at high risk for worsening sepsis upon surgical intervention  2021: Operative Report: Surgical Attending:                         Preoperative Dx:  Strangulated Ventral Hernia with Small Bowel Obstruction; Sepsis                       Postop Dx: Strangulated Ventral hernia w/ Small Bowel Obstruction, Sepsis, Extensive adhesions bet small bowel, greater omentum, mesentery and                                             hernial sac  2021: Chart Note: Surgical Attending:  ischemic bowel , s/p exp lap and resection , septic shock post op , intubated and sedated , admit to SICU ,continue                                                                                  resuscitation , BS antibiotics             2021: Progress note: Attending: Extubated () - on RA, sat 99%, s/p Right hemicolectomy with primary anastomosis  2021: Progress Note: Attendin years old female admitted due to incarcerated umbilical hernia, POD#6, S/P Exploratory laparotomy, small bowel                         resection, right hemicolectomy. Currently patient stable, on NPO with TPN, pain controlled, having BM, No fever, Had an increased in WBC 22.1  2021: Progress Note: Attendin years old female admitted due to incarcerated umbilical hernia, POD#11, S/P Exploratory laparotomy, small bowel                                                                        resection, right hemicolectomy.  2021:  D/C Note: Diagnosis: Incarcerated hernia,   s/p exploratory laparotomy, bowel resection                                           resection, right hemicolectomy.     Meds:   Piperacillin/tazobactam ( 2/10/2021 – 2021)    Based on your professional judgment and clinical indicator, please  clarify if the diagnosis of sepsis can be further specified as:     [ ] Sepsis ruled in and resolved at the time of discharge   [ ] Sepsis ruled out  [ ] Other (please specify)  [ ] Unable to clinically determine    Thank you,   Pamela _________________________________________________________________________________________________________________________________    55 F, Diagnosis:  Strangulated Ventral Hernia with Small Bowel Obstruction  Clinical Indicator:    ED: Flowsheet: 2/10/2021: V/S:  VU=148  Labs:  WBC=19.78,  Bands=7.9, Lactic acid=4.7    2/10/2021: H/P: Surgical Attending:   Patient at high risk for worsening sepsis upon surgical intervention  2021: Operative Report: Surgical Attending:                         Preoperative Dx:  Strangulated Ventral Hernia with Small Bowel Obstruction; Sepsis                       Postop Dx: Strangulated Ventral hernia w/ Small Bowel Obstruction, Sepsis, Extensive adhesions bet small bowel, greater omentum, mesentery and                                             hernial sac  2021: Chart Note: Surgical Attending:  ischemic bowel , s/p exp lap and resection , septic shock post op , intubated and sedated , admit to SICU ,continue                                                                                  resuscitation , BS antibiotics             2021: Progress note: Attending: Extubated () - on RA, sat 99%, s/p Right hemicolectomy with primary anastomosis  2021: Progress Note: Attendin years old female admitted due to incarcerated umbilical hernia, POD#6, S/P Exploratory laparotomy, small bowel                         resection, right hemicolectomy. Currently patient stable, on NPO with TPN, pain controlled, having BM, No fever, Had an increased in WBC 22.1  2021: Progress Note: Attendin years old female admitted due to incarcerated umbilical hernia, POD#11, S/P Exploratory laparotomy, small bowel                                                                        resection, right hemicolectomy.  2021:  D/C Note: Diagnosis: Incarcerated hernia,   s/p exploratory laparotomy, bowel resection                                           resection, right hemicolectomy.     Meds:   Piperacillin/tazobactam ( 2/10/2021 – 2021)    Based on your professional judgment and clinical indicator, please  clarify if the diagnosis of sepsis can be further specified as:     [ ] Sepsis ruled in and resolved at the time of discharge   [ ] Sepsis ruled out  [ ] Other (please specify)  [ ] Unable to clinically determine    Thank you.

## 2021-07-22 ENCOUNTER — APPOINTMENT (OUTPATIENT)
Dept: ENDOCRINOLOGY | Facility: CLINIC | Age: 56
End: 2021-07-22
Payer: COMMERCIAL

## 2021-07-22 VITALS
HEART RATE: 58 BPM | HEIGHT: 62 IN | TEMPERATURE: 97.3 F | OXYGEN SATURATION: 98 % | DIASTOLIC BLOOD PRESSURE: 74 MMHG | BODY MASS INDEX: 23.37 KG/M2 | WEIGHT: 127 LBS | SYSTOLIC BLOOD PRESSURE: 130 MMHG | RESPIRATION RATE: 18 BRPM

## 2021-07-22 PROCEDURE — 99072 ADDL SUPL MATRL&STAF TM PHE: CPT

## 2021-07-22 PROCEDURE — 99214 OFFICE O/P EST MOD 30 MIN: CPT

## 2021-07-22 RX ORDER — AMOXICILLIN AND CLAVULANATE POTASSIUM 875; 125 MG/1; MG/1
875-125 TABLET, COATED ORAL
Qty: 10 | Refills: 0 | Status: COMPLETED | COMMUNITY
Start: 2021-02-22

## 2021-07-22 RX ORDER — OXYCODONE 5 MG/1
5 TABLET ORAL
Qty: 10 | Refills: 0 | Status: COMPLETED | COMMUNITY
Start: 2021-03-25

## 2021-07-22 RX ORDER — METFORMIN HYDROCHLORIDE 1000 MG/1
1000 TABLET, COATED ORAL
Qty: 60 | Refills: 6 | Status: COMPLETED | COMMUNITY
End: 2021-07-22

## 2021-07-22 RX ORDER — GABAPENTIN 300 MG/1
300 CAPSULE ORAL
Qty: 90 | Refills: 0 | Status: COMPLETED | COMMUNITY
Start: 2021-06-07

## 2021-07-22 RX ORDER — TAMSULOSIN HYDROCHLORIDE 0.4 MG/1
0.4 CAPSULE ORAL
Qty: 30 | Refills: 0 | Status: COMPLETED | COMMUNITY
Start: 2021-02-22

## 2021-07-22 RX ORDER — DULAGLUTIDE 0.75 MG/.5ML
0.75 INJECTION, SOLUTION SUBCUTANEOUS
Qty: 4 | Refills: 5 | Status: COMPLETED | COMMUNITY
End: 2021-07-22

## 2021-07-22 RX ORDER — IBUPROFEN 600 MG/1
600 TABLET, FILM COATED ORAL
Qty: 28 | Refills: 0 | Status: COMPLETED | COMMUNITY
Start: 2021-03-25

## 2021-07-22 RX ORDER — PREGABALIN 75 MG/1
75 CAPSULE ORAL
Qty: 60 | Refills: 0 | Status: ACTIVE | COMMUNITY
Start: 2021-07-06

## 2021-07-22 NOTE — DATA REVIEWED
[FreeTextEntry1] : 5/24/21 tsh 0.974vit b 12 350, vit d 30.8, c peptide 2.7, glucose 110, hgb 6.1, iron 11, chol 96, trig 147, hdl 47, 24, microalbubin 840.9

## 2021-07-22 NOTE — HISTORY OF PRESENT ILLNESS
[Finger Stick] : Finger Stick: Yes [Continuous Glucose Monitoring] : Continuous Glucose Monitoring: No [Hypoglycemia] : Patient is not hypoglycemic. [FreeTextEntry9] : 109-130 [FreeTextEntry1] : test 1-3 x a day

## 2021-07-22 NOTE — PHYSICAL EXAM
[Alert] : alert [Well Nourished] : well nourished [Healthy Appearance] : healthy appearance [No Acute Distress] : no acute distress [Well Developed] : well developed [Normal Sclera/Conjunctiva] : normal sclera/conjunctiva [EOMI] : extra ocular movement intact [No Proptosis] : no proptosis [Normal Outer Ear/Nose] : the ears and nose were normal in appearance [Normal Hearing] : hearing was normal [Normal Oropharynx] : the oropharynx was normal [Thyroid Not Enlarged] : the thyroid was not enlarged [No Thyroid Nodules] : no palpable thyroid nodules [No Respiratory Distress] : no respiratory distress [No Accessory Muscle Use] : no accessory muscle use [Clear to Auscultation] : lungs were clear to auscultation bilaterally [Normal S1, S2] : normal S1 and S2 [Normal Rate] : heart rate was normal [Regular Rhythm] : with a regular rhythm [Carotids Normal] : carotid pulses were normal with no bruits [No Edema] : no peripheral edema [Pedal Pulses Normal] : the pedal pulses are present [Normal Bowel Sounds] : normal bowel sounds [Not Tender] : non-tender [Not Distended] : not distended [Soft] : abdomen soft [Normal Anterior Cervical Nodes] : no anterior cervical lymphadenopathy [Normal Posterior Cervical Nodes] : no posterior cervical lymphadenopathy [No CVA Tenderness] : no ~M costovertebral angle tenderness [No Spinal Tenderness] : no spinal tenderness [Spine Straight] : spine straight [No Stigmata of Cushings Syndrome] : no stigmata of Cushings Syndrome [Normal Gait] : normal gait [Normal Strength/Tone] : muscle strength and tone were normal [No Rash] : no rash [Acanthosis Nigricans] : no acanthosis nigricans [Right Foot Was Examined] : right foot ~C was examined [Left Foot Was Examined] : left foot ~C was examined [Normal] : normal [Full ROM] : with full range of motion [Diminished Throughout Both Feet] : normal tactile sensation with monofilament testing throughout both feet [#1 Diminished] : number 1 was normal [#2 Diminished] : number 2 was normal [#3 Diminished] : number 3 was normal [#4 Diminished] : number 4 was normal [#5 Diminished] : number 5 was normal [#6 Diminished] : number 6 was normal [#7 Diminished] : number 7 was normal [#8 Diminished] : number 8 was normal [#9 Diminished] : number 9 was normal [#10 Diminished] : number 10 was normal [Cranial Nerves Intact] : cranial nerves 2-12 were intact [Normal Reflexes] : deep tendon reflexes were 2+ and symmetric [No Tremors] : no tremors [Oriented x3] : oriented to person, place, and time [Normal Affect] : the affect was normal [Normal Mood] : the mood was normal

## 2021-07-23 PROBLEM — F12.90 CANNABIS USE, UNSPECIFIED, UNCOMPLICATED: Chronic | Status: ACTIVE | Noted: 2021-02-11

## 2021-11-03 NOTE — ED PROVIDER NOTE - NS_ATTENDINGSCRIBE_ED_ALL_ED
1050: admitted from OR    1155: increase insulin to 6.7 units and juarez to 100 mcg    1155: Labs and ABG drawn    1200: initial PO2  67. Increased to 100% and increasd PEEP to 10 cm    1205: awake, moves all 4 extremities, 1 mg versed given    1210: x ray    1215: low SVR, 1 albumen. Patient awake, 1 mg dilaudid given    1315: decrease the FiO2 to 80%    1335: progress note this afternoon switched to FAVIO Granger RN I personally performed the service described in the documentation recorded by the scribe in my presence, and it accurately and completely records my words and actions.

## 2022-01-03 NOTE — BRIEF OPERATIVE NOTE - SPECIMENS
Gallbladder Cosentyx Counseling:  I discussed with the patient the risks of Cosentyx including but not limited to worsening of Crohn's disease, immunosuppression, allergic reactions and infections.  The patient understands that monitoring is required including a PPD at baseline and must alert us or the primary physician if symptoms of infection or other concerning signs are noted.

## 2022-01-04 RX ORDER — ATORVASTATIN CALCIUM 40 MG/1
40 TABLET, FILM COATED ORAL
Qty: 30 | Refills: 6 | Status: ACTIVE | COMMUNITY
Start: 2020-08-17 | End: 1900-01-01

## 2022-02-04 ENCOUNTER — OUTPATIENT (OUTPATIENT)
Dept: OUTPATIENT SERVICES | Facility: HOSPITAL | Age: 57
LOS: 1 days | Discharge: HOME | End: 2022-02-04

## 2022-02-04 VITALS
TEMPERATURE: 97 F | RESPIRATION RATE: 18 BRPM | DIASTOLIC BLOOD PRESSURE: 75 MMHG | HEIGHT: 62 IN | OXYGEN SATURATION: 100 % | SYSTOLIC BLOOD PRESSURE: 181 MMHG | WEIGHT: 145.06 LBS | HEART RATE: 63 BPM

## 2022-02-04 VITALS — SYSTOLIC BLOOD PRESSURE: 157 MMHG | HEART RATE: 56 BPM | DIASTOLIC BLOOD PRESSURE: 76 MMHG

## 2022-02-04 DIAGNOSIS — Z90.49 ACQUIRED ABSENCE OF OTHER SPECIFIED PARTS OF DIGESTIVE TRACT: Chronic | ICD-10-CM

## 2022-02-04 DIAGNOSIS — J38.1 POLYP OF VOCAL CORD AND LARYNX: Chronic | ICD-10-CM

## 2022-02-04 DIAGNOSIS — Z98.51 TUBAL LIGATION STATUS: Chronic | ICD-10-CM

## 2022-02-04 LAB — GLUCOSE BLDC GLUCOMTR-MCNC: 104 MG/DL — HIGH (ref 70–99)

## 2022-02-04 RX ORDER — INSULIN GLARGINE 100 [IU]/ML
30 INJECTION, SOLUTION SUBCUTANEOUS
Qty: 0 | Refills: 0 | DISCHARGE

## 2022-02-04 RX ORDER — ATENOLOL AND CHLORTHALIDONE 50; 25 MG/1; MG/1
1 TABLET ORAL
Qty: 0 | Refills: 0 | DISCHARGE

## 2022-02-04 NOTE — ASU PREOP CHECKLIST - ASSESSMENT, HISTORY & PHYSICAL COMPLETED AND ON MEDICAL RECORD
[facility]  Report of Psychiatric Progress Note    Luciana Richard Patient Status:  Inpatient    1969 MRN [id number]   [facility] 7NE-A Attending Brandie Odom MD   Hosp Day # 5 PCP Mahnaz Noriega MD     Date of Admission: head CT showed a tiny SDH so she was admitted for monitoring. No intervention needed per neuro-surg. Her ASA was held.  She was seen by neuro and continued on her AED's-- Lamictal and Trileptal.     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  No phenothiazines pe Fluticasone Propionate (FLONASE) 50 MCG/ACT nasal spray 1 spray, 1 spray, Each Nare, Daily  •  lamoTRIgine (LAMICTAL) tab 200 mg, 200 mg, Oral, TID  •  cetirizine (ZYRTEC) tab 10 mg, 10 mg, Oral, Daily  •  [place] Carbonate ER (LITHOBID) CR tab 900 mg, 900 for sleep, grandiose thoughts    Mental Status Exam:     Risk Assessment  Suicidal ideation: no suicidal ideation  Homicidal ideation: None noted    Objective       10/05/18  0841   BP: 133/89   Pulse: 90   Resp: 20   Temp: 98.6 °F (37 °C)     Appearance: complete DOP/done

## 2022-02-04 NOTE — ASU PATIENT PROFILE, ADULT - NSICDXPASTMEDICALHX_GEN_ALL_CORE_FT
PAST MEDICAL HISTORY:  Diabetes     High cholesterol     Hypertension     Marijuana use, continuous DOESN'T USE ANY MARIJUANA    Neuropathy, diabetic

## 2022-02-04 NOTE — ASU PATIENT PROFILE, ADULT - FALL HARM RISK - HARM RISK INTERVENTIONS

## 2022-02-08 DIAGNOSIS — E11.36 TYPE 2 DIABETES MELLITUS WITH DIABETIC CATARACT: ICD-10-CM

## 2022-02-08 DIAGNOSIS — H25.89 OTHER AGE-RELATED CATARACT: ICD-10-CM

## 2022-02-08 DIAGNOSIS — E11.40 TYPE 2 DIABETES MELLITUS WITH DIABETIC NEUROPATHY, UNSPECIFIED: ICD-10-CM

## 2022-02-08 DIAGNOSIS — Z79.4 LONG TERM (CURRENT) USE OF INSULIN: ICD-10-CM

## 2022-02-08 DIAGNOSIS — E78.00 PURE HYPERCHOLESTEROLEMIA, UNSPECIFIED: ICD-10-CM

## 2022-02-08 DIAGNOSIS — I10 ESSENTIAL (PRIMARY) HYPERTENSION: ICD-10-CM

## 2022-02-11 ENCOUNTER — OUTPATIENT (OUTPATIENT)
Dept: OUTPATIENT SERVICES | Facility: HOSPITAL | Age: 57
LOS: 1 days | Discharge: HOME | End: 2022-02-11

## 2022-02-11 VITALS
DIASTOLIC BLOOD PRESSURE: 65 MMHG | HEIGHT: 62 IN | HEART RATE: 50 BPM | TEMPERATURE: 96 F | OXYGEN SATURATION: 100 % | RESPIRATION RATE: 18 BRPM | WEIGHT: 139.99 LBS | SYSTOLIC BLOOD PRESSURE: 149 MMHG

## 2022-02-11 VITALS
RESPIRATION RATE: 18 BRPM | SYSTOLIC BLOOD PRESSURE: 120 MMHG | HEART RATE: 49 BPM | DIASTOLIC BLOOD PRESSURE: 62 MMHG | OXYGEN SATURATION: 99 %

## 2022-02-11 DIAGNOSIS — J38.1 POLYP OF VOCAL CORD AND LARYNX: Chronic | ICD-10-CM

## 2022-02-11 DIAGNOSIS — Z98.51 TUBAL LIGATION STATUS: Chronic | ICD-10-CM

## 2022-02-11 DIAGNOSIS — Z90.49 ACQUIRED ABSENCE OF OTHER SPECIFIED PARTS OF DIGESTIVE TRACT: Chronic | ICD-10-CM

## 2022-02-11 LAB — GLUCOSE BLDC GLUCOMTR-MCNC: 106 MG/DL — HIGH (ref 70–99)

## 2022-02-11 RX ORDER — ATORVASTATIN CALCIUM 80 MG/1
0 TABLET, FILM COATED ORAL
Qty: 0 | Refills: 0 | DISCHARGE

## 2022-02-11 RX ORDER — LISINOPRIL 2.5 MG/1
1 TABLET ORAL
Qty: 0 | Refills: 0 | DISCHARGE

## 2022-02-11 RX ORDER — INSULIN GLARGINE 100 [IU]/ML
25 INJECTION, SOLUTION SUBCUTANEOUS
Qty: 0 | Refills: 0 | DISCHARGE

## 2022-02-11 RX ORDER — ATENOLOL AND CHLORTHALIDONE 50; 25 MG/1; MG/1
1 TABLET ORAL
Qty: 0 | Refills: 0 | DISCHARGE

## 2022-02-11 NOTE — PACU DISCHARGE NOTE - COMMENTS
56 y o female S/P Right ECCE with IOL Implant, LSB/MAC without complications. VS /63 HR 45 RR 16 Sao2 99%. Pt tolerated procedure well.

## 2022-02-11 NOTE — ASU PATIENT PROFILE, ADULT - FALL HARM RISK - UNIVERSAL INTERVENTIONS
Bed in lowest position, wheels locked, appropriate side rails in place/Call bell, personal items and telephone in reach/Instruct patient to call for assistance before getting out of bed or chair/Non-slip footwear when patient is out of bed/[place] to call system/Physically safe environment - no spills, clutter or unnecessary equipment/Purposeful Proactive Rounding/Room/bathroom lighting operational, light cord in reach

## 2022-02-11 NOTE — ASU DISCHARGE PLAN (ADULT/PEDIATRIC) - NS MD DC FALL RISK RISK
For information on Fall & Injury Prevention, visit: [email or web address] OR  [email or web address] OR  https://www.cdc.gov/steadi/patient.html

## 2022-02-17 DIAGNOSIS — Z96.1 PRESENCE OF INTRAOCULAR LENS: ICD-10-CM

## 2022-02-17 DIAGNOSIS — E78.00 PURE HYPERCHOLESTEROLEMIA, UNSPECIFIED: ICD-10-CM

## 2022-02-17 DIAGNOSIS — H26.8 OTHER SPECIFIED CATARACT: ICD-10-CM

## 2022-02-17 DIAGNOSIS — Z98.42 CATARACT EXTRACTION STATUS, LEFT EYE: ICD-10-CM

## 2022-02-17 DIAGNOSIS — Z79.4 LONG TERM (CURRENT) USE OF INSULIN: ICD-10-CM

## 2022-02-17 DIAGNOSIS — E11.36 TYPE 2 DIABETES MELLITUS WITH DIABETIC CATARACT: ICD-10-CM

## 2022-02-17 DIAGNOSIS — I10 ESSENTIAL (PRIMARY) HYPERTENSION: ICD-10-CM

## 2022-02-17 DIAGNOSIS — E11.40 TYPE 2 DIABETES MELLITUS WITH DIABETIC NEUROPATHY, UNSPECIFIED: ICD-10-CM

## 2022-03-10 ENCOUNTER — APPOINTMENT (OUTPATIENT)
Dept: ENDOCRINOLOGY | Facility: CLINIC | Age: 57
End: 2022-03-10
Payer: COMMERCIAL

## 2022-03-10 VITALS
OXYGEN SATURATION: 98 % | RESPIRATION RATE: 18 BRPM | WEIGHT: 151 LBS | SYSTOLIC BLOOD PRESSURE: 130 MMHG | TEMPERATURE: 97.7 F | DIASTOLIC BLOOD PRESSURE: 72 MMHG | BODY MASS INDEX: 27.79 KG/M2 | HEART RATE: 51 BPM | HEIGHT: 62 IN

## 2022-03-10 DIAGNOSIS — Z86.39 PERSONAL HISTORY OF OTHER ENDOCRINE, NUTRITIONAL AND METABOLIC DISEASE: ICD-10-CM

## 2022-03-10 PROBLEM — E11.40 TYPE 2 DIABETES MELLITUS WITH DIABETIC NEUROPATHY, UNSPECIFIED: Chronic | Status: ACTIVE | Noted: 2022-02-04

## 2022-03-10 PROCEDURE — 99214 OFFICE O/P EST MOD 30 MIN: CPT

## 2022-03-10 RX ORDER — ATENOLOL AND CHLORTHALIDONE 100; 25 MG/1; MG/1
100-25 TABLET ORAL DAILY
Qty: 90 | Refills: 2 | Status: COMPLETED | COMMUNITY
Start: 2019-07-03 | End: 2022-03-10

## 2022-03-10 RX ORDER — ERTUGLIFLOZIN 5 MG/1
5 TABLET, FILM COATED ORAL
Qty: 30 | Refills: 5 | Status: COMPLETED | COMMUNITY
Start: 2022-03-10 | End: 2022-03-10

## 2022-03-10 RX ORDER — ATENOLOL 100 MG/1
100 TABLET ORAL DAILY
Qty: 90 | Refills: 2 | Status: COMPLETED | COMMUNITY
End: 2022-03-10

## 2022-03-10 RX ORDER — LISINOPRIL 30 MG/1
30 TABLET ORAL
Refills: 0 | Status: ACTIVE | COMMUNITY

## 2022-03-10 RX ORDER — ATENOLOL AND CHLORTHALIDONE 50; 25 MG/1; MG/1
50-25 TABLET ORAL
Refills: 0 | Status: ACTIVE | COMMUNITY

## 2022-03-10 NOTE — PHYSICAL EXAM
[Alert] : alert [Well Nourished] : well nourished [Healthy Appearance] : healthy appearance [No Acute Distress] : no acute distress [Well Developed] : well developed [Normal Sclera/Conjunctiva] : normal sclera/conjunctiva [EOMI] : extra ocular movement intact [PERRL] : pupils equal, round and reactive to light [No Proptosis] : no proptosis [Normal Outer Ear/Nose] : the ears and nose were normal in appearance [Normal Hearing] : hearing was normal [Supple] : the neck was supple [Thyroid Not Enlarged] : the thyroid was not enlarged [No Respiratory Distress] : no respiratory distress [No Accessory Muscle Use] : no accessory muscle use [Clear to Auscultation] : lungs were clear to auscultation bilaterally [Normal S1, S2] : normal S1 and S2 [Normal Rate] : heart rate was normal [Regular Rhythm] : with a regular rhythm [No Edema] : no peripheral edema [Pedal Pulses Normal] : the pedal pulses are present [Normal Bowel Sounds] : normal bowel sounds [Not Tender] : non-tender [Not Distended] : not distended [Soft] : abdomen soft [Normal Anterior Cervical Nodes] : no anterior cervical lymphadenopathy [Normal Posterior Cervical Nodes] : no posterior cervical lymphadenopathy [No Spinal Tenderness] : no spinal tenderness [Spine Straight] : spine straight [No Stigmata of Cushings Syndrome] : no stigmata of Cushings Syndrome [Normal Gait] : normal gait [Normal Strength/Tone] : muscle strength and tone were normal [No Rash] : no rash [Normal Reflexes] : deep tendon reflexes were 2+ and symmetric [No Tremors] : no tremors [Oriented x3] : oriented to person, place, and time [Acanthosis Nigricans] : no acanthosis nigricans

## 2022-03-10 NOTE — REVIEW OF SYSTEMS
[Recent Weight Gain (___ Lbs)] : recent weight gain: [unfilled] lbs [Negative] : Heme/Lymph [de-identified] : DM2

## 2022-03-10 NOTE — PAST MEDICAL HISTORY
[Postmenopausal] : The patient is postmenopausal [Definite ___ (Date)] : the last menstrual period was [unfilled]

## 2022-03-10 NOTE — HISTORY OF PRESENT ILLNESS
[Finger Stick] : Finger Stick: Yes [Continuous Glucose Monitoring] : Continuous Glucose Monitoring: No [Hypoglycemia] : Patient is not hypoglycemic. [FreeTextEntry9] : 120-130 [FreeTextEntry1] : test 1-2 x a day

## 2022-03-16 RX ORDER — DAPAGLIFLOZIN 5 MG/1
5 TABLET, FILM COATED ORAL DAILY
Qty: 30 | Refills: 5 | Status: COMPLETED | COMMUNITY
Start: 2022-03-10 | End: 2022-03-16

## 2022-06-23 ENCOUNTER — APPOINTMENT (OUTPATIENT)
Dept: ENDOCRINOLOGY | Facility: CLINIC | Age: 57
End: 2022-06-23

## 2022-11-22 NOTE — ASU PATIENT PROFILE, ADULT - CENTRAL VENOUS CATHETER
Arterial    Diagnosis: sepsis    Patient location during procedure: done in OR  Procedure start time: 11/22/2022 3:10 PM  Timeout: 11/22/2022 3:10 PM  Procedure end time: 11/22/2022 3:01 PM    Staffing  Authorizing Provider: Haroldo Aldridge MD  Performing Provider: Adams Anne CRNA      Preanesthetic Checklist  Completed: patient identified and IV checkedArterial  Skin Prep: alcohol swabs  Orientation: right  Location: radial    Catheter Size: 18 G  Catheter placement by Ultrasound guidance. Heme positive aspiration all ports.   Vessel Caliber: small, patent, compressibility normal  Needle advanced into vessel with real time Ultrasound guidance.Insertion Attempts: 1            no

## 2022-12-08 ENCOUNTER — APPOINTMENT (OUTPATIENT)
Dept: ENDOCRINOLOGY | Facility: CLINIC | Age: 57
End: 2022-12-08

## 2023-01-12 NOTE — BRIEF OPERATIVE NOTE - NSICDXBRIEFPROCEDURE_GEN_ALL_CORE_FT
PROCEDURES:  Negative pressure wound therapy, greater than 50 sq cm 11-Feb-2021 03:23:01  Joseph Cramer  Open repair of strangulated ventral hernia 11-Feb-2021 03:22:47  Joseph Cramer  Abdominal washout 11-Feb-2021 03:22:06  Joseph Cramer  Right hemicolectomy 11-Feb-2021 03:21:54  Joseph Cramer  Resection of small bowel 11-Feb-2021 03:21:35  Joseph Cramer  Exploratory laparotomy 11-Feb-2021 03:21:13  Joseph Cramer  
- - -

## 2023-03-13 ENCOUNTER — APPOINTMENT (OUTPATIENT)
Dept: ORTHOPEDIC SURGERY | Facility: CLINIC | Age: 58
End: 2023-03-13
Payer: COMMERCIAL

## 2023-03-13 VITALS — BODY MASS INDEX: 29.44 KG/M2 | HEIGHT: 62 IN | WEIGHT: 160 LBS

## 2023-03-13 DIAGNOSIS — M17.11 UNILATERAL PRIMARY OSTEOARTHRITIS, RIGHT KNEE: ICD-10-CM

## 2023-03-13 PROBLEM — Z00.00 ENCOUNTER FOR PREVENTIVE HEALTH EXAMINATION: Noted: 2023-03-13

## 2023-03-13 PROCEDURE — 99203 OFFICE O/P NEW LOW 30 MIN: CPT

## 2023-03-13 PROCEDURE — 73562 X-RAY EXAM OF KNEE 3: CPT | Mod: RT

## 2023-03-13 NOTE — HISTORY OF PRESENT ILLNESS
[de-identified] :  patient is a 57-year-old female here for evaluation of right knee pain.  Patient reports she was kneeling at [employer] yesterday for a long time, and have noticed a nontraumatic aching and swelling to her right knee.  She does report a history of arthritis of her right knee and feels that the pain is similar.  She is able to ambulate and bear weight however experiences mild pain when doing so.  She denies any trauma / false of the right knee in recent time.  Denies any history of injuries or surgeries right knee prior to this incident.

## 2023-03-13 NOTE — DISCUSSION/SUMMARY
[de-identified] : Treatment plan as discussed:\par \par I recommended anti-inflammatory medication.  Meloxicam sent to patient's pharmacy to be taken as needed for pain. Benefits discussed. Confirmed no contraindication to NSAIDs.\par \par I recommended patient rest, ice, compress, and elevate the right knee regularly. Encouraged activity modification as tolerable. Encouraged gentle range of motion to avoid stiffness. No gym /sports at least until further evaluation.\par \par At home AAOS knee exercises provided to patient for strengthening of surrounding musculature and improved ROM. \par \par All questions and concerns addressed to patient's satisfaction. Patient expresses full understanding of treatment plan.\par Patient will follow up in 4-6 weeks with Nain TOVAR for further evaluation treatment. will consider cortisone injections in follow-up visit if symptoms do not improve.\par Patient was seen under  supervision of Dr. Sharif.\par

## 2023-03-13 NOTE — DATA REVIEWED
[FreeTextEntry1] :   X-rays of the right knee taken in the office today: No acute fractures, subluxations, dislocations.  Well-preserved joint spaces.  Moderate patellofemoral arthritis appreciated.

## 2023-03-13 NOTE — IMAGING
[de-identified] :   Physical examination right knee:  Mild swelling appreciated medially greater than laterally.  No ecchymosis erythema appreciated skin is intact.  Patient nontender to palpation at the lateral or medial joint lines.  No tenderness of the patella tendon, quad tendon, or patella.  Positive patellar grind.  Patient has full range of motion even flexion-extension without any limitations.  Stable to varus valgus stress.  Negative Lachman's.  Negative Dannielle's.  Good strength throughout.  Sensory motor intact.  Calf is soft and nontender.  Patient able to bear weight at this time however experiences mild pain when doing so.

## 2023-04-06 ENCOUNTER — APPOINTMENT (OUTPATIENT)
Dept: ENDOCRINOLOGY | Facility: CLINIC | Age: 58
End: 2023-04-06
Payer: COMMERCIAL

## 2023-04-06 VITALS
WEIGHT: 164 LBS | RESPIRATION RATE: 18 BRPM | HEART RATE: 84 BPM | OXYGEN SATURATION: 98 % | SYSTOLIC BLOOD PRESSURE: 130 MMHG | DIASTOLIC BLOOD PRESSURE: 72 MMHG | BODY MASS INDEX: 30.18 KG/M2 | HEIGHT: 62 IN

## 2023-04-06 DIAGNOSIS — Z86.39 PERSONAL HISTORY OF OTHER ENDOCRINE, NUTRITIONAL AND METABOLIC DISEASE: ICD-10-CM

## 2023-04-06 PROCEDURE — 99214 OFFICE O/P EST MOD 30 MIN: CPT

## 2023-04-06 RX ORDER — CHLORHEXIDINE GLUCONATE 4 %
1000 LIQUID (ML) TOPICAL
Qty: 90 | Refills: 3 | Status: ACTIVE | COMMUNITY
Start: 1900-01-01 | End: 1900-01-01

## 2023-04-06 RX ORDER — ADHESIVE TAPE 3"X 2.3 YD
50 MCG TAPE, NON-MEDICATED TOPICAL
Qty: 90 | Refills: 3 | Status: ACTIVE | COMMUNITY
Start: 1900-01-01 | End: 1900-01-01

## 2023-04-06 NOTE — DATA REVIEWED
[FreeTextEntry1] : 2/23/23: urine albumin/creat ratio 306 H, chol 101, trigl 186 H, LDL 32, HDL 44 L, creat 1.37 H, BUN 28 H, GFR 45 L, K 3.4 L, A1c 8.6 H, TSH 1.5, free T4 0.9, total T3 81, B12 269 L, vit D 22, UA positive blood, protein, RBCs, trace leuks\par \par 2/14/23 thyroid sonogram\par right: interpolar 5 x 4x 5 mm TR1, interpolar region anterior 10 x 6 x 8 mm mixed  TR2\par left: interpolar 10 x 9 x 8 mm solid hyperoeic TR 5, left interpolar 5 x 4 x 5, tr3 \par \par

## 2023-04-06 NOTE — HISTORY OF PRESENT ILLNESS
[FreeTextEntry1] : Patient stated not taking her glucose. Patient stated does not check her glucose. She said she was depressed after her sister  recently. Patient no show x 1 year. Noted her hgbaic increased to 8.6.\par Review with patient her labs and need to increase her Lantus. she is currently taking 14 unit, will need to increase to to 18 units.\par Patient stated has enough jardiance and lantus and glucose testing at home.

## 2023-04-06 NOTE — PHYSICAL EXAM
[Alert] : alert [Well Nourished] : well nourished [Obese] : obese [No Acute Distress] : no acute distress [Well Developed] : well developed [Normal Sclera/Conjunctiva] : normal sclera/conjunctiva [EOMI] : extra ocular movement intact [PERRL] : pupils equal, round and reactive to light [No Proptosis] : no proptosis [Normal Outer Ear/Nose] : the ears and nose were normal in appearance [Normal Hearing] : hearing was normal [Supple] : the neck was supple [Thyroid Not Enlarged] : the thyroid was not enlarged [No Respiratory Distress] : no respiratory distress [No Accessory Muscle Use] : no accessory muscle use [Clear to Auscultation] : lungs were clear to auscultation bilaterally [Normal S1, S2] : normal S1 and S2 [Normal Rate] : heart rate was normal [Regular Rhythm] : with a regular rhythm [Carotids Normal] : carotid pulses were normal with no bruits [No Edema] : no peripheral edema [Pedal Pulses Normal] : the pedal pulses are present [Normal Bowel Sounds] : normal bowel sounds [Not Tender] : non-tender [Not Distended] : not distended [Soft] : abdomen soft [Normal Anterior Cervical Nodes] : no anterior cervical lymphadenopathy [No CVA Tenderness] : no ~M costovertebral angle tenderness [No Spinal Tenderness] : no spinal tenderness [Spine Straight] : spine straight [No Stigmata of Cushings Syndrome] : no stigmata of Cushings Syndrome [Normal Gait] : normal gait [Normal Strength/Tone] : muscle strength and tone were normal [No Rash] : no rash [Right foot was examined, including] : right foot ~C was examined, including visual inspection with sensory and pulse exams [Left foot was examined, including] : left foot ~C was examined, including visual inspection with sensory and pulse exams [Normal] : normal [Full ROM] : with full range of motion [Cranial Nerves Intact] : cranial nerves 2-12 were intact [No Motor Deficits] : the motor exam was normal [Normal Reflexes] : deep tendon reflexes were 2+ and symmetric [No Tremors] : no tremors [Oriented x3] : oriented to person, place, and time [Normal Affect] : the affect was normal [Normal Mood] : the mood was normal [Kyphosis] : no kyphosis present [Scoliosis] : no scoliosis [Acanthosis Nigricans] : no acanthosis nigricans [Foot Ulcers] : no foot ulcers [Delayed in the Right Toes] : normal in the toes [Delayed in the Left Toes] : normal in the toes [Vibration Dec.] : normal vibratory sensation at the level of the toes [Position Sense Dec.] : normal position sense at the level of the toes [Diminished Throughout Both Feet] : normal tactile sensation with monofilament testing throughout both feet [#1 Diminished] : number 1 was normal [#2 Diminished] : number 2 was normal [#3 Diminished] : number 3 was normal [#4 Diminished] : number 4 was normal [#5 Diminished] : number 5 was normal [#6 Diminished] : number 6 was normal [#7 Diminished] : number 7 was normal [#8 Diminished] : number 8 was normal [#9 Diminished] : number 9 was normal [#10 Diminished] : number 10 was normal [de-identified] : 2/14/23 thyroid sonogram\par right: interpolar 5 x 4x 5 mm TR1, interpolar region anterior 10 x 6 x 8 mm mixed  TR2\par left: interpolar 10 x 9 x 8 mm solid hyperoeic TR 5, left interpolar 5 x 4 x 5, tr3  [de-identified] : DM2, thyroid nodule

## 2023-04-06 NOTE — ASSESSMENT
[Diabetes Foot Care] : diabetes foot care [Long Term Vascular Complications] : long term vascular complications of diabetes [Carbohydrate Consistent Diet] : carbohydrate consistent diet [Importance of Diet and Exercise] : importance of diet and exercise to improve glycemic control, achieve weight loss and improve cardiovascular health [Exercise/Effect on Glucose] : exercise/effect on glucose [Hypoglycemia Management] : hypoglycemia management [Glucagon Use] : glucagon use [Ketone Testing] : ketone testing [Action and use of Insulin] : action and use of short and long-acting insulin [Self Monitoring of Blood Glucose] : self monitoring of blood glucose [Insulin Self-Administration] : insulin self-administration [Injection Technique, Storage, Sharps Disposal] : injection technique, storage, and sharps disposal [Sick-Day Management] : sick-day management [Retinopathy Screening] : Patient was referred to ophthalmology for retinopathy screening [Weight Loss] : weight loss [Diabetic Medications] : Risks and benefits of diabetic medications were discussed [FreeTextEntry4] : [address]a, execise, lose weight

## 2023-04-06 NOTE — THERAPY
[Today's Date] : [unfilled] [Lantus] : Lantus [BG Target = ____] : BG Target = [unfilled] [FreeTextEntry9] : 14 units daily will need to increase to 18 units

## 2023-04-10 RX ORDER — BLOOD-GLUCOSE METER
W/DEVICE EACH MISCELLANEOUS
Qty: 1 | Refills: 2 | Status: ACTIVE | COMMUNITY
Start: 2020-07-20 | End: 1900-01-01

## 2023-04-21 ENCOUNTER — APPOINTMENT (OUTPATIENT)
Dept: ORTHOPEDIC SURGERY | Facility: CLINIC | Age: 58
End: 2023-04-21

## 2023-05-25 ENCOUNTER — APPOINTMENT (OUTPATIENT)
Dept: ENDOCRINOLOGY | Facility: CLINIC | Age: 58
End: 2023-05-25

## 2023-08-17 NOTE — ED ADULT NURSE NOTE - NS ED NURSE LEVEL OF CONSCIOUSNESS AFFECT
Calm
Sunscreen Recommendations: SPF 50 or above reapply every 1.5-2 hours
Detail Level: Zone
Sunscreen Recommendations: SPF 50 re-apply every 1.5-2 hours
Detail Level: Generalized

## 2023-11-08 NOTE — ED ADULT NURSE NOTE - IS THE PATIENT ABLE TO BE SCREENED?
Group Topic:  Substance Abuse Process Group    Date: 11/8/2023  Start Time: 1000  End Time: 1100  Facilitators: Emmie Valenzuela MSW; Domenic Arthur    Focus: Check In/ Process  Number in attendance: 13    Goals: Check-In group is an opportunity for Patient's to have guided reflection on treatment progression. Patient documents relevant environmental stressors and supports while receiving peer-professional feedback. Patient is also assessed on readiness for discharge and follow-up treatment.   Handouts: Check-in sheet that is handed into this writer  Method: Group and Handout  Attendance: Present  Mood/Affect: Appropriate  Behavior/Socialization: Appropriate to group  Participation: Active  Overall Patient Response to Group: Appropriate to topic  Individual Response to Group: Pt supportive of peers.  Patient/Resident Check-in Self report    Last self reported use: 10/14/23  Substance(s) of choice: Alcohol  and Marijuana   Number of hours of sleep: 8  Difficulty falling Asleep: No   Difficulty staying asleep:No   Current dreams about consuming substances or night terrors: No    Patient reported appetite as: good  Number of meals in the last 24 hours: 3    My Mood is: \"contemplative, patient, accepting, grateful\"    Depressive symptoms:(0=none, 10= worst) 5 \"relationship is on pause indefinitely\"    Anxiety symptoms: (0=none, 10= worst) 5 \"work financial consistency, transition\"    Cravings:(0=none, 10=worst) 1 \"keeping firm on my actions\"    Attended a recovery meeting last night and/or this morning: Yes  Number of recovery meetings attended since Sunday: 4    Feelings of hopelessness or helplessness: No   Thoughts of wising you were dead or would harm self: No   Thoughts of harming someone else: No     Recovery goal/task for the day: \"meditate for 10-20 minutes\"    What is one thing you are grateful for today: \"meditation meeting last night\"    What is a task(s) you completed during independent study yesterday:  \"the journal prompts for yesterday\"    Preferred Level of Care: [facility] , Individual Therapy  and Recovery meetings  Aftercare Concerns: Yes \"sex therapy\"    Ability to Apply Content to Group: 5  Therapist: CAMPOS Mccoy     Yes

## 2023-11-14 NOTE — PHYSICAL THERAPY INITIAL EVALUATION ADULT - LEVEL OF INDEPENDENCE: SIT/STAND, REHAB EVAL
How Severe Is This Condition?: mild
1st attempt: Mod assist 2nd attempt: Min assist. Pt required VCs to push up from the armrests. Pt was able to tolerate standing ~30 seconds during both the trials. Pt was unable to perform marching in standing due to decreased endurance and needed to sit back down.

## 2023-12-14 ENCOUNTER — APPOINTMENT (OUTPATIENT)
Dept: ENDOCRINOLOGY | Facility: CLINIC | Age: 58
End: 2023-12-14

## 2024-02-27 ENCOUNTER — APPOINTMENT (OUTPATIENT)
Dept: ENDOCRINOLOGY | Facility: CLINIC | Age: 59
End: 2024-02-27
Payer: COMMERCIAL

## 2024-02-27 VITALS
HEIGHT: 62 IN | WEIGHT: 158 LBS | TEMPERATURE: 97.6 F | RESPIRATION RATE: 18 BRPM | OXYGEN SATURATION: 100 % | HEART RATE: 66 BPM | DIASTOLIC BLOOD PRESSURE: 70 MMHG | SYSTOLIC BLOOD PRESSURE: 132 MMHG | BODY MASS INDEX: 29.08 KG/M2

## 2024-02-27 DIAGNOSIS — E11.65 TYPE 2 DIABETES MELLITUS WITH HYPERGLYCEMIA: ICD-10-CM

## 2024-02-27 DIAGNOSIS — Z79.4 TYPE 2 DIABETES MELLITUS WITH HYPERGLYCEMIA: ICD-10-CM

## 2024-02-27 DIAGNOSIS — I10 ESSENTIAL (PRIMARY) HYPERTENSION: ICD-10-CM

## 2024-02-27 DIAGNOSIS — R79.89 OTHER SPECIFIED ABNORMAL FINDINGS OF BLOOD CHEMISTRY: ICD-10-CM

## 2024-02-27 DIAGNOSIS — E04.2 NONTOXIC MULTINODULAR GOITER: ICD-10-CM

## 2024-02-27 DIAGNOSIS — E78.5 HYPERLIPIDEMIA, UNSPECIFIED: ICD-10-CM

## 2024-02-27 PROCEDURE — 83036 HEMOGLOBIN GLYCOSYLATED A1C: CPT | Mod: QW

## 2024-02-27 PROCEDURE — 99214 OFFICE O/P EST MOD 30 MIN: CPT

## 2024-02-27 PROCEDURE — 82962 GLUCOSE BLOOD TEST: CPT

## 2024-02-27 RX ORDER — TIRZEPATIDE 5 MG/.5ML
5 INJECTION, SOLUTION SUBCUTANEOUS
Qty: 12 | Refills: 2 | Status: ACTIVE | COMMUNITY
Start: 2024-02-27 | End: 1900-01-01

## 2024-02-27 RX ORDER — MELOXICAM 15 MG/1
15 TABLET ORAL
Qty: 30 | Refills: 0 | Status: COMPLETED | COMMUNITY
Start: 2023-04-20 | End: 2024-02-27

## 2024-02-27 RX ORDER — INSULIN GLARGINE 100 [IU]/ML
100 INJECTION, SOLUTION SUBCUTANEOUS
Refills: 0 | Status: COMPLETED | COMMUNITY
End: 2024-02-27

## 2024-02-27 RX ORDER — BLOOD SUGAR DIAGNOSTIC
STRIP MISCELLANEOUS 3 TIMES DAILY
Qty: 300 | Refills: 3 | Status: ACTIVE | COMMUNITY
Start: 2020-07-20 | End: 1900-01-01

## 2024-02-27 RX ORDER — MELOXICAM 15 MG/1
15 TABLET ORAL
Qty: 30 | Refills: 0 | Status: COMPLETED | COMMUNITY
Start: 2023-03-13 | End: 2024-02-27

## 2024-02-27 RX ORDER — TIRZEPATIDE 2.5 MG/.5ML
2.5 INJECTION, SOLUTION SUBCUTANEOUS
Qty: 4 | Refills: 0 | Status: COMPLETED | COMMUNITY
Start: 2023-04-06 | End: 2024-02-27

## 2024-02-27 RX ORDER — EMPAGLIFLOZIN 10 MG/1
10 TABLET, FILM COATED ORAL DAILY
Qty: 90 | Refills: 3 | Status: ACTIVE | COMMUNITY
Start: 2022-03-16 | End: 1900-01-01

## 2024-02-27 RX ORDER — LANCETS
EACH MISCELLANEOUS
Qty: 300 | Refills: 3 | Status: ACTIVE | COMMUNITY
Start: 2020-07-20 | End: 1900-01-01

## 2024-02-27 RX ORDER — INSULIN GLARGINE 100 [IU]/ML
100 INJECTION, SOLUTION SUBCUTANEOUS DAILY
Qty: 5 | Refills: 5 | Status: COMPLETED | COMMUNITY
Start: 2020-07-21 | End: 2024-02-27

## 2024-02-27 NOTE — ASSESSMENT
[Diabetes Foot Care] : diabetes foot care [Long Term Vascular Complications] : long term vascular complications of diabetes [Carbohydrate Consistent Diet] : carbohydrate consistent diet [Importance of Diet and Exercise] : importance of diet and exercise to improve glycemic control, achieve weight loss and improve cardiovascular health [Exercise/Effect on Glucose] : exercise/effect on glucose [Hypoglycemia Management] : hypoglycemia management [Glucagon Use] : glucagon use [Ketone Testing] : ketone testing [Action and use of Insulin] : action and use of short and long-acting insulin [Self Monitoring of Blood Glucose] : self monitoring of blood glucose [Insulin Self-Administration] : insulin self-administration [Injection Technique, Storage, Sharps Disposal] : injection technique, storage, and sharps disposal [Sick-Day Management] : sick-day management [Retinopathy Screening] : Patient was referred to ophthalmology for retinopathy screening [Diabetic Medications] : Risks and benefits of diabetic medications were discussed [Weight Loss] : weight loss [Other____] : [unfilled] [FreeTextEntry1] : DM2 diet and exercise Mounjaro 2.5mg increase to 5 mg weekly continue with Jardiance 10 daily d/c lantus 10 units please call if glucose elevated and if neede to restart insulin  thyroid nodule repeat thyroid sonogram. [FreeTextEntry4] : [address] diet, exercising

## 2024-02-27 NOTE — THERAPY
[Today's Date] : [unfilled] [Lantus] : Lantus [BG Target = ____] : BG Target = [unfilled] [FreeTextEntry9] : 10 units at night

## 2024-02-27 NOTE — ASSESSMENT
[facility]       Last ?Written Prescription Date:  ?  Last Fill Quantity: ?,   # refills: ?  Last Office Visit : 1/24/24  Future Office visit:  1/8/25    Routing refill request to provider for review/approval because:  Medication is reported/historical       [Diabetes Foot Care] : diabetes foot care [Long Term Vascular Complications] : long term vascular complications of diabetes [Carbohydrate Consistent Diet] : carbohydrate consistent diet [Importance of Diet and Exercise] : importance of diet and exercise to improve glycemic control, achieve weight loss and improve cardiovascular health [Exercise/Effect on Glucose] : exercise/effect on glucose [Hypoglycemia Management] : hypoglycemia management [Ketone Testing] : ketone testing [Action and use of Insulin] : action and use of short and long-acting insulin [Self Monitoring of Blood Glucose] : self monitoring of blood glucose [Insulin Self-Administration] : insulin self-administration [Injection Technique, Storage, Sharps Disposal] : injection technique, storage, and sharps disposal [Sick-Day Management] : sick-day management [Retinopathy Screening] : Patient was referred to ophthalmology for retinopathy screening [Diabetic Medications] : Risks and benefits of diabetic medications were discussed [FreeTextEntry4] : [address]a diet , exercise

## 2024-02-27 NOTE — HISTORY OF PRESENT ILLNESS
[Continuous Glucose Monitoring] : Continuous Glucose Monitoring: No [Hypoglycemia] : Patient is not hypoglycemic. [Finger Stick] : Finger Stick: Yes [FreeTextEntry9] : 120-130 [FreeTextEntry1] : testing 1-2 x a day

## 2024-02-27 NOTE — PHYSICAL EXAM
[Alert] : alert [Well Nourished] : well nourished [Obese] : obese [No Acute Distress] : no acute distress [Well Developed] : well developed [Normal Sclera/Conjunctiva] : normal sclera/conjunctiva [EOMI] : extra ocular movement intact [PERRL] : pupils equal, round and reactive to light [No Proptosis] : no proptosis [Normal Outer Ear/Nose] : the ears and nose were normal in appearance [Normal Hearing] : hearing was normal [Supple] : the neck was supple [Thyroid Not Enlarged] : the thyroid was not enlarged [No Respiratory Distress] : no respiratory distress [No Accessory Muscle Use] : no accessory muscle use [Normal Rate and Effort] : normal respiratory rate and effort [Clear to Auscultation] : lungs were clear to auscultation bilaterally [Normal S1, S2] : normal S1 and S2 [Normal Rate] : heart rate was normal [Regular Rhythm] : with a regular rhythm [Carotids Normal] : carotid pulses were normal with no bruits [No Edema] : no peripheral edema [Pedal Pulses Normal] : the pedal pulses are present [Normal Bowel Sounds] : normal bowel sounds [Not Tender] : non-tender [Not Distended] : not distended [Soft] : abdomen soft [Normal Anterior Cervical Nodes] : no anterior cervical lymphadenopathy [No CVA Tenderness] : no ~M costovertebral angle tenderness [No Spinal Tenderness] : no spinal tenderness [Spine Straight] : spine straight [Kyphosis] : no kyphosis present [Scoliosis] : no scoliosis [No Stigmata of Cushings Syndrome] : no stigmata of Cushings Syndrome [Normal Gait] : normal gait [Normal Strength/Tone] : muscle strength and tone were normal [No Rash] : no rash [Acanthosis Nigricans] : no acanthosis nigricans [Foot Ulcers] : no foot ulcers [Right foot was examined, including] : right foot ~C was examined, including visual inspection with sensory and pulse exams [Left foot was examined, including] : left foot ~C was examined, including visual inspection with sensory and pulse exams [Delayed in the Right Toes] : normal in the toes [Normal] : normal [Full ROM] : with full range of motion [Delayed in the Left Toes] : normal in the toes [Vibration Dec.] : normal vibratory sensation at the level of the toes [Position Sense Dec.] : normal position sense at the level of the toes [Diminished Throughout Both Feet] : normal tactile sensation with monofilament testing throughout both feet [#1 Diminished] : number 1 was normal [#2 Diminished] : number 2 was normal [#3 Diminished] : number 3 was normal [#4 Diminished] : number 4 was normal [#5 Diminished] : number 5 was normal [#6 Diminished] : number 6 was normal [#7 Diminished] : number 7 was normal [#8 Diminished] : number 8 was normal [#9 Diminished] : number 9 was normal [#10 Diminished] : number 10 was normal [Cranial Nerves Intact] : cranial nerves 2-12 were intact [No Motor Deficits] : the motor exam was normal [Normal Reflexes] : deep tendon reflexes were 2+ and symmetric [No Tremors] : no tremors [Oriented x3] : oriented to person, place, and time [Normal Affect] : the affect was normal [Normal Mood] : the mood was normal [de-identified] : 2/14/23 thyroid sonogram, refer right: interpolar 5 x 4x 5 mm TR1, interpolar region anterior 10 x 6 x 8 mm mixed  TR2 left: interpolar 10 x 9 x 8 mm solid hyperoeic TR 5, left interpolar 5 x 4 x 5, tr3  [de-identified] : DM2, thyroid nodule

## 2024-02-29 LAB
GLUCOSE BLDC GLUCOMTR-MCNC: 158
HBA1C MFR BLD HPLC: 6.5

## 2024-03-09 RX ORDER — PEN NEEDLE, DIABETIC 29 G X1/2"
31G X 5 MM NEEDLE, DISPOSABLE MISCELLANEOUS
Qty: 100 | Refills: 3 | Status: ACTIVE | COMMUNITY
Start: 2019-10-12 | End: 1900-01-01

## 2024-05-04 NOTE — PROVIDER CONTACT NOTE (OTHER) - BACKGROUND
Problem: Adult Inpatient Plan of Care  Goal: Absence of Hospital-Acquired Illness or Injury  Intervention: Prevent Skin Injury  Recent Flowsheet Documentation  Taken 5/4/2024 0455 by Yancy Mejia RN  Body Position: position changed independently  Taken 5/4/2024 0213 by Yancy Mejia RN  Body Position: position changed independently  Taken 5/4/2024 0046 by Yancy Mejia RN  Body Position: position changed independently  Taken 5/3/2024 2250 by Yancy Mejia RN  Body Position: position changed independently  Taken 5/3/2024 2018 by Yancy Mejia RN  Body Position: position changed independently  Skin Protection:   adhesive use limited   transparent dressing maintained   Goal Outcome Evaluation:      Patient alert and oriented x 4, VSS on room air. Patient denies any complaints this shift. Dressing clean, dry and intact. Call light within reach.                                         post op day #6

## 2024-06-25 ENCOUNTER — APPOINTMENT (OUTPATIENT)
Dept: ENDOCRINOLOGY | Facility: CLINIC | Age: 59
End: 2024-06-25

## 2024-10-31 RX ORDER — TIRZEPATIDE 5 MG/.5ML
5 INJECTION, SOLUTION SUBCUTANEOUS WEEKLY
Qty: 3 | Refills: 0 | Status: ACTIVE | COMMUNITY
Start: 2024-10-31 | End: 1900-01-01

## 2025-02-03 ENCOUNTER — APPOINTMENT (OUTPATIENT)
Dept: ENDOCRINOLOGY | Facility: CLINIC | Age: 60
End: 2025-02-03
Payer: COMMERCIAL

## 2025-02-03 VITALS
HEIGHT: 62 IN | SYSTOLIC BLOOD PRESSURE: 130 MMHG | OXYGEN SATURATION: 98 % | HEART RATE: 74 BPM | RESPIRATION RATE: 18 BRPM | WEIGHT: 145 LBS | BODY MASS INDEX: 26.68 KG/M2 | DIASTOLIC BLOOD PRESSURE: 70 MMHG

## 2025-02-03 DIAGNOSIS — Z79.4 TYPE 2 DIABETES MELLITUS WITH HYPERGLYCEMIA: ICD-10-CM

## 2025-02-03 DIAGNOSIS — E04.2 NONTOXIC MULTINODULAR GOITER: ICD-10-CM

## 2025-02-03 DIAGNOSIS — I10 ESSENTIAL (PRIMARY) HYPERTENSION: ICD-10-CM

## 2025-02-03 DIAGNOSIS — R79.89 OTHER SPECIFIED ABNORMAL FINDINGS OF BLOOD CHEMISTRY: ICD-10-CM

## 2025-02-03 DIAGNOSIS — E78.5 HYPERLIPIDEMIA, UNSPECIFIED: ICD-10-CM

## 2025-02-03 DIAGNOSIS — E11.65 TYPE 2 DIABETES MELLITUS WITH HYPERGLYCEMIA: ICD-10-CM

## 2025-02-03 LAB
GLUCOSE BLDC GLUCOMTR-MCNC: 126
HBA1C MFR BLD HPLC: 6

## 2025-02-03 PROCEDURE — 99214 OFFICE O/P EST MOD 30 MIN: CPT

## 2025-02-03 PROCEDURE — 83036 HEMOGLOBIN GLYCOSYLATED A1C: CPT | Mod: QW

## 2025-02-03 PROCEDURE — 82962 GLUCOSE BLOOD TEST: CPT
